# Patient Record
Sex: FEMALE | Race: BLACK OR AFRICAN AMERICAN | NOT HISPANIC OR LATINO | Employment: FULL TIME | URBAN - METROPOLITAN AREA
[De-identification: names, ages, dates, MRNs, and addresses within clinical notes are randomized per-mention and may not be internally consistent; named-entity substitution may affect disease eponyms.]

---

## 2023-06-13 LAB
CHLAMYDIA TRACH., AMPLIFIED: NEGATIVE
N. GONORRHEA, AMPLIFIED: NEGATIVE
TRICHOMONAS VAGINALIS: NEGATIVE

## 2023-06-14 LAB — URINE CULTURE: NORMAL

## 2023-06-21 LAB
ABO GROUP (TYPE) IN BLOOD: NORMAL
ANTIBODY SCREEN: NORMAL
GLUCOSE, 1 HR SCREEN, PREG: 139 MG/DL
RH FACTOR: NORMAL

## 2023-06-22 LAB
CALCIDIOL (25 OH VITAMIN D3) (NG/ML) IN SER/PLAS: 11 NG/ML
ERYTHROCYTE DISTRIBUTION WIDTH (RATIO) BY AUTOMATED COUNT: 13.1 % (ref 11.5–14.5)
ERYTHROCYTE MEAN CORPUSCULAR HEMOGLOBIN CONCENTRATION (G/DL) BY AUTOMATED: 33.1 G/DL (ref 32–36)
ERYTHROCYTE MEAN CORPUSCULAR VOLUME (FL) BY AUTOMATED COUNT: 94 FL (ref 80–100)
ERYTHROCYTES (10*6/UL) IN BLOOD BY AUTOMATED COUNT: 3.72 X10E12/L (ref 4–5.2)
HEMATOCRIT (%) IN BLOOD BY AUTOMATED COUNT: 35 % (ref 36–46)
HEMOGLOBIN (G/DL) IN BLOOD: 11.6 G/DL (ref 12–16)
HEPATITIS B VIRUS SURFACE AG PRESENCE IN SERUM: NONREACTIVE
HEPATITIS C VIRUS AB PRESENCE IN SERUM: NONREACTIVE
HIV 1/ 2 AG/AB SCREEN: NONREACTIVE
LEUKOCYTES (10*3/UL) IN BLOOD BY AUTOMATED COUNT: 4.5 X10E9/L (ref 4.4–11.3)
NRBC (PER 100 WBCS) BY AUTOMATED COUNT: 0 /100 WBC (ref 0–0)
PLATELETS (10*3/UL) IN BLOOD AUTOMATED COUNT: 318 X10E9/L (ref 150–450)
REFLEX ADDED, ANEMIA PANEL: ABNORMAL
RUBELLA VIRUS IGG AB: NEGATIVE
SYPHILIS TOTAL AB: NONREACTIVE

## 2023-07-22 LAB
GLUCOSE THREE HOUR: 110 MG/DL
GLUCOSE TWO HOUR: 108 MG/DL
GLUCOSE, FASTING: 93 MG/DL
GLUCOSE, ONE HOUR: 144 MG/DL
GTTCM: NORMAL

## 2023-08-30 PROBLEM — J06.9 UPPER RESPIRATORY INFECTION: Status: ACTIVE | Noted: 2023-08-30

## 2023-08-30 PROBLEM — O24.419 GESTATIONAL DIABETES (HHS-HCC): Status: ACTIVE | Noted: 2023-08-30

## 2023-08-30 PROBLEM — R45.4 IRRITABILITY AND ANGER: Status: ACTIVE | Noted: 2023-08-30

## 2023-08-30 PROBLEM — F51.5 NIGHTMARES: Status: ACTIVE | Noted: 2023-08-30

## 2023-08-30 PROBLEM — M54.30 SCIATIC PAIN: Status: ACTIVE | Noted: 2023-08-30

## 2023-08-30 PROBLEM — K52.9 CHRONIC DIARRHEA: Status: ACTIVE | Noted: 2023-08-30

## 2023-08-30 PROBLEM — F41.1 ANXIETY, GENERALIZED: Status: ACTIVE | Noted: 2023-08-30

## 2023-08-30 PROBLEM — O09.891 HISTORY OF PRETERM DELIVERY, CURRENTLY PREGNANT IN FIRST TRIMESTER (HHS-HCC): Status: ACTIVE | Noted: 2023-08-30

## 2023-08-30 PROBLEM — F42.8 OBSESSIVE THINKING: Status: ACTIVE | Noted: 2023-08-30

## 2023-08-30 PROBLEM — F41.9 ANXIETY IN PREGNANCY, ANTEPARTUM (HHS-HCC): Status: ACTIVE | Noted: 2023-08-30

## 2023-08-30 PROBLEM — O99.810 ABNORMAL GLUCOSE TOLERANCE IN PREGNANCY (HHS-HCC): Status: ACTIVE | Noted: 2023-08-30

## 2023-08-30 PROBLEM — K64.8 INTERNAL HEMORRHOIDS WITH COMPLICATION: Status: ACTIVE | Noted: 2023-08-30

## 2023-08-30 PROBLEM — S33.5XXA SPRAIN, LOW BACK: Status: ACTIVE | Noted: 2023-08-30

## 2023-08-30 PROBLEM — F43.10 PTSD (POST-TRAUMATIC STRESS DISORDER): Status: ACTIVE | Noted: 2023-08-30

## 2023-08-30 PROBLEM — M54.9 BACK PAIN: Status: ACTIVE | Noted: 2023-08-30

## 2023-08-30 PROBLEM — N63.0 BREAST LUMP OR MASS: Status: ACTIVE | Noted: 2023-08-30

## 2023-08-30 PROBLEM — O14.90 PRE-ECLAMPSIA (HHS-HCC): Status: ACTIVE | Noted: 2023-08-30

## 2023-08-30 PROBLEM — R10.2 PELVIC PAIN IN FEMALE: Status: ACTIVE | Noted: 2023-08-30

## 2023-08-30 PROBLEM — L60.3 NAIL BRITTLENESS: Status: ACTIVE | Noted: 2023-08-30

## 2023-08-30 PROBLEM — O99.340 ANXIETY IN PREGNANCY, ANTEPARTUM (HHS-HCC): Status: ACTIVE | Noted: 2023-08-30

## 2023-08-30 PROBLEM — N76.0 VAGINITIS: Status: ACTIVE | Noted: 2023-08-30

## 2023-08-30 PROBLEM — N88.2 CERVICAL STENOSIS (UTERINE CERVIX): Status: ACTIVE | Noted: 2023-08-30

## 2023-08-30 PROBLEM — F41.0 PANIC ATTACKS: Status: ACTIVE | Noted: 2023-08-30

## 2023-08-30 PROBLEM — O99.019 ANEMIA, ANTEPARTUM (HHS-HCC): Status: ACTIVE | Noted: 2023-08-30

## 2023-08-30 PROBLEM — K62.5 RECTAL BLEEDING: Status: ACTIVE | Noted: 2023-08-30

## 2023-08-30 PROBLEM — F32.A DEPRESSION: Status: ACTIVE | Noted: 2023-08-30

## 2023-08-30 PROBLEM — R19.7 FREQUENT LOOSE STOOLS: Status: ACTIVE | Noted: 2023-08-30

## 2023-08-30 PROBLEM — F32.5 MAJOR DEPRESSIVE DISORDER IN REMISSION (CMS-HCC): Status: ACTIVE | Noted: 2023-08-30

## 2023-08-30 PROBLEM — E55.9 VITAMIN D DEFICIENCY: Status: ACTIVE | Noted: 2023-08-30

## 2023-08-30 PROBLEM — R10.9 ABDOMINAL PAIN: Status: ACTIVE | Noted: 2023-08-30

## 2023-08-30 PROBLEM — T83.32XA IUD MIGRATION: Status: ACTIVE | Noted: 2023-08-30

## 2023-08-30 PROBLEM — G47.9 SLEEP DIFFICULTIES: Status: ACTIVE | Noted: 2023-08-30

## 2023-08-30 PROBLEM — O09.299 H/O GESTATIONAL DIABETES IN PRIOR PREGNANCY, CURRENTLY PREGNANT (HHS-HCC): Status: ACTIVE | Noted: 2023-08-30

## 2023-08-30 PROBLEM — I10 HYPERTENSION: Status: ACTIVE | Noted: 2023-08-30

## 2023-08-30 PROBLEM — K58.9 IRRITABLE BOWEL SYNDROME: Status: ACTIVE | Noted: 2023-08-30

## 2023-08-30 PROBLEM — Z86.32 H/O GESTATIONAL DIABETES IN PRIOR PREGNANCY, CURRENTLY PREGNANT (HHS-HCC): Status: ACTIVE | Noted: 2023-08-30

## 2023-08-30 PROBLEM — K58.2 MIXED IRRITABLE BOWEL SYNDROME: Status: ACTIVE | Noted: 2023-08-30

## 2023-08-30 RX ORDER — ASPIRIN 81 MG/1
81 TABLET ORAL DAILY
COMMUNITY
End: 2023-10-06

## 2023-08-30 RX ORDER — OXYCODONE AND ACETAMINOPHEN 5; 325 MG/1; MG/1
TABLET ORAL EVERY 6 HOURS PRN
COMMUNITY
Start: 2018-11-11 | End: 2023-10-06

## 2023-08-30 RX ORDER — PROPRANOLOL HYDROCHLORIDE 10 MG/1
TABLET ORAL
COMMUNITY
End: 2023-10-06

## 2023-08-30 RX ORDER — IBUPROFEN 600 MG/1
600 TABLET ORAL EVERY 6 HOURS PRN
COMMUNITY
Start: 2018-11-11 | End: 2023-10-06

## 2023-08-30 RX ORDER — DICYCLOMINE HYDROCHLORIDE 10 MG/1
10 CAPSULE ORAL EVERY 6 HOURS PRN
COMMUNITY
Start: 2019-08-19 | End: 2023-10-06

## 2023-08-30 RX ORDER — FAMOTIDINE 40 MG/1
40 TABLET, FILM COATED ORAL DAILY
COMMUNITY
Start: 2021-02-09 | End: 2023-10-06

## 2023-08-30 RX ORDER — FERROUS SULFATE 325(65) MG
65 TABLET ORAL DAILY
Status: ON HOLD | COMMUNITY
End: 2023-10-11 | Stop reason: ALTCHOICE

## 2023-08-30 RX ORDER — TERCONAZOLE 8 MG/G
1 CREAM VAGINAL NIGHTLY
COMMUNITY
Start: 2022-07-06 | End: 2023-10-06

## 2023-08-30 RX ORDER — ACETAMINOPHEN 325 MG/1
TABLET ORAL
COMMUNITY
Start: 2018-10-29 | End: 2023-12-29 | Stop reason: HOSPADM

## 2023-08-30 RX ORDER — AMITRIPTYLINE HYDROCHLORIDE 50 MG/1
1 TABLET, FILM COATED ORAL NIGHTLY
COMMUNITY
Start: 2020-01-20 | End: 2023-10-06

## 2023-08-30 RX ORDER — CYCLOBENZAPRINE HCL 10 MG
10 TABLET ORAL 3 TIMES DAILY PRN
COMMUNITY
End: 2023-10-06

## 2023-08-30 RX ORDER — NITROFURANTOIN 25; 75 MG/1; MG/1
100 CAPSULE ORAL EVERY 12 HOURS
COMMUNITY
Start: 2022-05-02 | End: 2023-10-06

## 2023-08-30 RX ORDER — ONDANSETRON 4 MG/1
TABLET, FILM COATED ORAL
COMMUNITY
Start: 2020-12-28 | End: 2023-10-06

## 2023-08-30 RX ORDER — BUSPIRONE HYDROCHLORIDE 7.5 MG/1
7.5 TABLET ORAL 2 TIMES DAILY
COMMUNITY
Start: 2022-03-16 | End: 2023-10-06

## 2023-08-30 RX ORDER — PROGESTERONE 200 MG/1
200 CAPSULE ORAL NIGHTLY
COMMUNITY
End: 2023-10-06

## 2023-08-30 RX ORDER — ONDANSETRON 4 MG/1
4 TABLET, ORALLY DISINTEGRATING ORAL EVERY 8 HOURS PRN
COMMUNITY
Start: 2022-11-11 | End: 2023-10-06

## 2023-08-30 RX ORDER — HYDROXYZINE HYDROCHLORIDE 25 MG/1
TABLET, FILM COATED ORAL
COMMUNITY
Start: 2020-03-02 | End: 2023-10-06

## 2023-08-30 RX ORDER — DICYCLOMINE HYDROCHLORIDE 10 MG/1
CAPSULE ORAL
COMMUNITY
Start: 2021-03-03 | End: 2023-10-06

## 2023-10-01 VITALS — BODY MASS INDEX: 24.37 KG/M2 | SYSTOLIC BLOOD PRESSURE: 118 MMHG | DIASTOLIC BLOOD PRESSURE: 71 MMHG | WEIGHT: 129 LBS

## 2023-10-06 ENCOUNTER — ROUTINE PRENATAL (OUTPATIENT)
Dept: OBSTETRICS AND GYNECOLOGY | Facility: CLINIC | Age: 33
End: 2023-10-06
Payer: COMMERCIAL

## 2023-10-06 VITALS — BODY MASS INDEX: 30.61 KG/M2 | SYSTOLIC BLOOD PRESSURE: 130 MMHG | DIASTOLIC BLOOD PRESSURE: 77 MMHG | WEIGHT: 162 LBS

## 2023-10-06 DIAGNOSIS — Z3A.26 26 WEEKS GESTATION OF PREGNANCY (HHS-HCC): Primary | ICD-10-CM

## 2023-10-06 PROCEDURE — 99213 OFFICE O/P EST LOW 20 MIN: CPT | Performed by: OBSTETRICS & GYNECOLOGY

## 2023-10-06 NOTE — PROGRESS NOTES
"Subjective   Patient ID [id number]   Alba Mendez is a 33 y.o.  at 26 wk 0d with a working estimated date of delivery of 2024, by Ultrasound who presents for a routine prenatal visit. She denies vaginal bleeding, leakage of fluid, decreased fetal movements, or contractions.    Her pregnancy is complicated by:  History of  delivery in .  History of 16-week .  History of primary low-transverse  section in 2018 for a girl weighing 7 pounds 10 ounces.  She desires TOLAC, and a tubal ligation.  Recent through Glucola was normal 93/144/108/110.  Vitamin D equals 11, on prescription vitamin D.  CBC 11.6, started oral iron    Objective   Physical Exam FH=27 cm. QKY=109  Weight: 73.5 kg (162 lb)  Expected Total Weight Gain: Could not be calculated   Pregravid BMI: Could not be calculated  BP: 130/77         Prenatal Labs  Urine dip:  Lab Results   Component Value Date    KETONESU 20 (1+) (A) 2023       Lab Results   Component Value Date    HGB 11.6 (L) 2023    HCT 35.0 (L) 2023    HEPBSAG NONREACTIVE 2023     No results found for: \"PAPPA\", \"AFP\", \"HCG\", \"ESTRIOL\", \"INHBA\"  No results found for: \"GLUF\", \"GLUT1\", \"ZLFGJBD3PU\", \"DBSQFLI3HG\"    Imaging  The most recent ultrasound was performed on   with a study GA of   and EFW of  .          Assessment/Plan       Continue prenatal vitamin.  Labs reviewed.  Rhogam n/a  GTT 93/144/108/110.  Take Vit D and iron.  Follow up in 4 weeks for a routine prenatal visit.  "

## 2023-10-11 ENCOUNTER — HOSPITAL ENCOUNTER (OUTPATIENT)
Facility: HOSPITAL | Age: 33
End: 2023-10-11
Attending: OBSTETRICS & GYNECOLOGY | Admitting: OBSTETRICS & GYNECOLOGY
Payer: COMMERCIAL

## 2023-10-11 ENCOUNTER — HOSPITAL ENCOUNTER (OUTPATIENT)
Facility: HOSPITAL | Age: 33
Discharge: HOME | End: 2023-10-11
Attending: OBSTETRICS & GYNECOLOGY | Admitting: OBSTETRICS & GYNECOLOGY
Payer: COMMERCIAL

## 2023-10-11 VITALS
RESPIRATION RATE: 18 BRPM | TEMPERATURE: 98.2 F | HEART RATE: 90 BPM | SYSTOLIC BLOOD PRESSURE: 116 MMHG | DIASTOLIC BLOOD PRESSURE: 74 MMHG | OXYGEN SATURATION: 98 %

## 2023-10-11 DIAGNOSIS — K21.9 GASTROESOPHAGEAL REFLUX DISEASE WITHOUT ESOPHAGITIS: Primary | ICD-10-CM

## 2023-10-11 LAB
ALBUMIN SERPL BCP-MCNC: 3.7 G/DL (ref 3.4–5)
ALP SERPL-CCNC: 56 U/L (ref 33–110)
ALT SERPL W P-5'-P-CCNC: 12 U/L (ref 7–45)
ANION GAP SERPL CALC-SCNC: 17 MMOL/L (ref 10–20)
AST SERPL W P-5'-P-CCNC: 14 U/L (ref 9–39)
BILIRUB SERPL-MCNC: 0.2 MG/DL (ref 0–1.2)
BNP SERPL-MCNC: 12 PG/ML (ref 0–99)
BUN SERPL-MCNC: 9 MG/DL (ref 6–23)
CALCIUM SERPL-MCNC: 9.2 MG/DL (ref 8.6–10.6)
CARDIAC TROPONIN I PNL SERPL HS: 7 NG/L (ref 0–34)
CHLORIDE SERPL-SCNC: 107 MMOL/L (ref 98–107)
CO2 SERPL-SCNC: 17 MMOL/L (ref 21–32)
CREAT SERPL-MCNC: 0.43 MG/DL (ref 0.5–1.05)
ERYTHROCYTE [DISTWIDTH] IN BLOOD BY AUTOMATED COUNT: 12.3 % (ref 11.5–14.5)
GFR SERPL CREATININE-BSD FRML MDRD: >90 ML/MIN/1.73M*2
GLUCOSE SERPL-MCNC: 129 MG/DL (ref 74–99)
HCT VFR BLD AUTO: 32.9 % (ref 36–46)
HGB BLD-MCNC: 10.8 G/DL (ref 12–16)
MCH RBC QN AUTO: 30.9 PG (ref 26–34)
MCHC RBC AUTO-ENTMCNC: 32.8 G/DL (ref 32–36)
MCV RBC AUTO: 94 FL (ref 80–100)
NRBC BLD-RTO: 0 /100 WBCS (ref 0–0)
PLATELET # BLD AUTO: 237 X10*3/UL (ref 150–450)
PMV BLD AUTO: 12 FL (ref 7.5–11.5)
POTASSIUM SERPL-SCNC: 4.6 MMOL/L (ref 3.5–5.3)
PROT SERPL-MCNC: 6.5 G/DL (ref 6.4–8.2)
RBC # BLD AUTO: 3.5 X10*6/UL (ref 4–5.2)
SODIUM SERPL-SCNC: 136 MMOL/L (ref 136–145)
WBC # BLD AUTO: 7.4 X10*3/UL (ref 4.4–11.3)

## 2023-10-11 PROCEDURE — 84484 ASSAY OF TROPONIN QUANT: CPT

## 2023-10-11 PROCEDURE — 2500000005 HC RX 250 GENERAL PHARMACY W/O HCPCS

## 2023-10-11 PROCEDURE — 83880 ASSAY OF NATRIURETIC PEPTIDE: CPT

## 2023-10-11 PROCEDURE — 99214 OFFICE O/P EST MOD 30 MIN: CPT | Performed by: STUDENT IN AN ORGANIZED HEALTH CARE EDUCATION/TRAINING PROGRAM

## 2023-10-11 PROCEDURE — 99214 OFFICE O/P EST MOD 30 MIN: CPT | Mod: 25

## 2023-10-11 PROCEDURE — 84075 ASSAY ALKALINE PHOSPHATASE: CPT

## 2023-10-11 PROCEDURE — 36415 COLL VENOUS BLD VENIPUNCTURE: CPT | Mod: 59

## 2023-10-11 PROCEDURE — 71045 X-RAY EXAM CHEST 1 VIEW: CPT | Mod: FOREIGN READ | Performed by: RADIOLOGY

## 2023-10-11 PROCEDURE — 36415 COLL VENOUS BLD VENIPUNCTURE: CPT

## 2023-10-11 PROCEDURE — 85027 COMPLETE CBC AUTOMATED: CPT

## 2023-10-11 RX ORDER — LABETALOL HYDROCHLORIDE 5 MG/ML
20 INJECTION, SOLUTION INTRAVENOUS ONCE AS NEEDED
Status: DISCONTINUED | OUTPATIENT
Start: 2023-10-11 | End: 2023-10-11 | Stop reason: HOSPADM

## 2023-10-11 RX ORDER — FAMOTIDINE 20 MG/1
40 TABLET, FILM COATED ORAL 2 TIMES DAILY PRN
Qty: 30 TABLET | Refills: 2 | Status: SHIPPED | OUTPATIENT
Start: 2023-10-11 | End: 2023-11-22 | Stop reason: WASHOUT

## 2023-10-11 RX ORDER — NIFEDIPINE 10 MG/1
10 CAPSULE ORAL ONCE AS NEEDED
Status: DISCONTINUED | OUTPATIENT
Start: 2023-10-11 | End: 2023-10-11 | Stop reason: HOSPADM

## 2023-10-11 RX ORDER — ONDANSETRON HYDROCHLORIDE 2 MG/ML
4 INJECTION, SOLUTION INTRAVENOUS EVERY 6 HOURS PRN
Status: DISCONTINUED | OUTPATIENT
Start: 2023-10-11 | End: 2023-10-11 | Stop reason: HOSPADM

## 2023-10-11 RX ORDER — ONDANSETRON 4 MG/1
4 TABLET, FILM COATED ORAL EVERY 6 HOURS PRN
Status: DISCONTINUED | OUTPATIENT
Start: 2023-10-11 | End: 2023-10-11 | Stop reason: HOSPADM

## 2023-10-11 RX ORDER — ERGOCALCIFEROL 1.25 MG/1
1.25 CAPSULE ORAL
COMMUNITY

## 2023-10-11 RX ORDER — HYDRALAZINE HYDROCHLORIDE 20 MG/ML
5 INJECTION INTRAMUSCULAR; INTRAVENOUS ONCE AS NEEDED
Status: DISCONTINUED | OUTPATIENT
Start: 2023-10-11 | End: 2023-10-11 | Stop reason: HOSPADM

## 2023-10-11 RX ORDER — LIDOCAINE HYDROCHLORIDE 10 MG/ML
0.5 INJECTION INFILTRATION; PERINEURAL ONCE AS NEEDED
Status: DISCONTINUED | OUTPATIENT
Start: 2023-10-11 | End: 2023-10-11 | Stop reason: HOSPADM

## 2023-10-11 RX ADMIN — DIPHENHYDRAMINE HYDROCHLORIDE AND LIDOCAINE HYDROCHLORIDE AND ALUMINUM HYDROXIDE AND MAGNESIUM HYDRO 10 ML: KIT at 04:32

## 2023-10-11 ASSESSMENT — PAIN SCALES - GENERAL: PAINLEVEL: 5 - MODERATE PAIN

## 2023-10-11 NOTE — H&P
Obstetrical Admission History and Physical - TRIAGE     Subjective    Ms. Mendez is a 34yo  at 25.3wga presenting to OB triage for chest pain and SOB. Patient states that she was laying in bed about 4h ago and began experiencing sudden-onset shortness of breath with palpitations. She attempted changing positions while laying down with no improvement. She states palpitations have since resolved but continues to endorse centrally-located chest discomfort and SOB. She denies GERD or unusually strenuous activity. She denies a cardiac hx. Does report a remote hx of asthma, has not needed albuterol for years now, and states sx do not feel asthma-related. She denies dizziness, HA, syncope, or blurry vision. She does not report obstetric complaints including VB, LOF, or Ctx. She reports good FM.     Pregnancy notables:   - hx of PTD @ 16wks  and 25wks in . Was previously on vaginal progesterone and receiving CL measurements this pregnancy until 22wks.   - hx of preE in prior pregnancy   - hx of pLTCS for failed vacuum and arrest of descent          Obstetrical History   OB History    Para Term  AB Living   4 2 1 1 1 2   SAB IAB Ectopic Multiple Live Births   1       2      # Outcome Date GA Lbr Danyel/2nd Weight Sex Delivery Anes PTL Lv   4 Current            3 Term 18 39w5d  3459 g F CS-Unspec EPI  SANDI      Complications: Failure to Progress in Second Stage, Failure to progress in labor   2 SAB  16w0d   F    FD   1   25w0d  567 g F Vag-Spont  Y SANDI      Obstetric Comments   Blood Pressure Kit Device - Pt to take Blood pressure at least once a day 2023   Unspecified Medication - blood pressire cuff 2023   Aspirin 81 MG Oral Tablet Chewable - CHEW 1 TABLET BY MOUTH EVERY DAY 2023   Ferrous Sulfate 325 (65 Fe) MG Oral Tablet - TAKE 1 TABLET BY MOUTH EVERY DAY AS DIRECTED 2023   Prenatal Vitamins 28-0.8 MG Oral Tablet - TAKE 1 TABLET BY MOUTH EVERY DAY  2023       Past Medical History  Past Medical History:   Diagnosis Date    Personal history of gestational diabetes     History of gestational diabetes mellitus (GDM)    Personal history of other complications of pregnancy, childbirth and the puerperium     History of pre-eclampsia    Personal history of other diseases of the respiratory system     History of asthma        Past Surgical History   Past Surgical History:   Procedure Laterality Date    OTHER SURGICAL HISTORY  2019    Breast surgery    OTHER SURGICAL HISTORY  2019     section    OTHER SURGICAL HISTORY  2019    Oral surgery       Social History  Social History     Tobacco Use    Smoking status: Never    Smokeless tobacco: Never   Substance Use Topics    Alcohol use: Never     Substance and Sexual Activity   Drug Use Never       Allergies  Bee pollen, Ragweed, and Tree and shrub pollen     Medications  Medications Prior to Admission   Medication Sig Dispense Refill Last Dose    acetaminophen (Tylenol) 325 mg tablet 3 tab(s) orally every 6 hours, As Needed headache   Past Month    ergocalciferol (Vitamin D-2) 1.25 MG (40876 UT) capsule Take 1 capsule (1,250 mcg) by mouth 1 (one) time per week.   Past Week    PNV no.95/ferrous fum/folic ac (PRENATAL ORAL) 1 tablet once daily.   10/10/2023       Objective    Last Vitals  Temp Pulse Resp BP MAP O2 Sat   36.8 °C (98.2 °F) 90 18 116/74   98 %     Physical Examination  Genitourinary:      Vulva normal.   Cardiovascular:      Rate and Rhythm: Normal rate.   Pulmonary:      Effort: Pulmonary effort is normal. CTAB A/P     Breath sounds: Normal breath sounds.   Abdominal:      Palpations: Abdomen is soft.      Comments: Gravid, non-tender to palpation  Neurological:      General: No focal deficit present.      Mental Status: She is alert.   Skin:     General: Skin is warm and dry.   Psychiatric:         Mood and Affect: Mood normal.         Behavior: Behavior normal.      NST  Baseline: 140  Variability: moderate  Accels: +  Decels: -    SVE: deferred    Lab Review  Lab Results   Component Value Date    WBC 7.4 10/11/2023    HGB 10.8 (L) 10/11/2023    HCT 32.9 (L) 10/11/2023     10/11/2023     Lab Results   Component Value Date    BNP 12 10/11/2023     Trop and EKG neg   Assessment and Plan    Chest Pain, SOB  - EKG- NSR, possible left atrial enlargement   - CBC, CMP, Troponin, CNP, CXR wnl   - BMX to r/o GERD     2. IUP at 25.3wga  - CEFM Cat I currently   - Denies VB, LOF, or Ctx. Good FM  - continue routine pnc     Jyoti Hughes MD

## 2023-10-20 ENCOUNTER — TELEPHONE (OUTPATIENT)
Dept: OBSTETRICS AND GYNECOLOGY | Facility: CLINIC | Age: 33
End: 2023-10-20
Payer: COMMERCIAL

## 2023-10-20 NOTE — TELEPHONE ENCOUNTER
Pt called and is currently having a lot of mucus discharge she is 28 weeks pregnant. The discharge is white, no cramping. She has been having the discharge since this morning. Please advise thank you

## 2023-10-20 NOTE — TELEPHONE ENCOUNTER
"I talked with the patient.  She has no bleeding, no pain.  No dysuria, no change in bowel habits.  There is no itchy discharge or odor.  Reassurance was given.  In third-trimester pregnancy could have a mucousy white \"leukorrhea of pregnancy\".  She will call me if her symptoms change.  "

## 2023-10-25 ENCOUNTER — TELEPHONE (OUTPATIENT)
Dept: OBSTETRICS AND GYNECOLOGY | Facility: CLINIC | Age: 33
End: 2023-10-25
Payer: COMMERCIAL

## 2023-10-25 DIAGNOSIS — O99.810 ABNORMAL GLUCOSE TOLERANCE IN PREGNANCY (HHS-HCC): Primary | ICD-10-CM

## 2023-10-25 NOTE — TELEPHONE ENCOUNTER
She had a normal 3-hour glucose test in July 2023.  I did place another order,  plan to have it repeated at about 30 weeks.

## 2023-10-25 NOTE — TELEPHONE ENCOUNTER
Pt needs another order for 3 hour glucose testing before week 30 has appt next week with you she is in week 29 now. Please advise thank you

## 2023-11-03 ENCOUNTER — ROUTINE PRENATAL (OUTPATIENT)
Dept: OBSTETRICS AND GYNECOLOGY | Facility: CLINIC | Age: 33
End: 2023-11-03
Payer: COMMERCIAL

## 2023-11-03 VITALS — DIASTOLIC BLOOD PRESSURE: 72 MMHG | SYSTOLIC BLOOD PRESSURE: 128 MMHG | BODY MASS INDEX: 31.74 KG/M2 | WEIGHT: 168 LBS

## 2023-11-03 DIAGNOSIS — Z3A.30 30 WEEKS GESTATION OF PREGNANCY (HHS-HCC): Primary | ICD-10-CM

## 2023-11-03 DIAGNOSIS — R73.09 GLUCOSE TOLERANCE TEST ABNORMAL: ICD-10-CM

## 2023-11-03 DIAGNOSIS — Z34.83 PRENATAL CARE, SUBSEQUENT PREGNANCY, THIRD TRIMESTER (HHS-HCC): ICD-10-CM

## 2023-11-03 PROCEDURE — 0501F PRENATAL FLOW SHEET: CPT | Performed by: OBSTETRICS & GYNECOLOGY

## 2023-11-03 RX ORDER — PNV NO.95/FERROUS FUM/FOLIC AC 28MG-0.8MG
TABLET ORAL
COMMUNITY
Start: 2023-10-15

## 2023-11-03 NOTE — PROGRESS NOTES
"Subjective   Patient ID [id number]   Alba Mendez is a 33 y.o.  at 30w0d with a working estimated date of delivery of 2024, by Ultrasound who presents for a routine prenatal visit. She denies vaginal bleeding, leakage of fluid, decreased fetal movements, or contractions.    Her pregnancy is complicated by:  Previous , wants JESSIE AC with BTL if possible.  Is considering repeat .  State form signed 2023.,  Rubella nonimmune will need to vaccinate postpartum    Objective   Physical Exam: FH=31, EEB=038. VE closed/posterior/-4  Weight: 76.2 kg (168 lb)  Expected Total Weight Gain: Could not be calculated   Pregravid BMI: Could not be calculated  BP: 128/72                  Prenatal Labs  Urine Dip:  Lab Results   Component Value Date    KETONESU 20 (1+) (A) 2023     Lab Results   Component Value Date    HGB 10.8 (L) 10/11/2023    HCT 32.9 (L) 10/11/2023    ABO A 2023    HEPBSAG NONREACTIVE 2023     No results found for: \"PAPPA\", \"AFP\", \"HCG\", \"ESTRIOL\", \"INHBA\"  No results found for: \"GLUF\", \"GLUT1\", \"NQSEJBJ9HZ\", \"XUNDCRW3OR\"    Imaging  The most recent ultrasound was performed on 23  with a study GA of 22.6  and EFW of 64% .          Assessment/Plan     Continue prenatal vitamin.  Labs reviewed.  Needs 3-hour Glucola which she has planned for tomorrow.  GBS later.  Expected mode of delivery TOLAC vs repeat c/s with BS.  Follow up in 2 week for a routine prenatal visit.  "

## 2023-11-04 ENCOUNTER — LAB (OUTPATIENT)
Dept: LAB | Facility: LAB | Age: 33
End: 2023-11-04
Payer: COMMERCIAL

## 2023-11-04 DIAGNOSIS — O99.810 ABNORMAL GLUCOSE TOLERANCE IN PREGNANCY (HHS-HCC): ICD-10-CM

## 2023-11-04 DIAGNOSIS — O24.419 GESTATIONAL DIABETES MELLITUS (GDM) IN THIRD TRIMESTER, GESTATIONAL DIABETES METHOD OF CONTROL UNSPECIFIED (HHS-HCC): Primary | ICD-10-CM

## 2023-11-04 LAB
GLUCOSE 1H P 100 G GLC PO SERPL-MCNC: 205 MG/DL
GLUCOSE 2H P 100 G GLC PO SERPL-MCNC: 191 MG/DL
GLUCOSE 3H P 100 G GLC PO SERPL-MCNC: 118 MG/DL
GLUCOSE P FAST SERPL-MCNC: 109 MG/DL

## 2023-11-04 PROCEDURE — 82952 GTT-ADDED SAMPLES: CPT

## 2023-11-04 PROCEDURE — 82950 GLUCOSE TEST: CPT

## 2023-11-04 PROCEDURE — 36415 COLL VENOUS BLD VENIPUNCTURE: CPT

## 2023-11-04 NOTE — RESULT ENCOUNTER NOTE
The 3-hour Glucola does confirm gestational diabetes.  You have 3 abnormal values out of 4.  I did leave a message at your pharmacy to fill the prescription for a glucometer with test strips and lancets.  The plan is that you would check your blood sugars fasting, i.e. first thing in the morning.  Then you will check at 1 hour after breakfast, lunch and dinner.  Keep a log of those 4 values a day, you will need to follow-up with the high risk OB team for gestational diabetes monitoring.  I did put a referral in for that as well.

## 2023-11-06 DIAGNOSIS — O24.419 GESTATIONAL DIABETES MELLITUS (GDM) IN THIRD TRIMESTER, GESTATIONAL DIABETES METHOD OF CONTROL UNSPECIFIED (HHS-HCC): Primary | ICD-10-CM

## 2023-11-09 ENCOUNTER — NUTRITION (OUTPATIENT)
Dept: OBSTETRICS AND GYNECOLOGY | Facility: CLINIC | Age: 33
End: 2023-11-09

## 2023-11-17 ENCOUNTER — ROUTINE PRENATAL (OUTPATIENT)
Dept: OBSTETRICS AND GYNECOLOGY | Facility: CLINIC | Age: 33
End: 2023-11-17
Payer: COMMERCIAL

## 2023-11-17 ENCOUNTER — APPOINTMENT (OUTPATIENT)
Dept: OBSTETRICS AND GYNECOLOGY | Facility: CLINIC | Age: 33
End: 2023-11-17
Payer: COMMERCIAL

## 2023-11-17 VITALS — DIASTOLIC BLOOD PRESSURE: 73 MMHG | BODY MASS INDEX: 32.12 KG/M2 | WEIGHT: 170 LBS | SYSTOLIC BLOOD PRESSURE: 117 MMHG

## 2023-11-17 DIAGNOSIS — O24.410 DIET CONTROLLED GESTATIONAL DIABETES MELLITUS (GDM) IN THIRD TRIMESTER (HHS-HCC): ICD-10-CM

## 2023-11-17 DIAGNOSIS — Z3A.32 32 WEEKS GESTATION OF PREGNANCY (HHS-HCC): Primary | ICD-10-CM

## 2023-11-17 DIAGNOSIS — Z34.93 THIRD TRIMESTER PREGNANCY (HHS-HCC): ICD-10-CM

## 2023-11-17 PROCEDURE — 0501F PRENATAL FLOW SHEET: CPT | Performed by: OBSTETRICS & GYNECOLOGY

## 2023-11-17 RX ORDER — AZITHROMYCIN 250 MG/1
250 TABLET, FILM COATED ORAL DAILY
COMMUNITY
Start: 2023-11-13 | End: 2023-11-22 | Stop reason: WASHOUT

## 2023-11-17 NOTE — PROGRESS NOTES
"Subjective   Patient ID [id number]   Alba Mendez is a 33 y.o.  at 32w0d with a working estimated date of delivery of 2024, by Ultrasound who presents for a routine prenatal visit. She denies vaginal bleeding, leakage of fluid, decreased fetal movements, or contractions.    Her pregnancy is complicated by:  New diagnosis of gestational diabetes, had abnormal 3-hour last week.  She is contacted by [facility] and has follow-up .  She has an ultrasound also scheduled.  She has been logging her blood sugars and all fastings are elevated 10 2-1 23    Objective   Physical Exam: FH=34cm, QHA=266  Weight: 77.1 kg (170 lb)  Expected Total Weight Gain: Could not be calculated   Pregravid BMI: Could not be calculated  BP: 117/73                  Prenatal Labs  Urine Dip:  Lab Results   Component Value Date    KETONESU 20 (1+) (A) 2023     Lab Results   Component Value Date    HGB 10.8 (L) 10/11/2023    HCT 32.9 (L) 10/11/2023    ABO A 2023    HEPBSAG NONREACTIVE 2023     No results found for: \"PAPPA\", \"AFP\", \"HCG\", \"ESTRIOL\", \"INHBA\"  No results found for: \"GLUF\", \"GLUT1\", \"CRCNZPT8IM\", \"YVYZLGI9PX\"    Imaging  The most recent ultrasound was performed on The most recent ultrasound study is not finalized with a study GA of The most recent ultrasound study is not finalized and EFW of The most recent ultrasound study is not finalized.  The most recent ultrasound study is not finalized  The most recent ultrasound study is not finalized    Assessment/Plan     Continue prenatal vitamin.  Labs reviewed. GDM.  Has follow-up visit with [facility] and ultrasound next week.  GBS later.  Expected mode of delivery TOLAC, wants BTL.  Follow up in 2 week for a routine prenatal visit.  "

## 2023-11-22 ENCOUNTER — PHARMACY VISIT (OUTPATIENT)
Dept: PHARMACY | Facility: CLINIC | Age: 33
End: 2023-11-22
Payer: COMMERCIAL

## 2023-11-22 ENCOUNTER — ANCILLARY PROCEDURE (OUTPATIENT)
Dept: RADIOLOGY | Facility: CLINIC | Age: 33
End: 2023-11-22
Payer: COMMERCIAL

## 2023-11-22 ENCOUNTER — INITIAL PRENATAL (OUTPATIENT)
Dept: MATERNAL FETAL MEDICINE | Facility: CLINIC | Age: 33
End: 2023-11-22
Payer: COMMERCIAL

## 2023-11-22 VITALS — WEIGHT: 167 LBS | DIASTOLIC BLOOD PRESSURE: 73 MMHG | BODY MASS INDEX: 31.55 KG/M2 | SYSTOLIC BLOOD PRESSURE: 116 MMHG

## 2023-11-22 DIAGNOSIS — O24.419 GESTATIONAL DIABETES MELLITUS (GDM) IN THIRD TRIMESTER, GESTATIONAL DIABETES METHOD OF CONTROL UNSPECIFIED (HHS-HCC): ICD-10-CM

## 2023-11-22 PROCEDURE — 99215 OFFICE O/P EST HI 40 MIN: CPT | Mod: 25 | Performed by: STUDENT IN AN ORGANIZED HEALTH CARE EDUCATION/TRAINING PROGRAM

## 2023-11-22 PROCEDURE — 76819 FETAL BIOPHYS PROFIL W/O NST: CPT

## 2023-11-22 PROCEDURE — 99215 OFFICE O/P EST HI 40 MIN: CPT | Performed by: STUDENT IN AN ORGANIZED HEALTH CARE EDUCATION/TRAINING PROGRAM

## 2023-11-22 PROCEDURE — RXMED WILLOW AMBULATORY MEDICATION CHARGE

## 2023-11-22 PROCEDURE — 76816 OB US FOLLOW-UP PER FETUS: CPT | Performed by: STUDENT IN AN ORGANIZED HEALTH CARE EDUCATION/TRAINING PROGRAM

## 2023-11-22 PROCEDURE — 76819 FETAL BIOPHYS PROFIL W/O NST: CPT | Performed by: STUDENT IN AN ORGANIZED HEALTH CARE EDUCATION/TRAINING PROGRAM

## 2023-11-22 PROCEDURE — 76816 OB US FOLLOW-UP PER FETUS: CPT

## 2023-11-22 RX ORDER — PEN NEEDLE, DIABETIC 30 GX3/16"
NEEDLE, DISPOSABLE MISCELLANEOUS
Qty: 100 EACH | Refills: 3 | Status: SHIPPED | OUTPATIENT
Start: 2023-11-22 | End: 2023-12-29 | Stop reason: HOSPADM

## 2023-11-22 RX ORDER — ISOPROPYL ALCOHOL 70 ML/100ML
SWAB TOPICAL
Qty: 100 EACH | Refills: 3 | Status: SHIPPED | OUTPATIENT
Start: 2023-11-22 | End: 2023-12-29 | Stop reason: HOSPADM

## 2023-11-22 RX ORDER — INSULIN HUMAN 100 [IU]/ML
INJECTION, SUSPENSION SUBCUTANEOUS
Qty: 5 EACH | Refills: 11 | Status: SHIPPED | OUTPATIENT
Start: 2023-11-22 | End: 2023-11-22 | Stop reason: SDUPTHER

## 2023-11-22 RX ORDER — PEN NEEDLE, DIABETIC 30 GX3/16"
NEEDLE, DISPOSABLE MISCELLANEOUS
Qty: 100 EACH | Refills: 3 | Status: SHIPPED | OUTPATIENT
Start: 2023-11-22 | End: 2023-11-22 | Stop reason: SDUPTHER

## 2023-11-22 RX ORDER — HUMAN INSULIN 100 [IU]/ML
10 INJECTION, SUSPENSION SUBCUTANEOUS 2 TIMES DAILY
Qty: 15 ML | Refills: 11 | Status: SHIPPED | OUTPATIENT
Start: 2023-11-22 | End: 2023-12-29 | Stop reason: HOSPADM

## 2023-11-22 RX ORDER — ISOPROPYL ALCOHOL 70 ML/100ML
SWAB TOPICAL
Qty: 100 EACH | Refills: 3 | Status: SHIPPED | OUTPATIENT
Start: 2023-11-22 | End: 2023-11-22 | Stop reason: SDUPTHER

## 2023-11-22 NOTE — PROGRESS NOTES
Alba Mendez is a 32 yo  @ 32w5d who presents for a MFM consultation for newly diagnosed gestational diabetes. Pregnancy complicated by a history of  delivery x2 (25 weeks and 16 weeks), anxiety, prior  delivery, history of preeclampsia and asthma. Alba was seen by MFM at 12 weeks gestation. Please see prior note for additional details.    Alba had an elevated 1 hour glucola of 139 followed by an abnormal 3 hour. She has been checking her BG values 4x daily and reports the following values:    Fastin-129 (4/5 elevated)  Breakfast: 123-181 (3/5 elevated)  Lunch: 113-155 (1/5 elevated)   Dinner: 127-202 (3/5 elevated)    She is otherwise doing ok and denies leakage of fluid or vaginal bleeding. She reports good fetal movement.    Prior to today's visit she had an ultrasound that showed an estimated fetal weight of 2168 g (59%) with a normal amniotic fluid index of 14.9 cm    PMHX: asthma, anxiety  PSHX: PLTCD, breast cyst removal, wisdom teeth  OBXH:    1st: PLTCD for failed vacuum   2nd:  @ 25 weeks   3rd:  @ 16 weeks    4th: current  GYN: denies STIs  MEDS: PNV, vitamin D  ALL: NKDA  SOCIAL: denies tobacco/alcohol/illicit drug use  FAMILY: mother - DMII, HTN    O: /73   Wt 75.8 kg (167 lb)   LMP 2023   BMI 31.55 kg/m²   Gen: NAD  Resp: nonlabored breathing  Cardiac: good peripheral perfusion  Abd: gravid  Psych: appropriate mood and affect  FHTs: +FCA on U/S    A/P: Alba Mendez is a 32 yo  @ 32w5d who presents for a MFM consultation for newly diagnosed gestational diabetes.     Gestational Diabetes:    We discussed the pregnancy implications of the diagnosis including increased risk of pre-eclampsia, LGA/macrosomia, shoulder dystocia, stillbirth as well as  hypoglycemia, hyperbilirubinemia and respiratory distress. We reviewed the importance of glycemic control and its impact on lowering these risks. We discussed management ranging  from dietary changes to pharmacotherapy and that insulin is considered first line therapy rather than oral agents if medication is ultimately needed. We discussed our recommendation for serial growth ultrasounds and starting  testing at 32 weeks if treatment is required. We also stressed the potential persistence of impaired glucose tolerance post pregnancy in up to 30% of patients and 50% risk of IGT/T2DM in the next 10 years with an overall lifetime risk of T2DM of 70%. We reviewed the recommendation for postpartum screening at 6 weeks post-partum (can be performed as soon as 2 days after delivery) and, if normal, routine screening every 1-3 years. We did discuss that a healthy diet and maintenance of a normal body weight may reduce those risks    In summary the following is recommended:    1. We will continue to co-manage diabetes via the Bloodsugar line with weekly assessment of glycemic control.  Current regimen is: NPH 10 units in the morning and 10 units at night  2. We will plan for a follow up MD visit at 36 weeks to assess overall control and provide delivery timing recommendations.  3. Recommend serial growth ultrasounds every 4 weeks  4. Recommend staring weekly  testing now and twice weekly testing at 36 weeks   5. Delivery is recommended at 39 weeks, though if glycemic control is suboptimal then delivery at 37-39 weeks may be considered.  6. If the EFW is >4500g at the time of delivery  should be considered.  7. A 2hr GTT is recommended 6 weeks postpartum (can be performed as soon as 2 days postpartum), if normal then screening for T2DM is recommended at least every 3 years.    Thank you for the consultation. Please reach out to me with any questions or concerns.    Bennett Addison MD  Maternal-Fetal Medicine

## 2023-11-22 NOTE — PROGRESS NOTES
Insulin Pen Education     Able to demonstrate/describe:                   Wash hands                    Check label - verify correct medication                 Gentle mix (NPH)                 Attach new needle each time                 Safety test  (prime pen, ensure needle working properly)                  Select correct dose                 Self injection                 Site selection & rotation                 Remove needle                 Insulin Storage                 Needle disposal    Review Hypoglycemia                  Causes                 Sign & symptoms                 Oral Treatment    Patient appears to have good understanding and is engaged in self-care.  Encouraged to call for questions or concerns    Given printed education materials  Plans f/u by phone  2-3 days and then weekly for BGM support.    Given log sheets and contact information.

## 2023-12-01 ENCOUNTER — ROUTINE PRENATAL (OUTPATIENT)
Dept: OBSTETRICS AND GYNECOLOGY | Facility: CLINIC | Age: 33
End: 2023-12-01
Payer: COMMERCIAL

## 2023-12-01 ENCOUNTER — APPOINTMENT (OUTPATIENT)
Dept: OBSTETRICS AND GYNECOLOGY | Facility: CLINIC | Age: 33
End: 2023-12-01
Payer: COMMERCIAL

## 2023-12-01 VITALS — BODY MASS INDEX: 32.12 KG/M2 | SYSTOLIC BLOOD PRESSURE: 130 MMHG | WEIGHT: 170 LBS | DIASTOLIC BLOOD PRESSURE: 75 MMHG

## 2023-12-01 DIAGNOSIS — Z3A.34 34 WEEKS GESTATION OF PREGNANCY (HHS-HCC): Primary | ICD-10-CM

## 2023-12-01 DIAGNOSIS — O99.019 ANEMIA, ANTEPARTUM (HHS-HCC): ICD-10-CM

## 2023-12-01 DIAGNOSIS — O24.414 INSULIN CONTROLLED GESTATIONAL DIABETES MELLITUS (GDM) IN THIRD TRIMESTER (HHS-HCC): ICD-10-CM

## 2023-12-01 DIAGNOSIS — Z34.93 THIRD TRIMESTER PREGNANCY (HHS-HCC): ICD-10-CM

## 2023-12-01 PROCEDURE — 0501F PRENATAL FLOW SHEET: CPT | Performed by: OBSTETRICS & GYNECOLOGY

## 2023-12-01 NOTE — PROGRESS NOTES
"Subjective   Patient ID [id number]   Alba Mendez is a 33 y.o.  at 34w0d with a working estimated date of delivery of 2024, by Ultrasound who presents for a routine prenatal visit. She denies vaginal bleeding, leakage of fluid, decreased fetal movements, or contractions.    Her pregnancy is complicated by:  Recent ultrasound on 2023 notes breech presentation.  Now on 10 units of insulin at p.m., and a.m. for gestational diabetes.  Considering repeat .  Signed BTL form but not sure about tubal ligation anymore.    Objective   Physical Exam: FH=34.5 cm, WHW=398  Weight: 77.1 kg (170 lb)  Expected Total Weight Gain: Could not be calculated   Pregravid BMI: Could not be calculated  BP: 130/75                  Prenatal Labs  Urine Dip:  Lab Results   Component Value Date    KETONESU 20 (1+) (A) 2023     Lab Results   Component Value Date    HGB 10.8 (L) 10/11/2023    HCT 32.9 (L) 10/11/2023    ABO A 2023    HEPBSAG NONREACTIVE 2023     No results found for: \"PAPPA\", \"AFP\", \"HCG\", \"ESTRIOL\", \"INHBA\"  No results found for: \"GLUF\", \"GLUT1\", \"ZFVXDAZ3ZX\", \"NDIVIZH8TA\"    Imaging  The most recent ultrasound was performed on The most recent ultrasound study is not finalized with a study GA of The most recent ultrasound study is not finalized and EFW of The most recent ultrasound study is not finalized.  The most recent ultrasound study is not finalized  The most recent ultrasound study is not finalized    Assessment/Plan     Continue prenatal vitamin.  Labs reviewed.  GBS later.  Expected mode of delivery , considering repeat  #2 due to breech presentation, A2 diabetes on insulin.  Follow up in 1 week for a routine prenatal visit and NST.  "

## 2023-12-04 ENCOUNTER — PREP FOR PROCEDURE (OUTPATIENT)
Dept: OBSTETRICS AND GYNECOLOGY | Facility: CLINIC | Age: 33
End: 2023-12-04
Payer: COMMERCIAL

## 2023-12-04 DIAGNOSIS — O34.219 PREVIOUS CESAREAN SECTION COMPLICATING PREGNANCY (HHS-HCC): ICD-10-CM

## 2023-12-04 DIAGNOSIS — O24.414 INSULIN CONTROLLED GESTATIONAL DIABETES MELLITUS (GDM) IN THIRD TRIMESTER (HHS-HCC): Primary | ICD-10-CM

## 2023-12-05 ENCOUNTER — HOSPITAL ENCOUNTER (OUTPATIENT)
Facility: HOSPITAL | Age: 33
Setting detail: SURGERY ADMIT
End: 2023-12-05
Attending: OBSTETRICS & GYNECOLOGY | Admitting: OBSTETRICS & GYNECOLOGY
Payer: COMMERCIAL

## 2023-12-05 PROBLEM — O34.219 PREVIOUS CESAREAN SECTION COMPLICATING PREGNANCY (HHS-HCC): Status: ACTIVE | Noted: 2023-12-04

## 2023-12-06 ENCOUNTER — PATIENT MESSAGE (OUTPATIENT)
Dept: MATERNAL FETAL MEDICINE | Facility: CLINIC | Age: 33
End: 2023-12-06
Payer: COMMERCIAL

## 2023-12-08 ENCOUNTER — TELEPHONE (OUTPATIENT)
Dept: MATERNAL FETAL MEDICINE | Facility: HOSPITAL | Age: 33
End: 2023-12-08

## 2023-12-08 ENCOUNTER — PROCEDURE VISIT (OUTPATIENT)
Dept: OBSTETRICS AND GYNECOLOGY | Facility: CLINIC | Age: 33
End: 2023-12-08
Payer: COMMERCIAL

## 2023-12-08 VITALS — WEIGHT: 174 LBS | DIASTOLIC BLOOD PRESSURE: 75 MMHG | SYSTOLIC BLOOD PRESSURE: 132 MMHG | BODY MASS INDEX: 32.88 KG/M2

## 2023-12-08 DIAGNOSIS — Z34.93 THIRD TRIMESTER PREGNANCY (HHS-HCC): ICD-10-CM

## 2023-12-08 DIAGNOSIS — O24.414 INSULIN CONTROLLED GESTATIONAL DIABETES MELLITUS (GDM) IN THIRD TRIMESTER (HHS-HCC): ICD-10-CM

## 2023-12-08 DIAGNOSIS — Z3A.35 35 WEEKS GESTATION OF PREGNANCY (HHS-HCC): Primary | ICD-10-CM

## 2023-12-08 PROCEDURE — 99214 OFFICE O/P EST MOD 30 MIN: CPT | Performed by: OBSTETRICS & GYNECOLOGY

## 2023-12-08 PROCEDURE — 87081 CULTURE SCREEN ONLY: CPT

## 2023-12-08 PROCEDURE — 59025 FETAL NON-STRESS TEST: CPT | Performed by: OBSTETRICS & GYNECOLOGY

## 2023-12-08 NOTE — PROGRESS NOTES
"Subjective   Patient ID [id number]   Alba Mendez is a 33 y.o.  at 35w0d with a working estimated date of delivery of 2024, by Ultrasound who presents for a routine prenatal visit. She denies vaginal bleeding, leakage of fluid, decreased fetal movements, or contractions.    Her pregnancy is complicated by:  Gestational diabetes, on insulin.  NST today was reactive.  The baseline is 135, with accelerations to 160s, 15 x 15 seconds.  No decelerations.  Category 1, reactive NST.    Objective   Physical Exam: VE /-3  Weight: 78.9 kg (174 lb)  Expected Total Weight Gain: Could not be calculated   Pregravid BMI: Could not be calculated  BP: 132/75                  Prenatal Labs  Urine Dip:  Lab Results   Component Value Date    KETONESU 20 (1+) (A) 2023     Lab Results   Component Value Date    HGB 10.8 (L) 10/11/2023    HCT 32.9 (L) 10/11/2023    ABO A 2023    HEPBSAG NONREACTIVE 2023     No results found for: \"PAPPA\", \"AFP\", \"HCG\", \"ESTRIOL\", \"INHBA\"  No results found for: \"GLUF\", \"GLUT1\", \"RFIKFHA0AF\", \"WHKKEAO1OL\"    Imaging  The most recent ultrasound was performed on The most recent ultrasound study is not finalized with a study GA of The most recent ultrasound study is not finalized and EFW of The most recent ultrasound study is not finalized.  The most recent ultrasound study is not finalized  The most recent ultrasound study is not finalized    Assessment/Plan     Continue prenatal vitamin.  Labs reviewed.  NST category 1 today.  GBS taken.  Expected mode of delivery plan repeat  #2 on 2024 at 10:30 AM.  Currently declines BTL  Follow up in 1 week for a routine prenatal visit.  "

## 2023-12-08 NOTE — TELEPHONE ENCOUNTER
Blood Sugar Support Line Communication   Communicated with the patient on 12/8/2023   She has Gestational Diabetes @ 35w0d     At the time of the call her diabetes was treated with:  NPH inulin before breakfast and before bed  10/10    The patient checks her sugars fasting and 1 hour after meals, and reports them as follows:  Fasting   102-111.  6/7   levels above goal   One low r/t delay eating    After breakfast  119-205..  1/4   levels above goal    After lunch  105-183.  2/5   levels above goal    After dinner  110-155.  2/4   levels above goal    Alba Mendez states hectic schedule and irregular food choice -> missed Fingerstick and elevated  BG.  Some days only FBS    The patient's regimen was changed to:   NPH inulin before breakfast and before bed  16*/16*  Reviewed Hypoglycemia signs, symptoms, common causes and treatment, Encouraged to avoid long periods without food.    Has MFM appt & U/S next week    Patient understands to submit sugar log for review weekly through the Blood Sugar Line @ [phone] or via email to [email or web address] to help optimize glucose control.    I spent approximately 4-6 minutes on the phone with the patient

## 2023-12-11 LAB — GP B STREP GENITAL QL CULT: NORMAL

## 2023-12-15 ENCOUNTER — ANCILLARY PROCEDURE (OUTPATIENT)
Dept: RADIOLOGY | Facility: CLINIC | Age: 33
End: 2023-12-15
Payer: COMMERCIAL

## 2023-12-15 ENCOUNTER — ROUTINE PRENATAL (OUTPATIENT)
Dept: OBSTETRICS AND GYNECOLOGY | Facility: CLINIC | Age: 33
End: 2023-12-15
Payer: COMMERCIAL

## 2023-12-15 ENCOUNTER — ROUTINE PRENATAL (OUTPATIENT)
Dept: MATERNAL FETAL MEDICINE | Facility: CLINIC | Age: 33
End: 2023-12-15
Payer: COMMERCIAL

## 2023-12-15 VITALS — DIASTOLIC BLOOD PRESSURE: 79 MMHG | BODY MASS INDEX: 33.25 KG/M2 | SYSTOLIC BLOOD PRESSURE: 136 MMHG | WEIGHT: 176 LBS

## 2023-12-15 VITALS — SYSTOLIC BLOOD PRESSURE: 125 MMHG | BODY MASS INDEX: 33.25 KG/M2 | WEIGHT: 176 LBS | DIASTOLIC BLOOD PRESSURE: 77 MMHG

## 2023-12-15 DIAGNOSIS — O36.63X0 EXCESSIVE FETAL GROWTH AFFECTING MANAGEMENT OF PREGNANCY IN THIRD TRIMESTER, SINGLE OR UNSPECIFIED FETUS (HHS-HCC): ICD-10-CM

## 2023-12-15 DIAGNOSIS — Z34.90 PREGNANT (HHS-HCC): ICD-10-CM

## 2023-12-15 DIAGNOSIS — Z34.93 THIRD TRIMESTER PREGNANCY (HHS-HCC): ICD-10-CM

## 2023-12-15 DIAGNOSIS — O24.419 GESTATIONAL DIABETES (HHS-HCC): ICD-10-CM

## 2023-12-15 DIAGNOSIS — O24.414 INSULIN CONTROLLED GESTATIONAL DIABETES MELLITUS (GDM) IN THIRD TRIMESTER (HHS-HCC): Primary | ICD-10-CM

## 2023-12-15 DIAGNOSIS — Z3A.36 36 WEEKS GESTATION OF PREGNANCY (HHS-HCC): Primary | ICD-10-CM

## 2023-12-15 DIAGNOSIS — Z3A.36 36 WEEKS GESTATION OF PREGNANCY (HHS-HCC): ICD-10-CM

## 2023-12-15 LAB
POC APPEARANCE, URINE: CLEAR
POC BILIRUBIN, URINE: NEGATIVE
POC BLOOD, URINE: ABNORMAL
POC COLOR, URINE: YELLOW
POC GLUCOSE, URINE: NEGATIVE MG/DL
POC KETONES, URINE: NEGATIVE MG/DL
POC LEUKOCYTES, URINE: NEGATIVE
POC NITRITE,URINE: NEGATIVE
POC PH, URINE: 5.5 PH
POC PROTEIN, URINE: NEGATIVE MG/DL
POC SPECIFIC GRAVITY, URINE: >=1.03
POC UROBILINOGEN, URINE: 0.2 EU/DL

## 2023-12-15 PROCEDURE — 76816 OB US FOLLOW-UP PER FETUS: CPT

## 2023-12-15 PROCEDURE — 99214 OFFICE O/P EST MOD 30 MIN: CPT | Performed by: NURSE PRACTITIONER

## 2023-12-15 PROCEDURE — 76819 FETAL BIOPHYS PROFIL W/O NST: CPT | Performed by: OBSTETRICS & GYNECOLOGY

## 2023-12-15 PROCEDURE — 81003 URINALYSIS AUTO W/O SCOPE: CPT | Performed by: NURSE PRACTITIONER

## 2023-12-15 PROCEDURE — 76816 OB US FOLLOW-UP PER FETUS: CPT | Performed by: OBSTETRICS & GYNECOLOGY

## 2023-12-15 PROCEDURE — 0501F PRENATAL FLOW SHEET: CPT | Performed by: OBSTETRICS & GYNECOLOGY

## 2023-12-15 PROCEDURE — 76819 FETAL BIOPHYS PROFIL W/O NST: CPT

## 2023-12-15 RX ORDER — LANCETS 33 GAUGE
1 EACH MISCELLANEOUS DAILY
COMMUNITY
Start: 2023-12-06 | End: 2023-12-29 | Stop reason: HOSPADM

## 2023-12-15 RX ORDER — BLOOD-GLUCOSE METER
1 EACH MISCELLANEOUS DAILY
COMMUNITY
Start: 2023-11-28 | End: 2023-12-29 | Stop reason: HOSPADM

## 2023-12-15 NOTE — PROGRESS NOTES
Alba Mendez is a 33 y.o. at 36w0d here for F/U delivery planning visitfor GDM, referred by Dr. Lara    Her pregnancy is complicated by:   GDM     Overall she reports  feeling well . Feeling +FM. Denies VB, LOF, or CTXs.     Concerns today: no new concerns    Visit Vitals  /77   Wt 79.8 kg (176 lb)   LMP 2023   BMI 33.25 kg/m²   OB Status Pregnant   Smoking Status Never   BSA 1.85 m²      Gen-NAD  Cardiac- good peripheral perfusion  Respiratory- non-labored breathing  Abdomen- soft, non-tender, gravid   Extremities- symmetrical   Pelvic- deferred      Sono- EFW 92%, AC 96%, BPP 8/8, normal ELINA    Problem List Items Addressed This Visit          Pregnancy    Excessive fetal growth affecting management of pregnancy in third trimester    Overview     -Already planning   -Increased interval growth on US today, prior AGA fetus  -Delivery planned via  38 wks             Other    Gestational diabetes - Primary    Overview     -Shared Care with Dr. Lara  - Attended [facility] [place]  - MFM Consult completed  -MFM 36 wk visit (12/15)  -Serial growth ultrasounds starting at 28 weeks    Last ultrasound: 12/15- EFW 92%, AC 96%, BPP 8/8, normal ELINA    Fetal surveillance:  -Weekly at 32 weeks  -Twice weekly at 36 weeks    Current Regimen: NPH 16/16--> NPH 16/20* (12/15)-- with review of BG log 3/7 fastings were elevated or borderline.     Delivery Plan: 38wks (She is already scheduled for 1/4- 38.6wga with Dr. Lara)  Intrapartum:  GDM protocol  Postpartum: No medication    Recommend PP 2hr gtt and q3yr F/U with PCP for A1C and TSH            Other Visit Diagnoses       36 weeks gestation of pregnancy        Relevant Orders    POC Urine Dip (Completed)               Continue primary OB care with Dr. Lara   Continue weekly communication with blood sugar line until delivery  Recommend delivery 38 wks         SAMANTHA Bravo-CNP

## 2023-12-15 NOTE — PROGRESS NOTES
"Subjective   Patient ID [id number]   Alba Mendez is a 33 y.o.  at 36w0d with a working estimated date of delivery of 2024, by Ultrasound who presents for a routine prenatal visit. She denies vaginal bleeding, leakage of fluid, decreased fetal movements, or contractions.    Her pregnancy is complicated by:  Gestational diabetes, now on insulin.  Using 20 units in the evening, 16 units in the morning.  BPP today was 8 out of 8.  Baby is in breech presentation.  LGA, estimated weight 92 percentile, AC 96 percentile.  Plan repeat   at 1030, declines BTL.    Objective   Physical Exam:   Weight: 79.8 kg (176 lb)  Expected Total Weight Gain: Could not be calculated   Pregravid BMI: Could not be calculated  BP: 136/79       No NST, just had BPP of 8 out of 8 and needs to leave to  daughter           Prenatal Labs  Urine Dip:  Lab Results   Component Value Date    KETONESU 20 (1+) (A) 2023     Lab Results   Component Value Date    HGB 10.8 (L) 10/11/2023    HCT 32.9 (L) 10/11/2023    ABO A 2023    HEPBSAG NONREACTIVE 2023     No results found for: \"PAPPA\", \"AFP\", \"HCG\", \"ESTRIOL\", \"INHBA\"  No results found for: \"GLUF\", \"GLUT1\", \"XBTHYNV4GS\", \"[id number]\"    Imaging  The most recent ultrasound was performed on  12/15/23 with a study GA of 36.0  and EFW of 92%tile (AC 96%tile) .          Assessment/Plan     Continue prenatal vitamin.  Labs reviewed.  GBS neg.  Expected mode of delivery Repeat c/s 24 at 38.6 weeks.  Follow up in 1 week for a routine prenatal visit and NST.  "

## 2023-12-22 ENCOUNTER — ROUTINE PRENATAL (OUTPATIENT)
Dept: OBSTETRICS AND GYNECOLOGY | Facility: CLINIC | Age: 33
End: 2023-12-22
Payer: COMMERCIAL

## 2023-12-22 VITALS — DIASTOLIC BLOOD PRESSURE: 89 MMHG | WEIGHT: 175 LBS | SYSTOLIC BLOOD PRESSURE: 141 MMHG | BODY MASS INDEX: 33.07 KG/M2

## 2023-12-22 DIAGNOSIS — O24.414 INSULIN CONTROLLED GESTATIONAL DIABETES MELLITUS (GDM) IN THIRD TRIMESTER (HHS-HCC): Primary | ICD-10-CM

## 2023-12-22 DIAGNOSIS — Z3A.37 37 WEEKS GESTATION OF PREGNANCY (HHS-HCC): ICD-10-CM

## 2023-12-22 DIAGNOSIS — Z34.93 THIRD TRIMESTER PREGNANCY (HHS-HCC): ICD-10-CM

## 2023-12-22 PROCEDURE — 59025 FETAL NON-STRESS TEST: CPT | Performed by: OBSTETRICS & GYNECOLOGY

## 2023-12-22 PROCEDURE — 0501F PRENATAL FLOW SHEET: CPT | Performed by: OBSTETRICS & GYNECOLOGY

## 2023-12-22 NOTE — PROGRESS NOTES
"Subjective   Patient ID [id number]   Alba Mendez is a 33 y.o.  at 37w0d with a working estimated date of delivery of 2024, by Ultrasound who presents for a routine prenatal visit. She denies vaginal bleeding, leakage of fluid, decreased fetal movements, or contractions.    Her pregnancy is complicated by:  Breech, LGA, previous c/s, A2 DM on 20 unit insulin q pm, 16 units in am.    Objective   Physical Exam:   Weight: 79.4 kg (175 lb)  Expected Total Weight Gain: Could not be calculated   Pregravid BMI: Could not be calculated  BP: 141/89       NST was performed today.  There is good long-term variability.  Baseline is 145, accelerations to 173, 15x15 sec, no decelerations.  Category 1, reactive NST.           Prenatal Labs  Urine Dip:  Lab Results   Component Value Date    KETONESU NEGATIVE 12/15/2023     Lab Results   Component Value Date    HGB 10.8 (L) 10/11/2023    HCT 32.9 (L) 10/11/2023    ABO A 2023    HEPBSAG NONREACTIVE 2023     No results found for: \"PAPPA\", \"AFP\", \"HCG\", \"ESTRIOL\", \"INHBA\"  No results found for: \"GLUF\", \"GLUT1\", \"SISALBH1LW\", \"NZQFBSQ1XV\"    Imaging  The most recent ultrasound was performed on 12/15/23  with a study GA of 36.0  and EFW of 92%, AC=96%, BREECH .          Assessment/Plan     Continue prenatal vitamin.  Labs reviewed.  GBS negative..  Expected mode of delivery Repeat c/s #2 on 24 at 10:30 am (declines BTL, but state form signed 23)  Follow up in 1 week for a routine prenatal visit.  "

## 2023-12-26 ENCOUNTER — ANESTHESIA (OUTPATIENT)
Dept: OBSTETRICS AND GYNECOLOGY | Facility: HOSPITAL | Age: 33
End: 2023-12-26
Payer: COMMERCIAL

## 2023-12-26 ENCOUNTER — ANESTHESIA EVENT (OUTPATIENT)
Dept: OBSTETRICS AND GYNECOLOGY | Facility: HOSPITAL | Age: 33
End: 2023-12-26
Payer: COMMERCIAL

## 2023-12-26 ENCOUNTER — HOSPITAL ENCOUNTER (INPATIENT)
Facility: HOSPITAL | Age: 33
LOS: 3 days | Discharge: HOME | End: 2023-12-29
Attending: OBSTETRICS & GYNECOLOGY | Admitting: OBSTETRICS & GYNECOLOGY
Payer: COMMERCIAL

## 2023-12-26 ENCOUNTER — HOSPITAL ENCOUNTER (INPATIENT)
Facility: HOSPITAL | Age: 33
LOS: 1 days | Discharge: STILL ADMITTED OR ADMINISTRATIVE | End: 2023-12-26
Attending: OBSTETRICS & GYNECOLOGY
Payer: COMMERCIAL

## 2023-12-26 VITALS
RESPIRATION RATE: 18 BRPM | OXYGEN SATURATION: 95 % | DIASTOLIC BLOOD PRESSURE: 75 MMHG | SYSTOLIC BLOOD PRESSURE: 120 MMHG | HEART RATE: 101 BPM | BODY MASS INDEX: 33.55 KG/M2 | HEIGHT: 61 IN | TEMPERATURE: 97.9 F | WEIGHT: 177.69 LBS

## 2023-12-26 PROBLEM — Z28.39 RUBELLA NON-IMMUNE STATUS, ANTEPARTUM (HHS-HCC): Status: ACTIVE | Noted: 2023-12-26

## 2023-12-26 PROBLEM — O09.899 RUBELLA NON-IMMUNE STATUS, ANTEPARTUM (HHS-HCC): Status: ACTIVE | Noted: 2023-12-26

## 2023-12-26 LAB
ABO GROUP (TYPE) IN BLOOD: NORMAL
ANTIBODY SCREEN: NORMAL
ERYTHROCYTE [DISTWIDTH] IN BLOOD BY AUTOMATED COUNT: 12.8 % (ref 11.5–14.5)
GLUCOSE BLD MANUAL STRIP-MCNC: 105 MG/DL (ref 74–99)
GLUCOSE BLD MANUAL STRIP-MCNC: 46 MG/DL (ref 74–99)
GLUCOSE BLD MANUAL STRIP-MCNC: 57 MG/DL (ref 74–99)
GLUCOSE BLD MANUAL STRIP-MCNC: 86 MG/DL (ref 74–99)
HCT VFR BLD AUTO: 33.4 % (ref 36–46)
HGB BLD-MCNC: 11 G/DL (ref 12–16)
MCH RBC QN AUTO: 29.7 PG (ref 26–34)
MCHC RBC AUTO-ENTMCNC: 32.9 G/DL (ref 32–36)
MCV RBC AUTO: 90 FL (ref 80–100)
NRBC BLD-RTO: 0 /100 WBCS (ref 0–0)
PLATELET # BLD AUTO: 179 X10*3/UL (ref 150–450)
POC APPEARANCE, URINE: CLEAR
POC BILIRUBIN, URINE: NEGATIVE
POC BLOOD, URINE: NEGATIVE
POC COLOR, URINE: YELLOW
POC GLUCOSE, URINE: NEGATIVE MG/DL
POC KETONES, URINE: NEGATIVE MG/DL
POC LEUKOCYTES, URINE: NEGATIVE
POC NITRITE,URINE: NEGATIVE
POC PH, URINE: 6 PH
POC PROTEIN, URINE: NEGATIVE MG/DL
POC SPECIFIC GRAVITY, URINE: 1.01
POC UROBILINOGEN, URINE: 0.2 EU/DL
RBC # BLD AUTO: 3.7 X10*6/UL (ref 4–5.2)
RH FACTOR (ANTIGEN D): NORMAL
T PALLIDUM AB SER QL: NONREACTIVE
WBC # BLD AUTO: 5.2 X10*3/UL (ref 4.4–11.3)

## 2023-12-26 PROCEDURE — 2500000004 HC RX 250 GENERAL PHARMACY W/ HCPCS (ALT 636 FOR OP/ED)

## 2023-12-26 PROCEDURE — 86901 BLOOD TYPING SEROLOGIC RH(D): CPT

## 2023-12-26 PROCEDURE — 88307 TISSUE EXAM BY PATHOLOGIST: CPT | Mod: TC,SUR

## 2023-12-26 PROCEDURE — 7100000016 HC LABOR RECOVERY PER HOUR

## 2023-12-26 PROCEDURE — 01961 ANES CESAREAN DELIVERY ONLY: CPT | Performed by: ANESTHESIOLOGIST ASSISTANT

## 2023-12-26 PROCEDURE — 86920 COMPATIBILITY TEST SPIN: CPT

## 2023-12-26 PROCEDURE — 3700000018 HC OB ANESTHESIA C-SECTION: Performed by: OBSTETRICS & GYNECOLOGY

## 2023-12-26 PROCEDURE — 01961 ANES CESAREAN DELIVERY ONLY: CPT | Performed by: STUDENT IN AN ORGANIZED HEALTH CARE EDUCATION/TRAINING PROGRAM

## 2023-12-26 PROCEDURE — 82947 ASSAY GLUCOSE BLOOD QUANT: CPT

## 2023-12-26 PROCEDURE — 59514 CESAREAN DELIVERY ONLY: CPT | Performed by: OBSTETRICS & GYNECOLOGY

## 2023-12-26 PROCEDURE — 3700000014 HC AN EPIDURAL BLOCK CHARGE: Performed by: OBSTETRICS & GYNECOLOGY

## 2023-12-26 PROCEDURE — 2500000005 HC RX 250 GENERAL PHARMACY W/O HCPCS: Performed by: ANESTHESIOLOGIST ASSISTANT

## 2023-12-26 PROCEDURE — 36415 COLL VENOUS BLD VENIPUNCTURE: CPT

## 2023-12-26 PROCEDURE — 81002 URINALYSIS NONAUTO W/O SCOPE: CPT

## 2023-12-26 PROCEDURE — 85027 COMPLETE CBC AUTOMATED: CPT

## 2023-12-26 PROCEDURE — 2500000005 HC RX 250 GENERAL PHARMACY W/O HCPCS

## 2023-12-26 PROCEDURE — 96372 THER/PROPH/DIAG INJ SC/IM: CPT

## 2023-12-26 PROCEDURE — 1100000001 HC PRIVATE ROOM DAILY

## 2023-12-26 PROCEDURE — 99285 EMERGENCY DEPT VISIT HI MDM: CPT

## 2023-12-26 PROCEDURE — 99215 OFFICE O/P EST HI 40 MIN: CPT | Mod: 25

## 2023-12-26 PROCEDURE — 1850000001 HC LEAVE OF ABSENCE - HOSPITAL SERVICES

## 2023-12-26 PROCEDURE — 2720000007 HC OR 272 NO HCPCS: Performed by: OBSTETRICS & GYNECOLOGY

## 2023-12-26 PROCEDURE — 59510 CESAREAN DELIVERY: CPT | Performed by: STUDENT IN AN ORGANIZED HEALTH CARE EDUCATION/TRAINING PROGRAM

## 2023-12-26 PROCEDURE — 86780 TREPONEMA PALLIDUM: CPT

## 2023-12-26 PROCEDURE — 2500000001 HC RX 250 WO HCPCS SELF ADMINISTERED DRUGS (ALT 637 FOR MEDICARE OP): Performed by: NURSE PRACTITIONER

## 2023-12-26 PROCEDURE — 1120000001 HC OB PRIVATE ROOM DAILY

## 2023-12-26 PROCEDURE — 2500000004 HC RX 250 GENERAL PHARMACY W/ HCPCS (ALT 636 FOR OP/ED): Performed by: ANESTHESIOLOGIST ASSISTANT

## 2023-12-26 PROCEDURE — 88307 TISSUE EXAM BY PATHOLOGIST: CPT | Performed by: PATHOLOGY

## 2023-12-26 PROCEDURE — 7100000016 HC LABOR RECOVERY PER HOUR: Performed by: OBSTETRICS & GYNECOLOGY

## 2023-12-26 PROCEDURE — 51702 INSERT TEMP BLADDER CATH: CPT

## 2023-12-26 RX ORDER — KETAMINE HYDROCHLORIDE 10 MG/ML
INJECTION, SOLUTION INTRAMUSCULAR; INTRAVENOUS AS NEEDED
Status: DISCONTINUED | OUTPATIENT
Start: 2023-12-26 | End: 2023-12-26

## 2023-12-26 RX ORDER — NIFEDIPINE 10 MG/1
10 CAPSULE ORAL ONCE AS NEEDED
Status: DISCONTINUED | OUTPATIENT
Start: 2023-12-26 | End: 2023-12-27 | Stop reason: HOSPADM

## 2023-12-26 RX ORDER — BISACODYL 10 MG/1
10 SUPPOSITORY RECTAL DAILY PRN
Status: DISCONTINUED | OUTPATIENT
Start: 2023-12-26 | End: 2023-12-29 | Stop reason: HOSPADM

## 2023-12-26 RX ORDER — LOPERAMIDE HYDROCHLORIDE 2 MG/1
4 CAPSULE ORAL EVERY 2 HOUR PRN
Status: DISCONTINUED | OUTPATIENT
Start: 2023-12-26 | End: 2023-12-26

## 2023-12-26 RX ORDER — LOPERAMIDE HYDROCHLORIDE 2 MG/1
4 CAPSULE ORAL EVERY 2 HOUR PRN
Status: DISCONTINUED | OUTPATIENT
Start: 2023-12-26 | End: 2023-12-27 | Stop reason: HOSPADM

## 2023-12-26 RX ORDER — METOCLOPRAMIDE 10 MG/1
10 TABLET ORAL ONCE
Status: COMPLETED | OUTPATIENT
Start: 2023-12-26 | End: 2023-12-26

## 2023-12-26 RX ORDER — OXYTOCIN/0.9 % SODIUM CHLORIDE 30/500 ML
60 PLASTIC BAG, INJECTION (ML) INTRAVENOUS ONCE AS NEEDED
Status: DISCONTINUED | OUTPATIENT
Start: 2023-12-26 | End: 2023-12-26

## 2023-12-26 RX ORDER — DIPHENHYDRAMINE HCL 25 MG
25 CAPSULE ORAL EVERY 4 HOURS PRN
Status: DISCONTINUED | OUTPATIENT
Start: 2023-12-26 | End: 2023-12-29 | Stop reason: HOSPADM

## 2023-12-26 RX ORDER — OXYCODONE HYDROCHLORIDE 5 MG/1
10 TABLET ORAL EVERY 4 HOURS PRN
Status: DISCONTINUED | OUTPATIENT
Start: 2023-12-27 | End: 2023-12-27 | Stop reason: HOSPADM

## 2023-12-26 RX ORDER — ADHESIVE BANDAGE
10 BANDAGE TOPICAL
Status: DISCONTINUED | OUTPATIENT
Start: 2023-12-26 | End: 2023-12-29 | Stop reason: HOSPADM

## 2023-12-26 RX ORDER — LABETALOL HYDROCHLORIDE 5 MG/ML
20 INJECTION, SOLUTION INTRAVENOUS ONCE AS NEEDED
Status: DISCONTINUED | OUTPATIENT
Start: 2023-12-26 | End: 2023-12-27 | Stop reason: HOSPADM

## 2023-12-26 RX ORDER — OXYTOCIN/0.9 % SODIUM CHLORIDE 30/500 ML
60 PLASTIC BAG, INJECTION (ML) INTRAVENOUS ONCE AS NEEDED
Status: DISCONTINUED | OUTPATIENT
Start: 2023-12-26 | End: 2023-12-27

## 2023-12-26 RX ORDER — NIFEDIPINE 10 MG/1
10 CAPSULE ORAL ONCE AS NEEDED
Status: DISCONTINUED | OUTPATIENT
Start: 2023-12-26 | End: 2023-12-26

## 2023-12-26 RX ORDER — OXYTOCIN 10 [USP'U]/ML
10 INJECTION, SOLUTION INTRAMUSCULAR; INTRAVENOUS ONCE AS NEEDED
Status: DISCONTINUED | OUTPATIENT
Start: 2023-12-26 | End: 2023-12-27 | Stop reason: HOSPADM

## 2023-12-26 RX ORDER — CARBOPROST TROMETHAMINE 250 UG/ML
250 INJECTION, SOLUTION INTRAMUSCULAR ONCE AS NEEDED
Status: DISCONTINUED | OUTPATIENT
Start: 2023-12-26 | End: 2023-12-29 | Stop reason: HOSPADM

## 2023-12-26 RX ORDER — NALBUPHINE HYDROCHLORIDE 10 MG/ML
5 INJECTION, SOLUTION INTRAMUSCULAR; INTRAVENOUS; SUBCUTANEOUS
Status: DISPENSED | OUTPATIENT
Start: 2023-12-26 | End: 2023-12-27

## 2023-12-26 RX ORDER — LIDOCAINE HCL/EPINEPHRINE/PF 2%-1:200K
VIAL (ML) INJECTION AS NEEDED
Status: DISCONTINUED | OUTPATIENT
Start: 2023-12-26 | End: 2023-12-26

## 2023-12-26 RX ORDER — MORPHINE SULFATE 0.5 MG/ML
INJECTION, SOLUTION EPIDURAL; INTRATHECAL; INTRAVENOUS AS NEEDED
Status: DISCONTINUED | OUTPATIENT
Start: 2023-12-26 | End: 2023-12-26

## 2023-12-26 RX ORDER — PROPOFOL 10 MG/ML
INJECTION, EMULSION INTRAVENOUS AS NEEDED
Status: DISCONTINUED | OUTPATIENT
Start: 2023-12-26 | End: 2023-12-26

## 2023-12-26 RX ORDER — MISOPROSTOL 200 UG/1
800 TABLET ORAL ONCE AS NEEDED
Status: DISCONTINUED | OUTPATIENT
Start: 2023-12-26 | End: 2023-12-26

## 2023-12-26 RX ORDER — OXYCODONE HYDROCHLORIDE 5 MG/1
10 TABLET ORAL EVERY 4 HOURS PRN
Status: DISCONTINUED | OUTPATIENT
Start: 2023-12-27 | End: 2023-12-29 | Stop reason: HOSPADM

## 2023-12-26 RX ORDER — METHYLERGONOVINE MALEATE 0.2 MG/ML
0.2 INJECTION INTRAVENOUS ONCE AS NEEDED
Status: COMPLETED | OUTPATIENT
Start: 2023-12-26 | End: 2023-12-26

## 2023-12-26 RX ORDER — OXYCODONE HYDROCHLORIDE 5 MG/1
5 TABLET ORAL EVERY 4 HOURS PRN
Status: DISCONTINUED | OUTPATIENT
Start: 2023-12-27 | End: 2023-12-27 | Stop reason: HOSPADM

## 2023-12-26 RX ORDER — OXYTOCIN 10 [USP'U]/ML
10 INJECTION, SOLUTION INTRAMUSCULAR; INTRAVENOUS ONCE AS NEEDED
Status: DISCONTINUED | OUTPATIENT
Start: 2023-12-26 | End: 2023-12-26

## 2023-12-26 RX ORDER — ONDANSETRON HYDROCHLORIDE 2 MG/ML
4 INJECTION, SOLUTION INTRAVENOUS EVERY 6 HOURS PRN
Status: DISCONTINUED | OUTPATIENT
Start: 2023-12-26 | End: 2023-12-26

## 2023-12-26 RX ORDER — CARBOPROST TROMETHAMINE 250 UG/ML
250 INJECTION, SOLUTION INTRAMUSCULAR ONCE AS NEEDED
Status: DISCONTINUED | OUTPATIENT
Start: 2023-12-26 | End: 2023-12-26

## 2023-12-26 RX ORDER — INSULIN LISPRO 100 [IU]/ML
0-10 INJECTION, SOLUTION INTRAVENOUS; SUBCUTANEOUS
Status: DISCONTINUED | OUTPATIENT
Start: 2023-12-26 | End: 2023-12-26

## 2023-12-26 RX ORDER — NIFEDIPINE 10 MG/1
10 CAPSULE ORAL ONCE AS NEEDED
Status: DISCONTINUED | OUTPATIENT
Start: 2023-12-26 | End: 2023-12-29 | Stop reason: HOSPADM

## 2023-12-26 RX ORDER — HYDRALAZINE HYDROCHLORIDE 20 MG/ML
5 INJECTION INTRAMUSCULAR; INTRAVENOUS ONCE AS NEEDED
Status: DISCONTINUED | OUTPATIENT
Start: 2023-12-26 | End: 2023-12-29 | Stop reason: HOSPADM

## 2023-12-26 RX ORDER — FAMOTIDINE 10 MG/ML
INJECTION INTRAVENOUS AS NEEDED
Status: DISCONTINUED | OUTPATIENT
Start: 2023-12-26 | End: 2023-12-26

## 2023-12-26 RX ORDER — KETOROLAC TROMETHAMINE 30 MG/ML
30 INJECTION, SOLUTION INTRAMUSCULAR; INTRAVENOUS EVERY 6 HOURS
Status: COMPLETED | OUTPATIENT
Start: 2023-12-26 | End: 2023-12-27

## 2023-12-26 RX ORDER — CYCLOBENZAPRINE HCL 10 MG
10 TABLET ORAL ONCE
Status: DISCONTINUED | OUTPATIENT
Start: 2023-12-26 | End: 2023-12-26

## 2023-12-26 RX ORDER — SIMETHICONE 80 MG
80 TABLET,CHEWABLE ORAL 4 TIMES DAILY PRN
Status: DISCONTINUED | OUTPATIENT
Start: 2023-12-26 | End: 2023-12-29 | Stop reason: HOSPADM

## 2023-12-26 RX ORDER — HYDRALAZINE HYDROCHLORIDE 20 MG/ML
5 INJECTION INTRAMUSCULAR; INTRAVENOUS ONCE AS NEEDED
Status: DISCONTINUED | OUTPATIENT
Start: 2023-12-26 | End: 2023-12-27 | Stop reason: HOSPADM

## 2023-12-26 RX ORDER — NALBUPHINE HYDROCHLORIDE 10 MG/ML
5 INJECTION, SOLUTION INTRAMUSCULAR; INTRAVENOUS; SUBCUTANEOUS
Status: DISCONTINUED | OUTPATIENT
Start: 2023-12-26 | End: 2023-12-27 | Stop reason: HOSPADM

## 2023-12-26 RX ORDER — HYDROMORPHONE HYDROCHLORIDE 1 MG/ML
0.2 INJECTION, SOLUTION INTRAMUSCULAR; INTRAVENOUS; SUBCUTANEOUS EVERY 5 MIN PRN
Status: DISCONTINUED | OUTPATIENT
Start: 2023-12-26 | End: 2023-12-29 | Stop reason: HOSPADM

## 2023-12-26 RX ORDER — ACETAMINOPHEN 325 MG/1
975 TABLET ORAL ONCE
Status: DISCONTINUED | OUTPATIENT
Start: 2023-12-26 | End: 2023-12-26

## 2023-12-26 RX ORDER — TRANEXAMIC ACID 100 MG/ML
1000 INJECTION, SOLUTION INTRAVENOUS ONCE AS NEEDED
Status: DISCONTINUED | OUTPATIENT
Start: 2023-12-26 | End: 2023-12-27 | Stop reason: HOSPADM

## 2023-12-26 RX ORDER — NALOXONE HYDROCHLORIDE 0.4 MG/ML
0.1 INJECTION, SOLUTION INTRAMUSCULAR; INTRAVENOUS; SUBCUTANEOUS EVERY 5 MIN PRN
Status: DISCONTINUED | OUTPATIENT
Start: 2023-12-26 | End: 2023-12-29 | Stop reason: HOSPADM

## 2023-12-26 RX ORDER — ONDANSETRON 4 MG/1
4 TABLET, FILM COATED ORAL EVERY 6 HOURS PRN
Status: DISCONTINUED | OUTPATIENT
Start: 2023-12-26 | End: 2023-12-29 | Stop reason: HOSPADM

## 2023-12-26 RX ORDER — SIMETHICONE 80 MG
80 TABLET,CHEWABLE ORAL 4 TIMES DAILY PRN
Status: DISCONTINUED | OUTPATIENT
Start: 2023-12-26 | End: 2023-12-27 | Stop reason: HOSPADM

## 2023-12-26 RX ORDER — ADHESIVE BANDAGE
10 BANDAGE TOPICAL
Status: DISCONTINUED | OUTPATIENT
Start: 2023-12-26 | End: 2023-12-27 | Stop reason: HOSPADM

## 2023-12-26 RX ORDER — CEFAZOLIN 1 G/1
INJECTION, POWDER, FOR SOLUTION INTRAVENOUS AS NEEDED
Status: DISCONTINUED | OUTPATIENT
Start: 2023-12-26 | End: 2023-12-26

## 2023-12-26 RX ORDER — OXYTOCIN/0.9 % SODIUM CHLORIDE 30/500 ML
60 PLASTIC BAG, INJECTION (ML) INTRAVENOUS ONCE AS NEEDED
Status: DISCONTINUED | OUTPATIENT
Start: 2023-12-26 | End: 2023-12-27 | Stop reason: HOSPADM

## 2023-12-26 RX ORDER — METHYLERGONOVINE MALEATE 0.2 MG/ML
0.2 INJECTION INTRAVENOUS ONCE AS NEEDED
Status: DISCONTINUED | OUTPATIENT
Start: 2023-12-26 | End: 2023-12-27 | Stop reason: HOSPADM

## 2023-12-26 RX ORDER — FENTANYL CITRATE 50 UG/ML
INJECTION, SOLUTION INTRAMUSCULAR; INTRAVENOUS AS NEEDED
Status: DISCONTINUED | OUTPATIENT
Start: 2023-12-26 | End: 2023-12-26

## 2023-12-26 RX ORDER — POLYETHYLENE GLYCOL 3350 17 G/17G
17 POWDER, FOR SOLUTION ORAL 2 TIMES DAILY PRN
Status: DISCONTINUED | OUTPATIENT
Start: 2023-12-26 | End: 2023-12-27 | Stop reason: HOSPADM

## 2023-12-26 RX ORDER — ENOXAPARIN SODIUM 100 MG/ML
40 INJECTION SUBCUTANEOUS EVERY 24 HOURS
Status: DISCONTINUED | OUTPATIENT
Start: 2023-12-27 | End: 2023-12-29 | Stop reason: HOSPADM

## 2023-12-26 RX ORDER — DIPHENHYDRAMINE HCL 25 MG
25 CAPSULE ORAL EVERY 4 HOURS PRN
Status: DISCONTINUED | OUTPATIENT
Start: 2023-12-26 | End: 2023-12-27 | Stop reason: HOSPADM

## 2023-12-26 RX ORDER — BISACODYL 10 MG/1
10 SUPPOSITORY RECTAL DAILY PRN
Status: DISCONTINUED | OUTPATIENT
Start: 2023-12-26 | End: 2023-12-27 | Stop reason: HOSPADM

## 2023-12-26 RX ORDER — DIPHENHYDRAMINE HYDROCHLORIDE 50 MG/ML
25 INJECTION INTRAMUSCULAR; INTRAVENOUS EVERY 4 HOURS PRN
Status: DISCONTINUED | OUTPATIENT
Start: 2023-12-26 | End: 2023-12-27 | Stop reason: HOSPADM

## 2023-12-26 RX ORDER — ONDANSETRON HYDROCHLORIDE 2 MG/ML
4 INJECTION, SOLUTION INTRAVENOUS EVERY 6 HOURS PRN
Status: DISCONTINUED | OUTPATIENT
Start: 2023-12-26 | End: 2023-12-29 | Stop reason: HOSPADM

## 2023-12-26 RX ORDER — METHYLERGONOVINE MALEATE 0.2 MG/ML
0.2 INJECTION INTRAVENOUS ONCE AS NEEDED
Status: DISCONTINUED | OUTPATIENT
Start: 2023-12-26 | End: 2023-12-29 | Stop reason: HOSPADM

## 2023-12-26 RX ORDER — TERBUTALINE SULFATE 1 MG/ML
0.25 INJECTION SUBCUTANEOUS ONCE AS NEEDED
Status: DISCONTINUED | OUTPATIENT
Start: 2023-12-26 | End: 2023-12-26

## 2023-12-26 RX ORDER — ACETAMINOPHEN 325 MG/1
975 TABLET ORAL EVERY 6 HOURS
Status: DISCONTINUED | OUTPATIENT
Start: 2023-12-26 | End: 2023-12-27 | Stop reason: HOSPADM

## 2023-12-26 RX ORDER — ONDANSETRON 4 MG/1
4 TABLET, FILM COATED ORAL EVERY 6 HOURS PRN
Status: DISCONTINUED | OUTPATIENT
Start: 2023-12-26 | End: 2023-12-27 | Stop reason: HOSPADM

## 2023-12-26 RX ORDER — LIDOCAINE HYDROCHLORIDE 10 MG/ML
30 INJECTION INFILTRATION; PERINEURAL ONCE AS NEEDED
Status: DISCONTINUED | OUTPATIENT
Start: 2023-12-26 | End: 2023-12-26

## 2023-12-26 RX ORDER — DIPHENHYDRAMINE HYDROCHLORIDE 50 MG/ML
25 INJECTION INTRAMUSCULAR; INTRAVENOUS EVERY 4 HOURS PRN
Status: DISCONTINUED | OUTPATIENT
Start: 2023-12-26 | End: 2023-12-29 | Stop reason: HOSPADM

## 2023-12-26 RX ORDER — PHENYLEPHRINE 10 MG/250 ML(40 MCG/ML)IN 0.9 % SOD.CHLORIDE INTRAVENOUS
CONTINUOUS PRN
Status: DISCONTINUED | OUTPATIENT
Start: 2023-12-26 | End: 2023-12-26

## 2023-12-26 RX ORDER — METOCLOPRAMIDE HYDROCHLORIDE 5 MG/ML
10 INJECTION INTRAMUSCULAR; INTRAVENOUS EVERY 6 HOURS PRN
Status: DISCONTINUED | OUTPATIENT
Start: 2023-12-26 | End: 2023-12-26

## 2023-12-26 RX ORDER — ONDANSETRON 4 MG/1
4 TABLET, FILM COATED ORAL EVERY 6 HOURS PRN
Status: DISCONTINUED | OUTPATIENT
Start: 2023-12-26 | End: 2023-12-26

## 2023-12-26 RX ORDER — SODIUM CHLORIDE, SODIUM LACTATE, POTASSIUM CHLORIDE, CALCIUM CHLORIDE 600; 310; 30; 20 MG/100ML; MG/100ML; MG/100ML; MG/100ML
125 INJECTION, SOLUTION INTRAVENOUS CONTINUOUS
Status: DISCONTINUED | OUTPATIENT
Start: 2023-12-26 | End: 2023-12-26

## 2023-12-26 RX ORDER — METOCLOPRAMIDE 10 MG/1
10 TABLET ORAL ONCE
Status: DISCONTINUED | OUTPATIENT
Start: 2023-12-26 | End: 2023-12-26

## 2023-12-26 RX ORDER — IBUPROFEN 600 MG/1
600 TABLET ORAL EVERY 6 HOURS
Status: DISCONTINUED | OUTPATIENT
Start: 2023-12-27 | End: 2023-12-27 | Stop reason: HOSPADM

## 2023-12-26 RX ORDER — ENOXAPARIN SODIUM 100 MG/ML
40 INJECTION SUBCUTANEOUS EVERY 24 HOURS
Status: DISCONTINUED | OUTPATIENT
Start: 2023-12-27 | End: 2023-12-27 | Stop reason: HOSPADM

## 2023-12-26 RX ORDER — POLYETHYLENE GLYCOL 3350 17 G/17G
17 POWDER, FOR SOLUTION ORAL 2 TIMES DAILY PRN
Status: DISCONTINUED | OUTPATIENT
Start: 2023-12-26 | End: 2023-12-29 | Stop reason: HOSPADM

## 2023-12-26 RX ORDER — DEXTROSE 40 %
15 GEL (GRAM) ORAL
Status: DISCONTINUED | OUTPATIENT
Start: 2023-12-26 | End: 2023-12-26

## 2023-12-26 RX ORDER — TRANEXAMIC ACID 100 MG/ML
1000 INJECTION, SOLUTION INTRAVENOUS ONCE AS NEEDED
Status: COMPLETED | OUTPATIENT
Start: 2023-12-26 | End: 2023-12-26

## 2023-12-26 RX ORDER — LOPERAMIDE HYDROCHLORIDE 2 MG/1
4 CAPSULE ORAL EVERY 2 HOUR PRN
Status: DISCONTINUED | OUTPATIENT
Start: 2023-12-26 | End: 2023-12-29 | Stop reason: HOSPADM

## 2023-12-26 RX ORDER — CARBOPROST TROMETHAMINE 250 UG/ML
250 INJECTION, SOLUTION INTRAMUSCULAR ONCE AS NEEDED
Status: DISCONTINUED | OUTPATIENT
Start: 2023-12-26 | End: 2023-12-27 | Stop reason: HOSPADM

## 2023-12-26 RX ORDER — NALOXONE HYDROCHLORIDE 0.4 MG/ML
0.1 INJECTION, SOLUTION INTRAMUSCULAR; INTRAVENOUS; SUBCUTANEOUS EVERY 5 MIN PRN
Status: DISCONTINUED | OUTPATIENT
Start: 2023-12-26 | End: 2023-12-27 | Stop reason: HOSPADM

## 2023-12-26 RX ORDER — DEXTROSE 50 % IN WATER (D50W) INTRAVENOUS SYRINGE
25
Status: DISCONTINUED | OUTPATIENT
Start: 2023-12-26 | End: 2023-12-27 | Stop reason: HOSPADM

## 2023-12-26 RX ORDER — MISOPROSTOL 200 UG/1
800 TABLET ORAL ONCE AS NEEDED
Status: DISCONTINUED | OUTPATIENT
Start: 2023-12-26 | End: 2023-12-29 | Stop reason: HOSPADM

## 2023-12-26 RX ORDER — OXYTOCIN 10 [USP'U]/ML
10 INJECTION, SOLUTION INTRAMUSCULAR; INTRAVENOUS ONCE AS NEEDED
Status: DISCONTINUED | OUTPATIENT
Start: 2023-12-26 | End: 2023-12-29 | Stop reason: HOSPADM

## 2023-12-26 RX ORDER — BUPIVACAINE HYDROCHLORIDE 7.5 MG/ML
INJECTION, SOLUTION INTRASPINAL AS NEEDED
Status: DISCONTINUED | OUTPATIENT
Start: 2023-12-26 | End: 2023-12-26

## 2023-12-26 RX ORDER — KETOROLAC TROMETHAMINE 30 MG/ML
INJECTION, SOLUTION INTRAMUSCULAR; INTRAVENOUS AS NEEDED
Status: DISCONTINUED | OUTPATIENT
Start: 2023-12-26 | End: 2023-12-26

## 2023-12-26 RX ORDER — OXYTOCIN 10 [USP'U]/ML
10 INJECTION, SOLUTION INTRAMUSCULAR; INTRAVENOUS ONCE AS NEEDED
Status: DISCONTINUED | OUTPATIENT
Start: 2023-12-26 | End: 2023-12-27

## 2023-12-26 RX ORDER — IBUPROFEN 600 MG/1
600 TABLET ORAL EVERY 6 HOURS
Status: DISCONTINUED | OUTPATIENT
Start: 2023-12-27 | End: 2023-12-29 | Stop reason: HOSPADM

## 2023-12-26 RX ORDER — OXYCODONE HYDROCHLORIDE 5 MG/1
5 TABLET ORAL EVERY 4 HOURS PRN
Status: DISCONTINUED | OUTPATIENT
Start: 2023-12-27 | End: 2023-12-29 | Stop reason: HOSPADM

## 2023-12-26 RX ORDER — METOCLOPRAMIDE 10 MG/1
10 TABLET ORAL EVERY 6 HOURS PRN
Status: DISCONTINUED | OUTPATIENT
Start: 2023-12-26 | End: 2023-12-26

## 2023-12-26 RX ORDER — MISOPROSTOL 200 UG/1
800 TABLET ORAL ONCE AS NEEDED
Status: DISCONTINUED | OUTPATIENT
Start: 2023-12-26 | End: 2023-12-27 | Stop reason: HOSPADM

## 2023-12-26 RX ORDER — HYDRALAZINE HYDROCHLORIDE 20 MG/ML
5 INJECTION INTRAMUSCULAR; INTRAVENOUS ONCE AS NEEDED
Status: DISCONTINUED | OUTPATIENT
Start: 2023-12-26 | End: 2023-12-26

## 2023-12-26 RX ORDER — LIDOCAINE 560 MG/1
1 PATCH PERCUTANEOUS; TOPICAL; TRANSDERMAL
Status: DISCONTINUED | OUTPATIENT
Start: 2023-12-26 | End: 2023-12-29 | Stop reason: HOSPADM

## 2023-12-26 RX ORDER — LABETALOL HYDROCHLORIDE 5 MG/ML
20 INJECTION, SOLUTION INTRAVENOUS ONCE AS NEEDED
Status: DISCONTINUED | OUTPATIENT
Start: 2023-12-26 | End: 2023-12-26

## 2023-12-26 RX ORDER — TRANEXAMIC ACID 100 MG/ML
1000 INJECTION, SOLUTION INTRAVENOUS ONCE AS NEEDED
Status: DISCONTINUED | OUTPATIENT
Start: 2023-12-26 | End: 2023-12-29 | Stop reason: HOSPADM

## 2023-12-26 RX ORDER — DEXTROSE MONOHYDRATE 100 MG/ML
0.3 INJECTION, SOLUTION INTRAVENOUS ONCE AS NEEDED
Status: DISCONTINUED | OUTPATIENT
Start: 2023-12-26 | End: 2023-12-27 | Stop reason: HOSPADM

## 2023-12-26 RX ORDER — KETOROLAC TROMETHAMINE 30 MG/ML
30 INJECTION, SOLUTION INTRAMUSCULAR; INTRAVENOUS EVERY 6 HOURS
Status: DISCONTINUED | OUTPATIENT
Start: 2023-12-26 | End: 2023-12-27 | Stop reason: HOSPADM

## 2023-12-26 RX ORDER — ONDANSETRON HYDROCHLORIDE 2 MG/ML
4 INJECTION, SOLUTION INTRAVENOUS EVERY 6 HOURS PRN
Status: DISCONTINUED | OUTPATIENT
Start: 2023-12-26 | End: 2023-12-27 | Stop reason: HOSPADM

## 2023-12-26 RX ORDER — LIDOCAINE 560 MG/1
1 PATCH PERCUTANEOUS; TOPICAL; TRANSDERMAL
Status: DISCONTINUED | OUTPATIENT
Start: 2023-12-26 | End: 2023-12-27 | Stop reason: HOSPADM

## 2023-12-26 RX ORDER — CYCLOBENZAPRINE HCL 10 MG
10 TABLET ORAL ONCE
Status: COMPLETED | OUTPATIENT
Start: 2023-12-26 | End: 2023-12-26

## 2023-12-26 RX ORDER — ACETAMINOPHEN 325 MG/1
975 TABLET ORAL EVERY 6 HOURS
Status: DISCONTINUED | OUTPATIENT
Start: 2023-12-26 | End: 2023-12-29 | Stop reason: HOSPADM

## 2023-12-26 RX ORDER — DEXTROSE 50 % IN WATER (D50W) INTRAVENOUS SYRINGE
25
Status: DISCONTINUED | OUTPATIENT
Start: 2023-12-26 | End: 2023-12-26

## 2023-12-26 RX ORDER — LABETALOL HYDROCHLORIDE 5 MG/ML
20 INJECTION, SOLUTION INTRAVENOUS ONCE AS NEEDED
Status: DISCONTINUED | OUTPATIENT
Start: 2023-12-26 | End: 2023-12-29 | Stop reason: HOSPADM

## 2023-12-26 RX ORDER — HYDROMORPHONE HYDROCHLORIDE 1 MG/ML
0.2 INJECTION, SOLUTION INTRAMUSCULAR; INTRAVENOUS; SUBCUTANEOUS EVERY 5 MIN PRN
Status: DISCONTINUED | OUTPATIENT
Start: 2023-12-26 | End: 2023-12-27 | Stop reason: HOSPADM

## 2023-12-26 RX ADMIN — KETOROLAC TROMETHAMINE 30 MG: 30 INJECTION, SOLUTION INTRAMUSCULAR; INTRAVENOUS at 20:57

## 2023-12-26 RX ADMIN — KETAMINE HYDROCHLORIDE 20 MG: 10 INJECTION, SOLUTION INTRAMUSCULAR; INTRAVENOUS at 14:13

## 2023-12-26 RX ADMIN — PROPOFOL 30 MG: 10 INJECTION, EMULSION INTRAVENOUS at 13:30

## 2023-12-26 RX ADMIN — LIDOCAINE 1 PATCH: 4 PATCH TOPICAL at 17:40

## 2023-12-26 RX ADMIN — CEFAZOLIN 2 G: 330 INJECTION, POWDER, FOR SOLUTION INTRAMUSCULAR; INTRAVENOUS at 13:13

## 2023-12-26 RX ADMIN — TRANEXAMIC ACID 810 MG: 100 INJECTION INTRAVENOUS at 14:07

## 2023-12-26 RX ADMIN — DEXMEDETOMIDINE HYDROCHLORIDE 4 MCG: 100 INJECTION, SOLUTION INTRAVENOUS at 13:43

## 2023-12-26 RX ADMIN — KETAMINE HYDROCHLORIDE 30 MG: 10 INJECTION, SOLUTION INTRAMUSCULAR; INTRAVENOUS at 13:43

## 2023-12-26 RX ADMIN — LIDOCAINE HYDROCHLORIDE,EPINEPHRINE BITARTRATE 2 ML: 20; .005 INJECTION, SOLUTION EPIDURAL; INFILTRATION; INTRACAUDAL; PERINEURAL at 13:30

## 2023-12-26 RX ADMIN — SODIUM CHLORIDE 18 MILLI-UNITS/MIN: 9 INJECTION, SOLUTION INTRAVENOUS at 13:31

## 2023-12-26 RX ADMIN — Medication 0.62 MCG/KG/MIN: at 12:48

## 2023-12-26 RX ADMIN — SODIUM CHLORIDE, SODIUM LACTATE, POTASSIUM CHLORIDE, AND CALCIUM CHLORIDE: 600; 310; 30; 20 INJECTION, SOLUTION INTRAVENOUS at 12:46

## 2023-12-26 RX ADMIN — SODIUM CHLORIDE, SODIUM LACTATE, POTASSIUM CHLORIDE, AND CALCIUM CHLORIDE: 600; 310; 30; 20 INJECTION, SOLUTION INTRAVENOUS at 13:41

## 2023-12-26 RX ADMIN — LIDOCAINE HYDROCHLORIDE,EPINEPHRINE BITARTRATE 3 ML: 20; .005 INJECTION, SOLUTION EPIDURAL; INFILTRATION; INTRACAUDAL; PERINEURAL at 13:10

## 2023-12-26 RX ADMIN — HYDROMORPHONE HYDROCHLORIDE 0.2 MG: 1 INJECTION, SOLUTION INTRAMUSCULAR; INTRAVENOUS; SUBCUTANEOUS at 17:12

## 2023-12-26 RX ADMIN — ACETAMINOPHEN 975 MG: 325 TABLET ORAL at 20:57

## 2023-12-26 RX ADMIN — LIDOCAINE HYDROCHLORIDE,EPINEPHRINE BITARTRATE 2 ML: 20; .005 INJECTION, SOLUTION EPIDURAL; INFILTRATION; INTRACAUDAL; PERINEURAL at 13:25

## 2023-12-26 RX ADMIN — AZITHROMYCIN 500 MG: 500 INJECTION, POWDER, LYOPHILIZED, FOR SOLUTION INTRAVENOUS at 13:06

## 2023-12-26 RX ADMIN — MORPHINE SULFATE 3 MG: 0.5 INJECTION EPIDURAL; INTRATHECAL; INTRAVENOUS at 14:26

## 2023-12-26 RX ADMIN — DEXMEDETOMIDINE HYDROCHLORIDE 4 MCG: 100 INJECTION, SOLUTION INTRAVENOUS at 13:41

## 2023-12-26 RX ADMIN — FENTANYL CITRATE 100 MCG: 50 INJECTION, SOLUTION INTRAMUSCULAR; INTRAVENOUS at 13:10

## 2023-12-26 RX ADMIN — METHYLERGONOVINE MALEATE 0.2 MG: 0.2 INJECTION, SOLUTION INTRAMUSCULAR; INTRAVENOUS at 13:39

## 2023-12-26 RX ADMIN — DEXMEDETOMIDINE HYDROCHLORIDE 8 MCG: 100 INJECTION, SOLUTION INTRAVENOUS at 14:14

## 2023-12-26 RX ADMIN — BUPIVACAINE HYDROCHLORIDE IN DEXTROSE 1.2 ML: 7.5 INJECTION, SOLUTION SUBARACHNOID at 12:48

## 2023-12-26 RX ADMIN — ONDANSETRON 4 MG: 2 INJECTION, SOLUTION INTRAMUSCULAR; INTRAVENOUS at 12:30

## 2023-12-26 RX ADMIN — PROPOFOL 20 MG: 10 INJECTION, EMULSION INTRAVENOUS at 13:38

## 2023-12-26 RX ADMIN — METOCLOPRAMIDE 10 MG: 10 TABLET ORAL at 11:12

## 2023-12-26 RX ADMIN — ACETAMINOPHEN 975 MG: 325 TABLET ORAL at 11:09

## 2023-12-26 RX ADMIN — KETOROLAC TROMETHAMINE 30 MG: 30 INJECTION, SOLUTION INTRAMUSCULAR at 14:28

## 2023-12-26 RX ADMIN — DEXMEDETOMIDINE HYDROCHLORIDE 16 MCG: 100 INJECTION, SOLUTION INTRAVENOUS at 13:28

## 2023-12-26 RX ADMIN — FAMOTIDINE 20 MG: 10 INJECTION, SOLUTION INTRAVENOUS at 12:30

## 2023-12-26 RX ADMIN — DEXMEDETOMIDINE HYDROCHLORIDE 4 MCG: 100 INJECTION, SOLUTION INTRAVENOUS at 13:44

## 2023-12-26 RX ADMIN — CYCLOBENZAPRINE 10 MG: 10 TABLET, FILM COATED ORAL at 11:12

## 2023-12-26 SDOH — SOCIAL STABILITY: SOCIAL INSECURITY: HAVE YOU HAD THOUGHTS OF HARMING ANYONE ELSE?: NO

## 2023-12-26 SDOH — SOCIAL STABILITY: SOCIAL INSECURITY: ABUSE SCREEN: ADULT

## 2023-12-26 SDOH — HEALTH STABILITY: MENTAL HEALTH: SUICIDAL BEHAVIOR (LIFETIME): NO

## 2023-12-26 SDOH — HEALTH STABILITY: MENTAL HEALTH: WISH TO BE DEAD (PAST 1 MONTH): NO

## 2023-12-26 SDOH — HEALTH STABILITY: MENTAL HEALTH: HAVE YOU USED ANY SUBSTANCES (CANABIS, COCAINE, HEROIN, HALLUCINOGENS, INHALANTS, ETC.) IN THE PAST 12 MONTHS?: NO

## 2023-12-26 SDOH — HEALTH STABILITY: MENTAL HEALTH: CURRENT SMOKER: 0

## 2023-12-26 SDOH — SOCIAL STABILITY: SOCIAL INSECURITY: HAS ANYONE EVER THREATENED TO HURT YOUR FAMILY OR YOUR PETS?: NO

## 2023-12-26 SDOH — SOCIAL STABILITY: SOCIAL INSECURITY: ARE THERE ANY APPARENT SIGNS OF INJURIES/BEHAVIORS THAT COULD BE RELATED TO ABUSE/NEGLECT?: NO

## 2023-12-26 SDOH — SOCIAL STABILITY: SOCIAL INSECURITY: VERBAL ABUSE: DENIES

## 2023-12-26 SDOH — HEALTH STABILITY: MENTAL HEALTH: HAVE YOU USED ANY PRESCRIPTION DRUGS OTHER THAN PRESCRIBED IN THE PAST 12 MONTHS?: NO

## 2023-12-26 SDOH — SOCIAL STABILITY: SOCIAL INSECURITY: ARE YOU OR HAVE YOU BEEN THREATENED OR ABUSED PHYSICALLY, EMOTIONALLY, OR SEXUALLY BY ANYONE?: NO

## 2023-12-26 SDOH — SOCIAL STABILITY: SOCIAL INSECURITY: DOES ANYONE TRY TO KEEP YOU FROM HAVING/CONTACTING OTHER FRIENDS OR DOING THINGS OUTSIDE YOUR HOME?: NO

## 2023-12-26 SDOH — HEALTH STABILITY: MENTAL HEALTH: WERE YOU ABLE TO COMPLETE ALL THE BEHAVIORAL HEALTH SCREENINGS?: YES

## 2023-12-26 SDOH — SOCIAL STABILITY: SOCIAL INSECURITY: PHYSICAL ABUSE: DENIES

## 2023-12-26 SDOH — HEALTH STABILITY: MENTAL HEALTH: NON-SPECIFIC ACTIVE SUICIDAL THOUGHTS (PAST 1 MONTH): NO

## 2023-12-26 SDOH — SOCIAL STABILITY: SOCIAL INSECURITY: DO YOU FEEL ANYONE HAS EXPLOITED OR TAKEN ADVANTAGE OF YOU FINANCIALLY OR OF YOUR PERSONAL PROPERTY?: NO

## 2023-12-26 SDOH — ECONOMIC STABILITY: HOUSING INSECURITY: DO YOU FEEL UNSAFE GOING BACK TO THE PLACE WHERE YOU ARE LIVING?: NO

## 2023-12-26 ASSESSMENT — LIFESTYLE VARIABLES
AUDIT-C TOTAL SCORE: 0
AUDIT-C TOTAL SCORE: 0
SKIP TO QUESTIONS 9-10: 1
HOW OFTEN DO YOU HAVE A DRINK CONTAINING ALCOHOL: NEVER
HOW MANY STANDARD DRINKS CONTAINING ALCOHOL DO YOU HAVE ON A TYPICAL DAY: PATIENT DOES NOT DRINK
HOW OFTEN DO YOU HAVE 6 OR MORE DRINKS ON ONE OCCASION: NEVER

## 2023-12-26 ASSESSMENT — COLUMBIA-SUICIDE SEVERITY RATING SCALE - C-SSRS
1. IN THE PAST MONTH, HAVE YOU WISHED YOU WERE DEAD OR WISHED YOU COULD GO TO SLEEP AND NOT WAKE UP?: NO
6. HAVE YOU EVER DONE ANYTHING, STARTED TO DO ANYTHING, OR PREPARED TO DO ANYTHING TO END YOUR LIFE?: NO
2. HAVE YOU ACTUALLY HAD ANY THOUGHTS OF KILLING YOURSELF?: NO

## 2023-12-26 ASSESSMENT — PATIENT HEALTH QUESTIONNAIRE - PHQ9
2. FEELING DOWN, DEPRESSED OR HOPELESS: NOT AT ALL
1. LITTLE INTEREST OR PLEASURE IN DOING THINGS: NOT AT ALL
SUM OF ALL RESPONSES TO PHQ9 QUESTIONS 1 & 2: 0

## 2023-12-26 ASSESSMENT — PAIN SCALES - GENERAL
PAINLEVEL_OUTOF10: 5 - MODERATE PAIN
PAINLEVEL_OUTOF10: 7

## 2023-12-26 NOTE — ANESTHESIA PREPROCEDURE EVALUATION
Patient: Alba Mendez    Evaluation Method: In-person visit    Procedure Information    Date: 23  Procedure: Procedure Not Yet Scheduled         Relevant Problems   Cardiovascular   (+) Pre-eclampsia      Endocrine   (+) Gestational diabetes      Neuro/Psych   (+) Anxiety, generalized   (+) Depression   (+) PTSD (post-traumatic stress disorder)   (+) Postpartum depression      Hematology   (+) Anemia, antepartum       Social History     Tobacco Use   Smoking Status Never   Smokeless Tobacco Never      Social History     Substance and Sexual Activity   Alcohol Use Never      Social History     Substance and Sexual Activity   Drug Use Never       NPO Detail:  NPO/Void Status  Date of Last Liquid: 23 (2am)  Date of Last Solid: 23 (2am)         OB/Gyn Evaluation    Present Pregnancy    Patient is pregnant now.  (+) , previous  section, gestational diabetes   Obstetric History    (+)  section, hypertensive disorder of pregnancy - preeclampsia              Physical Exam    Airway  Mallampati: III  TM distance: >3 FB  Neck ROM: full     Cardiovascular   Rhythm: regular  Rate: normal     Dental - normal exam     Pulmonary   Breath sounds clear to auscultation     Abdominal   Abdomen: soft             Anesthesia Plan    ASA 2     CSE     The patient is not a current smoker.    Anesthetic plan and risks discussed with patient.  Use of blood products discussed with patient who.    Plan discussed with attending.

## 2023-12-26 NOTE — ED TRIAGE NOTES
Pt presnets  to ED for contractions. Pt is 37 wks pregnant with a due date of Jan 6th. Pt has 2 children at home with this being her 4th pregnancy. PT denies any other symptoms besides a headache. Pt is Aox3

## 2023-12-26 NOTE — DISCHARGE INSTRUCTIONS

## 2023-12-26 NOTE — H&P
Obstetrical Admission History and Physical     Alba Mendez is a 33 y.o. . 37w4d seen in TRIAGE.     Chief Complaint: Contractions    Assessment/Plan      Contractions, rCS  - Cervix: /-3   - TOCO: painful contractions q6-7 minutes, palpate moderate   - Tachycardic to low 100s, with contractions   - CBC, T&S, Syphilis screen ordered   - 1u RBC prepared   - S.010   - Flexeril dose given   - Repeat C/S  - Admitted, consented per Dr. Boston   - Patient counseled on risks of  section including, bleeding, infection, injury to bowel, bladder, pelvic vasculature, pelvic nerves, fallopian tubes, and ovaries.   - Establish IV access and epidural per Anesthesia   - T&C x1 unit, CBC, RPR on admission  - Plan for routine post operative care after procedure    H/o PEC in previous pregnancy   - Headache, resolved with tylenol/reglan   - 1 mild range BP    - Normotensive in triage  - No other PEC sx     GDMA2  - POCT glucose: 105   - Last ate at 0200   - NPH 16/20u, did not take morning dose   - 12/15 EFW: 92%, AC 96%    IUP at 37w4d  - PN labs reviewed WNL  - Rubella Non-immune   - NST reactive  - Good fetal movement  - GBS negative   - Scanned breech per SHAN Santana CNP     Maternal Well-being  - VSS  - All questions and concerns addressed    Dispo:  - Admission for rCS  - NPO since 0200   - Report to Dr. Madison for continuation of care    Discussed with Dr. Boston, tracing reviewed.   Tamara Beasley PA-C      Active Problems:  There are no active Hospital Problems.      Pregnancy Problems (from 23 to present)       Problem Noted Resolved    Rubella non-immune status, antepartum 2023 by Tamara Beasley PA-C No    Priority:  Medium      Excessive fetal growth affecting management of pregnancy in third trimester 12/15/2023 by TOR Bravo No    Priority:  Medium      Overview Signed 12/15/2023  5:10 PM by TOR Bravo     -Already planning   -Increased  interval growth on US today, prior AGA fetus  -Delivery planned via  38 wks          Anxiety in pregnancy, antepartum 2023 by Rosie Parekh No    Priority:  Medium      Gestational diabetes 2023 by Rosie Parekh No    Priority:  Medium      Overview Addendum 12/15/2023  5:09 PM by Melisa French APRN-CNP     -Shared Care with Dr. Lara  - Attended [place]  - MF Consult completed  -MFM 36 wk visit (12/15)  -Serial growth ultrasounds starting at 28 weeks    Last ultrasound: 12/15- EFW 92%, AC 96%, BPP 8/8, normal ELINA    Fetal surveillance:  -Weekly at 32 weeks  -Twice weekly at 36 weeks    Current Regimen: NPH 16/--> NPH 16/20* (12/15)-- with review of BG log 3/7 fastings were elevated or borderline.     Delivery Plan: 38wks (She is already scheduled for - 38.6wga with Dr. Lara)  Intrapartum:  GDM protocol  Postpartum: No medication    Recommend PP 2hr gtt and q3yr F/U with PCP for A1C and TSH           History of  delivery, currently pregnant in first trimester 2023 by Rosie Parekh No    Priority:  Medium      PTSD (post-traumatic stress disorder) 2023 by Rosie Parekh No    Priority:  Medium      Panic attacks 2023 by Rosie Parekh No    Priority:  Medium      Postpartum depression 2023 by Rosie Parekh No    Priority:  Medium      Anemia, antepartum 2023 by Rosie Parekh No    Priority:  Medium      H/O gestational diabetes in prior pregnancy, currently pregnant 2023 by Rosie Parekh No    Priority:  Medium      Hypertension 2023 by Rosie Parekh No    Priority:  Medium      Major depressive disorder in remission (CMS/HCC) 2023 by Rosie Parekh No    Priority:  Medium      Previous  section complicating pregnancy 2023 by Candi Lara MD No             Subjective   Alba presents with q7-8min min contractions that began overnight. She states contractions became more intense and painful into this morning  prompting her to come in, with pain radiating to her lower back. She denies LOF, bleeding, and has good fetal movement. She notes an episode of increased mucus discharge when she arrived in triage. Patient reports h/o PEC in previous pregnancy and 1 elevated BP on  but denies hx of HDP. She also endorses a HA but denies changes in vision, RUQ pain, chest pain, SOB. She states she had to take BP medication post-partum following previous delivery but did not take anything during pregnancy and was not diagnosed with cHTN. She has not taken any medication today including her morning NPH dose. She last ate  a small slice of cake at 0200. She states her BG has been 90s fasting and 140s after eating the past week.        Obstetrical History   OB History    Para Term  AB Living   4 2 1 1 1 2   SAB IAB Ectopic Multiple Live Births   1       2      # Outcome Date GA Lbr Danyel/2nd Weight Sex Delivery Anes PTL Lv   4 Current            3 Term 18 39w5d  3459 g F CS-Unspec EPI  SANDI      Complications: Failure to Progress in Second Stage, Failure to progress in labor   2 SAB  16w0d   F    FD   1   25w0d  567 g F Vag-Spont  Y SANDI      Obstetric Comments   Blood Pressure Kit Device - Pt to take Blood pressure at least once a day 2023   Unspecified Medication - blood pressire cuff 2023   Aspirin 81 MG Oral Tablet Chewable - CHEW 1 TABLET BY MOUTH EVERY DAY 2023   Ferrous Sulfate 325 (65 Fe) MG Oral Tablet - TAKE 1 TABLET BY MOUTH EVERY DAY AS DIRECTED 2023   Prenatal Vitamins 28-0.8 MG Oral Tablet - TAKE 1 TABLET BY MOUTH EVERY DAY 2023       Past Medical History  Past Medical History:   Diagnosis Date    Personal history of gestational diabetes     History of gestational diabetes mellitus (GDM)    Personal history of other complications of pregnancy, childbirth and the puerperium     History of pre-eclampsia    Personal history of other diseases of the respiratory  system     History of asthma        Past Surgical History   Past Surgical History:   Procedure Laterality Date    OTHER SURGICAL HISTORY  2019    Breast surgery    OTHER SURGICAL HISTORY  2019     section    OTHER SURGICAL HISTORY  2019    Oral surgery       Social History  Social History     Tobacco Use    Smoking status: Never    Smokeless tobacco: Never   Substance Use Topics    Alcohol use: Never     Substance and Sexual Activity   Drug Use Never       Allergies  Bee pollen, Ragweed, and Tree and shrub pollen     Medications  No medications prior to admission.       Objective    Last Vitals  Temp Pulse Resp BP MAP O2 Sat   36.6 °C (97.9 °F) 103 18 117/75   98 %     Physical Examination  GENERAL: Examination reveals a well developed, well nourished, gravid female in no acute distress. She is alert and cooperative. Appears uncomfortable with contractions.   ABDOMEN: soft, gravid, nontender, nondistended, no abnormal masses, no epigastric pain; contractions moderate to palpate  HEART: No murmurs, rubs or gallops noted; Normal S1 and S2, tachycardic   LUNGS: Breathing comfortably on RA; LCTAB  FHR is 145 , with moderate variability, Accelerations, no decelerations and a cat I  tracing.    Coram reading:  q6-7 minutes   CERVIX: 1  cm dilated, 70  % effaced, -3 station  NEUROLOGICAL: alert, oriented, normal speech, no focal findings or movement disorder noted      Lab Review  Lab Results   Component Value Date    WBC 5.2 2023    HGB 11.0 (L) 2023    HCT 33.4 (L) 2023     2023

## 2023-12-26 NOTE — ANESTHESIA PROCEDURE NOTES
CSE Block    Patient location during procedure: OR  Start time: 12/26/2023 12:35 PM  End time: 12/26/2023 12:51 PM  Reason for block: primary anesthetic}  Staffing  Performed: resident   Authorized by: JAMIE Quach    Performed by: JAMIE Quach    Preanesthetic Checklist  Completed: patient identified, IV checked, risks and benefits discussed, surgical consent, monitors and equipment checked, pre-op evaluation, timeout performed and sterile techniques followed  Block Timeout  RN/Licensed healthcare professional reads aloud to the Anesthesia provider and entire team: Patient identity, procedure with side and site, patient position, and as applicable the availability of implants/special equipment/special requirements.  Patient on coagulant treatment: no  Timeout performed at: 12/26/2023 12:41 PM    CSE Block  Patient position: sitting  Prep: ChloraPrep  Sterility prep: cap, gloves, hand, mask and drape  Sedation level: no sedation  Patient monitoring: blood pressure, continuous pulse oximetry and heart rate  Approach: midline  Local numbing: lidocaine 1% to skin and subcutaneous tissues  Vertebral space: lumbar  L3-4  Guidance: landmark technique    Epidural Needle  EDUARDO technique: saline  Needle type: Tuohy   Needle gauge: 17 G  Needle length: 8.9 cm  Needle insertion depth: 5.5 cm  Spinal Needle  Needle type: pencil-point   Needle gauge: 25 G  Needle length: 12.7 cm  Free flow CSF: yes  Epidural Catheter  Catheter type: multi-orifice  Catheter size: 19 G  Catheter at skin depth: 10.5 cm  Catheter securement method: liquid medical adhesive and clear occlusive dressing              Assessment  Procedure assessment: patient tolerated procedure well with no immediate complications  Additional Notes  CSE by PRAFUL Hussein

## 2023-12-26 NOTE — ANESTHESIA POSTPROCEDURE EVALUATION
Patient: Alba Mendez    Procedure Summary       Date: 12/26/23 Room / Location:     Anesthesia Start: 1232 Anesthesia Stop: 1450    Procedure: Procedure Not Yet Scheduled Diagnosis:     Scheduled Providers:  Responsible Provider: Jason Modi DO    Anesthesia Type: CSE ASA Status: 2            Anesthesia Type: CSE    Vitals Value Taken Time   /73 12/26/23 1448   Temp 36 12/26/23 1450   Pulse 85 12/26/23 1448   Resp 16 12/26/23 1450   SpO2 96 % 12/26/23 1448       Anesthesia Post Evaluation    Patient location during evaluation: bedside  Patient participation: complete - patient participated  Level of consciousness: awake  Pain management: adequate  Airway patency: patent  Cardiovascular status: acceptable  Respiratory status: acceptable  Hydration status: acceptable  Postoperative Nausea and Vomiting: none        No notable events documented.

## 2023-12-26 NOTE — L&D DELIVERY NOTE
OB Delivery Note  2023  Alba Mendez  33 y.o.   , Low Transverse      Dictation: Pre op diagnosis: 32yo  at 37.4wga presenting for contractions, found to be in labor with fetal breech malpresentation     Post op diagnosis: Same    Findings: Moderate adhesive disease between subcutaneous tissue and fascia. Moderate adhesive disease between fascia and rectus muscles. Minimal adhesive disease between peritoneum, muscles and fascia. Normal appearing gravid uterus, fallopian tubes, and ovaries. Amniotic fluid clear, male infant in breech malpresentation.     Procedure: Patient was taken to the OR where combined spinal epidural anesthesia was administered.  She was then placed in the dorsal supine position with a left lateral tilt. A barber catheter was placed, SCDs were applied, and a vaginal prep was performed. A pre-procedure time out was performed.  The patient was given prophylactic dose of IV antibiotics. She was then prepped and draped in the usual sterile fashion. A Pfannenstiel skin incision was made through the skin with the scalpel at the site of the previous incision and then carried through the subcutaneous fat to the underlying fascial layer with sharp dissection and electrosurgery. The fascia was incised on either side of the midline with the scalpel, and fascia was then dissected off the rectus muscle bilaterally using electrosurgery with clear visualization inferiorly using Michelle clamps. The superior aspect of the incision was grasped, tented up with Kocher clamps and the rectus muscle was dissected off bluntly. The muscles were divided in the midline, the peritoneum was identified and then entered bluntly, and incision extended superiorly and inferiorly with good visualization of bladder below. Bladder blade was inserted.     A low transverse uterine incision was made with the scalpel, the uterine cavity was entered, and the hysterotomy was extended bilaterally with  cephalocaudal stretching. The infant was delivered atraumatically using standard breech maneuvers, the cord was clamped and cut, and infant was handed off to the awaiting nursing staff. A cord segment and blood sample were collected. The placenta was then expressed. Uterine atony was noted, for which methergine, TXA and buccal cytotec were administered. The uterus was exteriorized and cleared of all clot and debris. The uterine incision was repaired using a running stitch of 0-Vicryl. Small bleeders were electrocauterized with the Bovie. Figures of eight were placed along the hysterotomy and Karina was applied. Hemostasis was noted.     The uterus was then placed back inside the pelvis, the gutters were cleared of all clots and debris. The hysterotomy was again evaluated and found to be hemostatic, and the underside of the fascia and bladder and the rectus muscles were also found to be hemostatic. The fascia was closed using a running stitch of 0-Vicryl. The subcutaneous layer was irrigated, small bleeding vessels were cauterized, and the subcutaneous layer was reapproximated using a running stitch of 2-0 Monocryl. The skin was closed with 4-0 Monocryl. All counts were correct, the patient tolerated the procedure well. Dr. Lara was present for all key portions of the procedure. The patient and infant were taken back to the recovery room in stable condition.     Gestational Age: 37w4d  /Para:   Quantitative Blood Loss: Admission to Discharge: 327 mL (2023  9:08 AM - 2023  4:04 PM)    Vargas Mendez [[id number]]      Labor Events    Sac identifier: Sac 1  Rupture date/time: 2023  Rupture type: Artificial  Fluid color: Clear  Fluid odor: None  Labor type:  Without Labor  Labor allowed to proceed with plans for an attempted vaginal birth?: No  Complications: None       Placenta    Placenta delivery date/time: 2023  Placenta removal: Manual  removal  Placenta appearance: Intact  Placenta disposition: pathology       Cord    Vessels: 3 vessels  Complications: None  Delayed cord clamping?: Yes  Cord clamped date/time: 2023 1331  Cord blood disposition: Lab  Gases sent?: No  Stem cell collection (by provider): No       Lacerations    Episiotomy: None  Perineal laceration: None  Other lacerations?: No  Repair suture: None       Anesthesia    Method: Spinal, Epidural       Operative Delivery    Forceps attempted?: No  Vacuum extractor attempted?: No       Shoulder Dystocia    Shoulder dystocia present?: No        Delivery    Time head delivered: 2023 13:31:00  Birth date/time: 2023 13:31:00  Delivery type: , Low Transverse   categorization: repeat   priority: routine  Indications for : Breech  Incision type: low transverse  Complications: None       Resuscitation    Method: Suctioning, Tactile stimulation       Apgars    Living status: Living  Apgar Component Scores:  1 min.:  5 min.:  10 min.:  15 min.:  20 min.:    Skin color:  0  1       Heart rate:  2  2       Reflex irritability:  2  2       Muscle tone:  2  2       Respiratory effort:  2  2       Total:  8  9       Apgars assigned by: WALLACE ROSARIO RN       Delivery Providers    Delivering clinician:    Provider Role     Delivery Nurse     Nursery Nurse     Resident                 Chata Granados MD

## 2023-12-27 PROBLEM — O36.63X0 EXCESSIVE FETAL GROWTH AFFECTING MANAGEMENT OF PREGNANCY IN THIRD TRIMESTER (HHS-HCC): Status: RESOLVED | Noted: 2023-12-15 | Resolved: 2023-12-27

## 2023-12-27 PROBLEM — O99.019 ANEMIA, ANTEPARTUM (HHS-HCC): Status: RESOLVED | Noted: 2023-08-30 | Resolved: 2023-12-27

## 2023-12-27 PROBLEM — R45.4 IRRITABILITY AND ANGER: Status: RESOLVED | Noted: 2023-08-30 | Resolved: 2023-12-27

## 2023-12-27 PROBLEM — N88.2 CERVICAL STENOSIS (UTERINE CERVIX): Status: RESOLVED | Noted: 2023-08-30 | Resolved: 2023-12-27

## 2023-12-27 PROBLEM — N63.0 BREAST LUMP OR MASS: Status: RESOLVED | Noted: 2023-08-30 | Resolved: 2023-12-27

## 2023-12-27 PROBLEM — F41.0 PANIC ATTACKS: Status: RESOLVED | Noted: 2023-08-30 | Resolved: 2023-12-27

## 2023-12-27 PROBLEM — O99.810 ABNORMAL GLUCOSE TOLERANCE IN PREGNANCY (HHS-HCC): Status: RESOLVED | Noted: 2023-08-30 | Resolved: 2023-12-27

## 2023-12-27 PROBLEM — T83.32XA IUD MIGRATION: Status: RESOLVED | Noted: 2023-08-30 | Resolved: 2023-12-27

## 2023-12-27 PROBLEM — M54.9 BACK PAIN: Status: RESOLVED | Noted: 2023-08-30 | Resolved: 2023-12-27

## 2023-12-27 PROBLEM — O09.891 HISTORY OF PRETERM DELIVERY, CURRENTLY PREGNANT IN FIRST TRIMESTER (HHS-HCC): Status: RESOLVED | Noted: 2023-08-30 | Resolved: 2023-12-27

## 2023-12-27 PROBLEM — F51.5 NIGHTMARES: Status: RESOLVED | Noted: 2023-08-30 | Resolved: 2023-12-27

## 2023-12-27 PROBLEM — G47.9 SLEEP DIFFICULTIES: Status: RESOLVED | Noted: 2023-08-30 | Resolved: 2023-12-27

## 2023-12-27 PROBLEM — O24.419 GESTATIONAL DIABETES (HHS-HCC): Status: RESOLVED | Noted: 2023-08-30 | Resolved: 2023-12-27

## 2023-12-27 PROBLEM — L60.3 NAIL BRITTLENESS: Status: RESOLVED | Noted: 2023-08-30 | Resolved: 2023-12-27

## 2023-12-27 PROBLEM — N76.0 VAGINITIS: Status: RESOLVED | Noted: 2023-08-30 | Resolved: 2023-12-27

## 2023-12-27 PROBLEM — M54.30 SCIATIC PAIN: Status: RESOLVED | Noted: 2023-08-30 | Resolved: 2023-12-27

## 2023-12-27 PROBLEM — K64.8 INTERNAL HEMORRHOIDS WITH COMPLICATION: Status: RESOLVED | Noted: 2023-08-30 | Resolved: 2023-12-27

## 2023-12-27 PROBLEM — R10.2 PELVIC PAIN IN FEMALE: Status: RESOLVED | Noted: 2023-08-30 | Resolved: 2023-12-27

## 2023-12-27 PROBLEM — O14.90 PRE-ECLAMPSIA (HHS-HCC): Status: RESOLVED | Noted: 2023-08-30 | Resolved: 2023-12-27

## 2023-12-27 PROBLEM — E55.9 VITAMIN D DEFICIENCY: Status: RESOLVED | Noted: 2023-08-30 | Resolved: 2023-12-27

## 2023-12-27 PROBLEM — S33.5XXA SPRAIN, LOW BACK: Status: RESOLVED | Noted: 2023-08-30 | Resolved: 2023-12-27

## 2023-12-27 PROBLEM — O34.219 PREVIOUS CESAREAN SECTION COMPLICATING PREGNANCY (HHS-HCC): Status: RESOLVED | Noted: 2023-12-04 | Resolved: 2023-12-27

## 2023-12-27 PROBLEM — R19.7 FREQUENT LOOSE STOOLS: Status: RESOLVED | Noted: 2023-08-30 | Resolved: 2023-12-27

## 2023-12-27 PROBLEM — O09.299 H/O GESTATIONAL DIABETES IN PRIOR PREGNANCY, CURRENTLY PREGNANT (HHS-HCC): Status: RESOLVED | Noted: 2023-08-30 | Resolved: 2023-12-27

## 2023-12-27 PROBLEM — Z86.32 H/O GESTATIONAL DIABETES IN PRIOR PREGNANCY, CURRENTLY PREGNANT (HHS-HCC): Status: RESOLVED | Noted: 2023-08-30 | Resolved: 2023-12-27

## 2023-12-27 PROBLEM — K62.5 RECTAL BLEEDING: Status: RESOLVED | Noted: 2023-08-30 | Resolved: 2023-12-27

## 2023-12-27 PROBLEM — K52.9 CHRONIC DIARRHEA: Status: RESOLVED | Noted: 2023-08-30 | Resolved: 2023-12-27

## 2023-12-27 PROBLEM — J06.9 UPPER RESPIRATORY INFECTION: Status: RESOLVED | Noted: 2023-08-30 | Resolved: 2023-12-27

## 2023-12-27 PROBLEM — K58.2 MIXED IRRITABLE BOWEL SYNDROME: Status: RESOLVED | Noted: 2023-08-30 | Resolved: 2023-12-27

## 2023-12-27 LAB
ERYTHROCYTE [DISTWIDTH] IN BLOOD BY AUTOMATED COUNT: 12.7 % (ref 11.5–14.5)
HCT VFR BLD AUTO: 26.1 % (ref 36–46)
HGB BLD-MCNC: 8.8 G/DL (ref 12–16)
MCH RBC QN AUTO: 30 PG (ref 26–34)
MCHC RBC AUTO-ENTMCNC: 33.7 G/DL (ref 32–36)
MCV RBC AUTO: 89 FL (ref 80–100)
NRBC BLD-RTO: 0 /100 WBCS (ref 0–0)
PLATELET # BLD AUTO: 159 X10*3/UL (ref 150–450)
RBC # BLD AUTO: 2.93 X10*6/UL (ref 4–5.2)
WBC # BLD AUTO: 8.8 X10*3/UL (ref 4.4–11.3)

## 2023-12-27 PROCEDURE — 2500000004 HC RX 250 GENERAL PHARMACY W/ HCPCS (ALT 636 FOR OP/ED)

## 2023-12-27 PROCEDURE — 96372 THER/PROPH/DIAG INJ SC/IM: CPT

## 2023-12-27 PROCEDURE — 2500000001 HC RX 250 WO HCPCS SELF ADMINISTERED DRUGS (ALT 637 FOR MEDICARE OP)

## 2023-12-27 PROCEDURE — 85027 COMPLETE CBC AUTOMATED: CPT

## 2023-12-27 PROCEDURE — 36415 COLL VENOUS BLD VENIPUNCTURE: CPT

## 2023-12-27 PROCEDURE — 1100000001 HC PRIVATE ROOM DAILY

## 2023-12-27 RX ADMIN — OXYCODONE HYDROCHLORIDE 10 MG: 5 TABLET ORAL at 19:54

## 2023-12-27 RX ADMIN — KETOROLAC TROMETHAMINE 30 MG: 30 INJECTION, SOLUTION INTRAMUSCULAR; INTRAVENOUS at 08:25

## 2023-12-27 RX ADMIN — IBUPROFEN 600 MG: 600 TABLET, FILM COATED ORAL at 15:09

## 2023-12-27 RX ADMIN — IBUPROFEN 600 MG: 600 TABLET, FILM COATED ORAL at 21:04

## 2023-12-27 RX ADMIN — ENOXAPARIN SODIUM 40 MG: 100 INJECTION SUBCUTANEOUS at 08:25

## 2023-12-27 RX ADMIN — ACETAMINOPHEN 975 MG: 325 TABLET ORAL at 08:25

## 2023-12-27 RX ADMIN — ACETAMINOPHEN 975 MG: 325 TABLET ORAL at 21:04

## 2023-12-27 RX ADMIN — ACETAMINOPHEN 975 MG: 325 TABLET ORAL at 15:09

## 2023-12-27 RX ADMIN — HYDROMORPHONE HYDROCHLORIDE 0.2 MG: 1 INJECTION, SOLUTION INTRAMUSCULAR; INTRAVENOUS; SUBCUTANEOUS at 12:49

## 2023-12-27 RX ADMIN — OXYCODONE HYDROCHLORIDE 10 MG: 5 TABLET ORAL at 23:59

## 2023-12-27 RX ADMIN — ACETAMINOPHEN 975 MG: 325 TABLET ORAL at 02:58

## 2023-12-27 RX ADMIN — KETOROLAC TROMETHAMINE 30 MG: 30 INJECTION, SOLUTION INTRAMUSCULAR; INTRAVENOUS at 02:58

## 2023-12-27 ASSESSMENT — PAIN SCALES - GENERAL
PAINLEVEL_OUTOF10: 5 - MODERATE PAIN
PAINLEVEL_OUTOF10: 9
PAINLEVEL_OUTOF10: 6
PAINLEVEL_OUTOF10: 6
PAINLEVEL_OUTOF10: 7
PAINLEVEL_OUTOF10: 9
PAINLEVEL_OUTOF10: 4

## 2023-12-27 ASSESSMENT — PAIN DESCRIPTION - ORIENTATION: ORIENTATION: LOWER

## 2023-12-27 ASSESSMENT — PAIN - FUNCTIONAL ASSESSMENT
PAIN_FUNCTIONAL_ASSESSMENT: 0-10

## 2023-12-27 ASSESSMENT — PAIN DESCRIPTION - DESCRIPTORS
DESCRIPTORS: DISCOMFORT
DESCRIPTORS: DISCOMFORT;SORE

## 2023-12-27 ASSESSMENT — PAIN DESCRIPTION - LOCATION
LOCATION: BACK
LOCATION: ABDOMEN

## 2023-12-27 NOTE — CARE PLAN
Problem: Fall/Injury  Goal: Not fall by end of shift  12/27/2023 1202 by Lina Rowe RN  Outcome: Progressing  12/27/2023 1115 by Lina Rowe RN  Outcome: Progressing  Goal: Be free from injury by end of the shift  12/27/2023 1202 by Lina Rowe RN  Outcome: Progressing  12/27/2023 1115 by Lina Rowe RN  Outcome: Progressing  Goal: Verbalize understanding of personal risk factors for fall in the hospital  12/27/2023 1202 by Lina Rowe RN  Outcome: Progressing  12/27/2023 1115 by Lina Rowe RN  Outcome: Progressing  Goal: Verbalize understanding of risk factor reduction measures to prevent injury from fall in the home  12/27/2023 1202 by Lina Rowe RN  Outcome: Progressing  12/27/2023 1115 by Lina Rowe RN  Outcome: Progressing  Goal: Use assistive devices by end of the shift  12/27/2023 1202 by Lina Rowe RN  Outcome: Progressing  12/27/2023 1115 by Lina Rowe RN  Outcome: Progressing  Goal: Pace activities to prevent fatigue by end of the shift  12/27/2023 1202 by Lina Rowe RN  Outcome: Progressing  12/27/2023 1115 by Lina Rowe RN  Outcome: Progressing

## 2023-12-27 NOTE — CARE PLAN
Problem: Fall/Injury  Goal: Not fall by end of shift  12/27/2023 1729 by Lina Rowe RN  Outcome: Progressing  12/27/2023 1202 by Lina Rowe RN  Outcome: Progressing  12/27/2023 1115 by Lina Rowe RN  Outcome: Progressing  Goal: Be free from injury by end of the shift  12/27/2023 1729 by Lina Rowe RN  Outcome: Progressing  12/27/2023 1202 by Lina Rowe RN  Outcome: Progressing  12/27/2023 1115 by Lina Rowe RN  Outcome: Progressing  Goal: Verbalize understanding of personal risk factors for fall in the hospital  12/27/2023 1729 by Lina Rowe RN  Outcome: Progressing  12/27/2023 1202 by Lina Rowe RN  Outcome: Progressing  12/27/2023 1115 by Lina Rowe RN  Outcome: Progressing  Goal: Verbalize understanding of risk factor reduction measures to prevent injury from fall in the home  12/27/2023 1729 by Lina Rowe RN  Outcome: Progressing  12/27/2023 1202 by Lina Rowe RN  Outcome: Progressing  12/27/2023 1115 by Lina Rowe RN  Outcome: Progressing  Goal: Use assistive devices by end of the shift  12/27/2023 1729 by Lina Rowe RN  Outcome: Progressing  12/27/2023 1202 by Lina Rowe RN  Outcome: Progressing  12/27/2023 1115 by Lina Rowe RN  Outcome: Progressing  Goal: Pace activities to prevent fatigue by end of the shift  12/27/2023 1729 by Lina Rowe RN  Outcome: Progressing  12/27/2023 1202 by Lina Rowe RN  Outcome: Progressing  12/27/2023 1115 by Lina Rowe RN  Outcome: Progressing

## 2023-12-27 NOTE — SIGNIFICANT EVENT
Patient meets criteria for home monitoring of blood pressure post discharge.  Met with patient to assess for availability of home BP monitor.  Patient does not have access to BP monitor at home.  Pt agreed to order home BP monitor from [id number] outdoors. Large BP monitor delivered to room.  Patient educated on how to use BP monitor, recording BP's on home monitoring log and s/sx to report to her provider.  Pt verbalized understanding the above information.

## 2023-12-28 ENCOUNTER — APPOINTMENT (OUTPATIENT)
Dept: OBSTETRICS AND GYNECOLOGY | Facility: CLINIC | Age: 33
End: 2023-12-28
Payer: COMMERCIAL

## 2023-12-28 PROCEDURE — 96372 THER/PROPH/DIAG INJ SC/IM: CPT

## 2023-12-28 PROCEDURE — 2500000001 HC RX 250 WO HCPCS SELF ADMINISTERED DRUGS (ALT 637 FOR MEDICARE OP)

## 2023-12-28 PROCEDURE — 2500000004 HC RX 250 GENERAL PHARMACY W/ HCPCS (ALT 636 FOR OP/ED)

## 2023-12-28 PROCEDURE — 1100000001 HC PRIVATE ROOM DAILY

## 2023-12-28 RX ADMIN — OXYCODONE HYDROCHLORIDE 10 MG: 5 TABLET ORAL at 05:17

## 2023-12-28 RX ADMIN — ACETAMINOPHEN 975 MG: 325 TABLET ORAL at 22:03

## 2023-12-28 RX ADMIN — OXYCODONE HYDROCHLORIDE 10 MG: 5 TABLET ORAL at 11:34

## 2023-12-28 RX ADMIN — ENOXAPARIN SODIUM 40 MG: 100 INJECTION SUBCUTANEOUS at 08:56

## 2023-12-28 RX ADMIN — ACETAMINOPHEN 975 MG: 325 TABLET ORAL at 08:54

## 2023-12-28 RX ADMIN — POLYETHYLENE GLYCOL 3350 17 G: 17 POWDER, FOR SOLUTION ORAL at 22:26

## 2023-12-28 RX ADMIN — IBUPROFEN 600 MG: 600 TABLET, FILM COATED ORAL at 03:04

## 2023-12-28 RX ADMIN — IBUPROFEN 600 MG: 600 TABLET, FILM COATED ORAL at 22:03

## 2023-12-28 RX ADMIN — ACETAMINOPHEN 975 MG: 325 TABLET ORAL at 03:04

## 2023-12-28 RX ADMIN — ACETAMINOPHEN 975 MG: 325 TABLET ORAL at 15:59

## 2023-12-28 RX ADMIN — IBUPROFEN 600 MG: 600 TABLET, FILM COATED ORAL at 15:59

## 2023-12-28 RX ADMIN — OXYCODONE HYDROCHLORIDE 10 MG: 5 TABLET ORAL at 17:23

## 2023-12-28 RX ADMIN — IBUPROFEN 600 MG: 600 TABLET, FILM COATED ORAL at 08:54

## 2023-12-28 ASSESSMENT — PAIN SCALES - GENERAL
PAINLEVEL_OUTOF10: 9
PAINLEVEL_OUTOF10: 4
PAINLEVEL_OUTOF10: 7
PAINLEVEL_OUTOF10: 9
PAINLEVEL_OUTOF10: 4
PAINLEVEL_OUTOF10: 4
PAINLEVEL_OUTOF10: 9

## 2023-12-28 ASSESSMENT — COGNITIVE AND FUNCTIONAL STATUS - GENERAL
DAILY ACTIVITIY SCORE: 23
MOBILITY SCORE: 24
DRESSING REGULAR LOWER BODY CLOTHING: A LITTLE

## 2023-12-28 ASSESSMENT — PAIN - FUNCTIONAL ASSESSMENT
PAIN_FUNCTIONAL_ASSESSMENT: 0-10
PAIN_FUNCTIONAL_ASSESSMENT: CPOT (CRITICAL CARE PAIN OBSERVATION TOOL)

## 2023-12-28 ASSESSMENT — PAIN DESCRIPTION - DESCRIPTORS
DESCRIPTORS: ACHING;SHARP
DESCRIPTORS: ACHING
DESCRIPTORS: ACHING;SHARP
DESCRIPTORS: ACHING;SHARP

## 2023-12-28 ASSESSMENT — PAIN DESCRIPTION - LOCATION
LOCATION: ABDOMEN

## 2023-12-28 NOTE — PROGRESS NOTES
Postpartum Progress Note    Assessment/Plan   Alba Mendez is a 33 y.o., , who delivered at 37w4d gestation and is now postpartum day 1.    S/p repeat  on 2023 for breech, in labor, A2 diabetes at 37.3 weeks.  Uncomplicated delivery of a boy weighing 7 pounds 10 ounces Apgars 8, 9.  Pain adequately controlled.  patient voiced no concerns.  Ambulating well.  Tolerating diet.  Meeting milestones.  Continue routine postop care.  Principal Problem:    Labor and delivery indication for care or intervention    Pregnancy Problems (from 23 to present)       Problem Noted Resolved    Rubella non-immune status, antepartum 2023 by Tamara Beasley PA-C No    Priority:  Medium      Excessive fetal growth affecting management of pregnancy in third trimester 12/15/2023 by SAMANTHA Bravo-CNP No    Priority:  Medium      Overview Signed 12/15/2023  5:10 PM by SAMANTHA Bravo-CNP     -Already planning   -Increased interval growth on US today, prior AGA fetus  -Delivery planned via  38 wks          Previous  section complicating pregnancy 2023 by Candi Lara MD No    Priority:  Medium      Anxiety in pregnancy, antepartum 2023 by Rosie Parekh No    Priority:  Medium      Gestational diabetes 2023 by Rosie Parekh No    Priority:  Medium      Overview Addendum 12/15/2023  5:09 PM by SAMANTHA Bravo-CNP     -Shared Care with Dr. Lara  - Attended [facility]  - [facility] Consult completed  -MFM 36 wk visit (12/15)  -Serial growth ultrasounds starting at 28 weeks    Last ultrasound: 12/15- EFW 92%, AC 96%, BPP 8/8, normal ELINA    Fetal surveillance:  -Weekly at 32 weeks  -Twice weekly at 36 weeks    Current Regimen: NPH 16/16--> NPH 16/20* (12/15)-- with review of BG log 3/7 fastings were elevated or borderline.     Delivery Plan: 38wks (She is already scheduled for - 38.6wga with Dr. Lara)  Intrapartum: [facility] GDM  protocol  Postpartum: No medication    Recommend PP 2hr gtt and q3yr F/U with PCP for A1C and TSH           History of  delivery, currently pregnant in first trimester 2023 by Rosie Parekh No    Priority:  Medium      PTSD (post-traumatic stress disorder) 2023 by Rosie Parekh No    Priority:  Medium      Panic attacks 2023 by Rosie Parekh No    Priority:  Medium      Postpartum depression 2023 by Rosie Parekh No    Priority:  Medium      Anemia, antepartum 2023 by Rosie Parekh No    Priority:  Medium      H/O gestational diabetes in prior pregnancy, currently pregnant 2023 by Rosie Parekh No    Priority:  Medium      Hypertension 2023 by Rosie Parekh No    Priority:  Medium      Major depressive disorder in remission (CMS/HCC) 2023 by Rosie Parekh No    Priority:  Medium            Hospital course: gestational diabetes       Subjective   Her pain is well controlled with current medications  She is passing flatus  She is ambulating well  She is tolerating a Adult diet Regular  She reports no breast or nursing problems  She denies emotional concerns today   Her plan for contraception is none     Minimal lochia.  No dysuria.  No nausea/ vomiting.  No headache, no vision changes, no right upper quadrant pain.  No edema.    Objective   Allergies:   Bee pollen, Ragweed, and Tree and shrub pollen         Last Vitals:  Temp Pulse Resp BP MAP Pulse Ox   36.6 °C (97.9 °F) 100 16 136/78   97 %     Vitals Min/Max Last 24 Hours:  Temp  Min: 36.1 °C (97 °F)  Max: 36.6 °C (97.9 °F)  Pulse  Min: 85  Max: 103  Resp  Min: 16  Max: 17  BP  Min: 110/70  Max: 136/78    Intake/Output:     Intake/Output Summary (Last 24 hours) at 2023  Last data filed at 2023 1230  Gross per 24 hour   Intake --   Output 1400 ml   Net -1400 ml       Physical Exam:  General: Examination reveals a well developed, well nourished, female, in no acute distress. She is alert and  "cooperative.  Constitutional: Alert and in no acute distress. Well developed, well nourished.   Head and Face: Head and face: Normal.    Eyes: Normal external exam - nonicteric sclera  Neck: No neck asymmetry.  Pulmonary: No respiratory distress.   Abdomen: Soft nontender; fundus firm at 2cm below umbilicus.  Incision is clean, dry, intact.  Musculoskeletal: No joint swelling seen, normal movements of all extremities.   Neurologic: Non-focal. Grossly intact.   Psychiatric: Alert and oriented x 3. Affect normal to patient baseline. Mood: Appropriate.  Lab Data:  Results for orders placed or performed during the hospital encounter of 12/26/23 (from the past 24 hour(s))   CBC   Result Value Ref Range    WBC 8.8 4.4 - 11.3 x10*3/uL    nRBC 0.0 0.0 - 0.0 /100 WBCs    RBC 2.93 (L) 4.00 - 5.20 x10*6/uL    Hemoglobin 8.8 (L) 12.0 - 16.0 g/dL    Hematocrit 26.1 (L) 36.0 - 46.0 %    MCV 89 80 - 100 fL    MCH 30.0 26.0 - 34.0 pg    MCHC 33.7 32.0 - 36.0 g/dL    RDW 12.7 11.5 - 14.5 %    Platelets 159 150 - 450 x10*3/uL      No results found for: \"ABO\", \"LABRH\", \"ABSCRN\"  "

## 2023-12-28 NOTE — PROGRESS NOTES
Social Work Progress Note     Patient Name: Alba Mendez    Name: Milagro   [place] : 23    Assessment: SW met with Ms. Mendez at bedside to introduce self, and SW role.  Ms. Mendez was friendly, and easy to engage in conversation.  She reports having the supplies needed to care for  including car seat, and safe sleep location.  Parent verbalized understanding of the ABC's of safe sleep.  She has two other children ages twelve and five.  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    Mother of baby endorses history of Depression, anxiety, and PTSD.  She received counseling services in the past, and prescribed Buspar.  Her last counseling session was in 2023 per chart review.  She's aware of signs/symptoms.  She plans to resume counseling services if/when needed.  Stable mood currently.  She declined PPD resources offered this visit.  No unmet needs identified this visit.    SW will continue to follow, and assist as needed    Plan: Ms. Mendez MR#[id number], and  boy MR#[id number] are clear from SW perspective      Signature: Annita BARRY BRIAN

## 2023-12-28 NOTE — CARE PLAN
The patient's goals for the shift include      The clinical goals for the shift include Maintain lochia as moderate or less and increase activity    Pt is a 32 yo  s/p c/s. VSS and assessments WNL. Pt w/ improving pain control throughout shift with both non-pharmacologic and pharmacologic methods. Pt using multiple doses of oxycodone throughout shift to maintain good pain ccontrol. Mother and infant boding well. Spouse or family at bedside throughout shift supportive of mom and baby. Mother and baby bonding well.

## 2023-12-29 ENCOUNTER — PHARMACY VISIT (OUTPATIENT)
Dept: PHARMACY | Facility: CLINIC | Age: 33
End: 2023-12-29
Payer: MEDICAID

## 2023-12-29 VITALS
OXYGEN SATURATION: 94 % | DIASTOLIC BLOOD PRESSURE: 76 MMHG | HEART RATE: 88 BPM | WEIGHT: 176 LBS | HEIGHT: 61 IN | BODY MASS INDEX: 33.23 KG/M2 | SYSTOLIC BLOOD PRESSURE: 116 MMHG | RESPIRATION RATE: 16 BRPM | TEMPERATURE: 97.9 F

## 2023-12-29 PROBLEM — O99.340 ANXIETY IN PREGNANCY, ANTEPARTUM (HHS-HCC): Status: RESOLVED | Noted: 2023-08-30 | Resolved: 2023-12-29

## 2023-12-29 PROBLEM — F41.9 ANXIETY IN PREGNANCY, ANTEPARTUM (HHS-HCC): Status: RESOLVED | Noted: 2023-08-30 | Resolved: 2023-12-29

## 2023-12-29 LAB
BLOOD EXPIRATION DATE: NORMAL
DISPENSE STATUS: NORMAL
PRODUCT BLOOD TYPE: 6200
PRODUCT CODE: NORMAL
UNIT ABO: NORMAL
UNIT NUMBER: NORMAL
UNIT RH: NORMAL
UNIT VOLUME: 350
XM INTEP: NORMAL

## 2023-12-29 PROCEDURE — 2500000001 HC RX 250 WO HCPCS SELF ADMINISTERED DRUGS (ALT 637 FOR MEDICARE OP)

## 2023-12-29 PROCEDURE — RXMED WILLOW AMBULATORY MEDICATION CHARGE

## 2023-12-29 PROCEDURE — 2500000004 HC RX 250 GENERAL PHARMACY W/ HCPCS (ALT 636 FOR OP/ED)

## 2023-12-29 PROCEDURE — 96372 THER/PROPH/DIAG INJ SC/IM: CPT

## 2023-12-29 RX ORDER — IBUPROFEN 600 MG/1
600 TABLET ORAL EVERY 6 HOURS
Qty: 60 TABLET | Refills: 2 | Status: SHIPPED | OUTPATIENT
Start: 2023-12-29

## 2023-12-29 RX ORDER — ACETAMINOPHEN 325 MG/1
975 TABLET ORAL EVERY 6 HOURS
Qty: 60 TABLET | Refills: 2 | Status: SHIPPED | OUTPATIENT
Start: 2023-12-29

## 2023-12-29 RX ORDER — POLYETHYLENE GLYCOL 3350 17 G/17G
17 POWDER, FOR SOLUTION ORAL 2 TIMES DAILY PRN
Qty: 10 PACKET | Refills: 1 | Status: SHIPPED | OUTPATIENT
Start: 2023-12-29

## 2023-12-29 RX ORDER — OXYCODONE HYDROCHLORIDE 5 MG/1
5 TABLET ORAL EVERY 4 HOURS PRN
Qty: 15 TABLET | Refills: 0 | Status: SHIPPED | OUTPATIENT
Start: 2023-12-29

## 2023-12-29 RX ADMIN — IBUPROFEN 600 MG: 600 TABLET, FILM COATED ORAL at 03:55

## 2023-12-29 RX ADMIN — ENOXAPARIN SODIUM 40 MG: 100 INJECTION SUBCUTANEOUS at 10:00

## 2023-12-29 RX ADMIN — OXYCODONE HYDROCHLORIDE 10 MG: 5 TABLET ORAL at 01:59

## 2023-12-29 RX ADMIN — ACETAMINOPHEN 975 MG: 325 TABLET ORAL at 09:59

## 2023-12-29 RX ADMIN — IBUPROFEN 600 MG: 600 TABLET, FILM COATED ORAL at 09:59

## 2023-12-29 RX ADMIN — ACETAMINOPHEN 975 MG: 325 TABLET ORAL at 03:55

## 2023-12-29 RX ADMIN — OXYCODONE HYDROCHLORIDE 10 MG: 5 TABLET ORAL at 12:59

## 2023-12-29 ASSESSMENT — PAIN - FUNCTIONAL ASSESSMENT: PAIN_FUNCTIONAL_ASSESSMENT: 0-10

## 2023-12-29 ASSESSMENT — PAIN SCALES - GENERAL
PAINLEVEL_OUTOF10: 6
PAINLEVEL_OUTOF10: 9

## 2023-12-29 ASSESSMENT — PAIN DESCRIPTION - DESCRIPTORS: DESCRIPTORS: DISCOMFORT

## 2023-12-29 ASSESSMENT — PAIN DESCRIPTION - LOCATION: LOCATION: ABDOMEN

## 2023-12-29 NOTE — PROGRESS NOTES
Postpartum Progress Note    Assessment/Plan   Alba Mendez is a 33 y.o., , who delivered at 37w4d gestation and is now postpartum day 3.    This note is a late entry.  I saw Ms. Mendez earlier on 23.  She had a repeat  for breech, in labor at 37.4 weeks for a boy (Milagro) weighing 7 pounds 10 ounces.  Apgars 8 and 9.  No complications.  Her antepartum course was complicated by A2 diabetes.  She has been feeling well.  Passing flatus, no dysuria.  Minimal lochia.  Meeting milestones.  Appropriate incisional pain managed with Tylenol, Motrin and oxycodone.  Plan home on postpartum day #3, 2023.    Active Problems:  There are no active Hospital Problems.    Pregnancy Problems (from 23 to present)       Problem Noted Resolved    Rubella non-immune status, antepartum 2023 by Tamara Beasley PA-C No    Priority:  Medium      Anxiety in pregnancy, antepartum 2023 by Rosie Parekh No    Priority:  Medium      PTSD (post-traumatic stress disorder) 2023 by Rosie Parekh No    Priority:  Medium      Hypertension 2023 by Rosie Parekh No    Priority:  Medium      Major depressive disorder in remission (CMS/HCC) 2023 by Rosie Parekh No    Priority:  Medium      Excessive fetal growth affecting management of pregnancy in third trimester 12/15/2023 by Melisa French APRN-CNP 2023 by Candi Lara MD    Priority:  Medium      Overview Signed 12/15/2023  5:10 PM by Melisa French APRN-CNP     -Already planning   -Increased interval growth on US today, prior AGA fetus  -Delivery planned via  38 wks          Previous  section complicating pregnancy 2023 by Candi Lara MD 2023 by Candi Lara MD    Priority:  Medium      Gestational diabetes 2023 by Rosie Parekh 2023 by Candi Lara MD    Priority:  Medium      Overview Addendum 12/15/2023  5:09 PM by Melisa French, APRN-CNP      -Shared Care with Dr. Lara  - Attended [facility]  - MFM Consult completed  -MFM 36 wk visit (12/15)  -Serial growth ultrasounds starting at 28 weeks    Last ultrasound: 12/15- EFW 92%, AC 96%, BPP 88, normal ELINA    Fetal surveillance:  -Weekly at 32 weeks  -Twice weekly at 36 weeks    Current Regimen: NPH 16/16--> NPH 16/20* (12/15)-- with review of BG log 3/ fastings were elevated or borderline.     Delivery Plan: 38wks (She is already scheduled for - 38.6wga with Dr. Lara)  Intrapartum:  GDM protocol  Postpartum: No medication    Recommend PP 2hr gtt and q3yr F/U with PCP for A1C and TSH           History of  delivery, currently pregnant in first trimester 2023 by Rosie Parekh 2023 by Candi Lara MD    Priority:  Medium      Panic attacks 2023 by Rosie Parekh 2023 by Candi Lara MD    Priority:  Medium      Postpartum depression 2023 by Rosie Parekh 2023 by Candi Lara MD    Priority:  Medium      Anemia, antepartum 2023 by Rosie Parekh 2023 by Candi Lara MD    Priority:  Medium      H/O gestational diabetes in prior pregnancy, currently pregnant 2023 by Rosie Parekh 2023 by Candi Lara MD    Priority:  Medium            Hospital course: no complications       Subjective   Her pain is well controlled with current medications  She is passing flatus  She is ambulating well  She is tolerating a Adult diet Regular  She reports no breast or nursing problems  She denies emotional concerns today   Her plan for contraception is none     Minimal lochia, no dysuria.  Tolerating p.o. well.  Passing flatus.  Ambulating well.    Objective   Allergies:   Bee pollen, Ragweed, and Tree and shrub pollen         Last Vitals:  Temp Pulse Resp BP MAP Pulse Ox   36.5 °C (97.7 °F) 99 16 119/74   95 %     Vitals Min/Max Last 24 Hours:  Temp  Min: 36.1 °C (97 °F)  Max: 36.5 °C (97.7 °F)  Pulse  Min: 94   Max: 99  Resp  Min: 16  Max: 16  BP  Min: 113/70  Max: 134/78    Intake/Output:     Intake/Output Summary (Last 24 hours) at 12/29/2023 0012  Last data filed at 12/28/2023 2200  Gross per 24 hour   Intake 250 ml   Output --   Net 250 ml       Physical Exam:  General: Examination reveals a well developed, well nourished, female, in no acute distress. She is alert and cooperative.  Constitutional: Alert and in no acute distress. Well developed, well nourished.   Head and Face: Head and face: Normal.    Eyes: Normal external exam - nonicteric sclera, extraocular movements intact (EOMI) and no ptosis.    Pulmonary: No respiratory distress.    Abdomen: Soft nontender; Incision C/D/I  Musculoskeletal: No joint swelling seen, normal movements of all extremities.   Skin: Normal skin color and pigmentation, normal skin turgor, and no rash.   Neurologic: Non-focal. Grossly intact.   Psychiatric: Alert and oriented x 3. Affect normal to patient baseline. Mood: Appropriate.  Lab Data:  Lab Results   Component Value Date    WBC 8.8 12/27/2023    HGB 8.8 (L) 12/27/2023    HCT 26.1 (L) 12/27/2023     12/27/2023

## 2023-12-29 NOTE — CARE PLAN
Problem: Fall/Injury  Goal: Not fall by end of shift  Outcome: Met  Goal: Be free from injury by end of the shift  Outcome: Met  Goal: Pace activities to prevent fatigue by end of the shift  Outcome: Met

## 2023-12-29 NOTE — CARE PLAN
The patient's goals for the shift include      The clinical goals for the shift include maintain normal BP      Problem: Fall/Injury  Goal: Verbalize understanding of personal risk factors for fall in the hospital  Outcome: Adequate for Discharge  Goal: Verbalize understanding of risk factor reduction measures to prevent injury from fall in the home  Outcome: Adequate for Discharge  Goal: Use assistive devices by end of the shift  Outcome: Adequate for Discharge

## 2023-12-29 NOTE — DISCHARGE SUMMARY
"Discharge Summary    Admission Date: 2023  Discharge Date: 23    Discharge Diagnosis  Labor and delivery indication for care or intervention    Hospital Course  Delivery Date: 2023  1:31 PM   Delivery type: , Low Transverse    GA at delivery: 37w4d  Outcome: Living   Anesthesia during delivery: Spinal;Epidural   Intrapartum complications: None   Feeding method: Breastfeeding Status: Yes     Procedures:  Repeat low-transverse  section performed on 2023 for breech, labor.  A boy delivered weighing 7 pounds 10 ounces.  Apgars 8 and 9.  No complications.  Contraception at discharge: none  N/a    Pertinent Physical Exam At Time of Discharge  GENERAL: Examination reveals a well developed, well nourished, gravid female in no acute distress. She is alert and cooperative.  General: Examination reveals a well developed, well nourished, female, in no acute distress. She is alert and cooperative.  Incision is healing well, clean, dry, intact.  Discharge Meds     Your medication list        CONTINUE taking these medications        Instructions Last Dose Given Next Dose Due   BD Cuca 2nd Gen Pen Needle 32 gauge x /\" needle  Generic drug: pen needle, diabetic      Use 1 needle with each injection.       Easy Touch Alcohol Prep Pads pads, medicated  Generic drug: alcohol swabs      Use 1 swab as directed up to 5 times a day.       OneTouch Delica Plus Lancet 33 gauge misc  Generic drug: lancets                  ASK your doctor about these medications        Instructions Last Dose Given Next Dose Due   acetaminophen 325 mg tablet  Commonly known as: Tylenol           ergocalciferol 1.25 MG (78197 UT) capsule  Commonly known as: Vitamin D-2           NovoLIN N FlexPen 100 unit/mL (3 mL) injection  Generic drug: insulin NPH (Isophane)      Inject 10 Units under the skin 2 times a day (before breakfast and before bedtime).       OneTouch Verio test strips strip  Generic drug: blood " sugar diagnostic           Prenatal 27 mg iron-800 mcg folic acid tablet  Generic drug: prenatal vitamin (iron-folic)           PRENATAL ORAL                     Complications Requiring Follow-Up  none    Test Results Pending At Discharge  Pending Labs       No current pending labs.            Outpatient Follow-Up  No future appointments.    I spent 40 minutes in the professional and overall care of this patient.      Candi Lara MD

## 2023-12-30 NOTE — SIGNIFICANT EVENT
Follow-up Phone Call Note:   Interview:  Care Type: Women's Health   Phone Number Call  [phone]   Call Outcome: Connected with patient   Patient Reports Feeling (symptoms) Are: better   Which Meds Were New Meds:  Yes   Which Meds to Continue:  Yes   Which Meds to Stop: no medications were discontinued   Who participated in medication reconciliation with the hospital staff?: you   In your professional opinion do you think there was a medication discrepancy or potential for medication discrepancy in this situation?: no   Medication Issues: no medication issues   Discharge Instructions Clear:   Yes   Patient Has a Primary Care Provider:     Yes   Post-hospitalization Follow-up Occurred According To Schedule:    No   Reason:  appointment not scheduled, encouraged patient to call for an appointment    Delivered Baby(ies): Yes   Chest Pain: no   Shortness of breath or difficulty breathing: no   Seizures:  no   Any thoughts of hurting yourself or your baby:  no   Bleeding that is soaking through one pad/hour or blood clots the size of an egg or bigger:  no   Incision that is not healing: no   Red or swollen leg that is painful or warm to the touch:  No   Temperature of 100.4F or higher:  No   Headache that does not get better, even after taking medicine, or a bad headache with vision changes:  No   Where or in what is your baby sleeping?: rebecca   ABC's of sleep covered:  Yes   How Are You Feeding Your Baby(ies):bottle   Baby Getting Anything Other Than Breastmilk Or Formula For Food:  No   Patient Has Primary Care Provider For Baby(ies):  Yes   Baby Has Been Seen By a Health Care Provider Since Discharge:  No   Which Health Care Provider Saw the Baby: physician office/clinic visit Appt 1/2/24   Mom's Discharge Date: 12/29/23   Date the Baby Was Seen By a Health Care Provider After Discharge: Appt. Scheduled 1/2/24   Patient Has Plan Specific Name And Number Of Who To Call For Concerns:  Yes   Stroke Patient:   No  Comments:    Date/Time Of Call: 12/30/23 at 1447   Call Back Done By: care coordinator   Callback Complete:  Yes

## 2024-01-02 ENCOUNTER — HOSPITAL ENCOUNTER (INPATIENT)
Facility: HOSPITAL | Age: 34
LOS: 3 days | Discharge: HOME | DRG: 776 | End: 2024-01-05
Attending: OBSTETRICS & GYNECOLOGY | Admitting: STUDENT IN AN ORGANIZED HEALTH CARE EDUCATION/TRAINING PROGRAM
Payer: COMMERCIAL

## 2024-01-02 PROBLEM — O34.219 PREVIOUS CESAREAN DELIVERY, DELIVERED (HHS-HCC): Status: ACTIVE | Noted: 2024-01-02

## 2024-01-02 PROBLEM — R51.9 POSTPARTUM HEADACHE (HHS-HCC): Status: ACTIVE | Noted: 2024-01-02

## 2024-01-02 PROBLEM — O13.9 GESTATIONAL HYPERTENSION, UNSPECIFIED TRIMESTER (HHS-HCC): Status: ACTIVE | Noted: 2024-01-02

## 2024-01-02 PROBLEM — I10 HYPERTENSION: Status: RESOLVED | Noted: 2023-08-30 | Resolved: 2024-01-02

## 2024-01-02 PROBLEM — Z87.59 HISTORY OF PRE-ECLAMPSIA: Status: ACTIVE | Noted: 2024-01-02

## 2024-01-02 LAB
ALBUMIN SERPL BCP-MCNC: 4 G/DL (ref 3.4–5)
ALP SERPL-CCNC: 83 U/L (ref 33–110)
ALT SERPL W P-5'-P-CCNC: 17 U/L (ref 7–45)
ANION GAP SERPL CALC-SCNC: 14 MMOL/L (ref 10–20)
AST SERPL W P-5'-P-CCNC: 15 U/L (ref 9–39)
BILIRUB SERPL-MCNC: 0.3 MG/DL (ref 0–1.2)
BUN SERPL-MCNC: 20 MG/DL (ref 6–23)
CALCIUM SERPL-MCNC: 9.2 MG/DL (ref 8.6–10.6)
CHLORIDE SERPL-SCNC: 106 MMOL/L (ref 98–107)
CO2 SERPL-SCNC: 23 MMOL/L (ref 21–32)
CREAT SERPL-MCNC: 0.66 MG/DL (ref 0.5–1.05)
ERYTHROCYTE [DISTWIDTH] IN BLOOD BY AUTOMATED COUNT: 12.6 % (ref 11.5–14.5)
GFR SERPL CREATININE-BSD FRML MDRD: >90 ML/MIN/1.73M*2
GLUCOSE SERPL-MCNC: 110 MG/DL (ref 74–99)
HCT VFR BLD AUTO: 30.8 % (ref 36–46)
HGB BLD-MCNC: 9.8 G/DL (ref 12–16)
LABORATORY COMMENT REPORT: NORMAL
LDH SERPL L TO P-CCNC: 179 U/L (ref 84–246)
MCH RBC QN AUTO: 28.9 PG (ref 26–34)
MCHC RBC AUTO-ENTMCNC: 31.8 G/DL (ref 32–36)
MCV RBC AUTO: 91 FL (ref 80–100)
NRBC BLD-RTO: 0 /100 WBCS (ref 0–0)
PATH REPORT.FINAL DX SPEC: NORMAL
PATH REPORT.GROSS SPEC: NORMAL
PATH REPORT.RELEVANT HX SPEC: NORMAL
PATH REPORT.TOTAL CANCER: NORMAL
PLATELET # BLD AUTO: 381 X10*3/UL (ref 150–450)
POC APPEARANCE, URINE: CLEAR
POC BILIRUBIN, URINE: NEGATIVE
POC BLOOD, URINE: ABNORMAL
POC COLOR, URINE: YELLOW
POC GLUCOSE, URINE: NEGATIVE MG/DL
POC KETONES, URINE: NEGATIVE MG/DL
POC LEUKOCYTES, URINE: NEGATIVE
POC NITRITE,URINE: NEGATIVE
POC PH, URINE: 5.5 PH
POC PROTEIN, URINE: NEGATIVE MG/DL
POC SPECIFIC GRAVITY, URINE: >=1.03
POC UROBILINOGEN, URINE: 0.2 EU/DL
POTASSIUM SERPL-SCNC: 4.3 MMOL/L (ref 3.5–5.3)
PROT SERPL-MCNC: 6.4 G/DL (ref 6.4–8.2)
RBC # BLD AUTO: 3.39 X10*6/UL (ref 4–5.2)
SODIUM SERPL-SCNC: 139 MMOL/L (ref 136–145)
URATE SERPL-MCNC: 5.4 MG/DL (ref 2.3–6.7)
WBC # BLD AUTO: 4.8 X10*3/UL (ref 4.4–11.3)

## 2024-01-02 PROCEDURE — 86901 BLOOD TYPING SEROLOGIC RH(D): CPT

## 2024-01-02 PROCEDURE — 85027 COMPLETE CBC AUTOMATED: CPT

## 2024-01-02 PROCEDURE — 2500000001 HC RX 250 WO HCPCS SELF ADMINISTERED DRUGS (ALT 637 FOR MEDICARE OP)

## 2024-01-02 PROCEDURE — 84550 ASSAY OF BLOOD/URIC ACID: CPT

## 2024-01-02 PROCEDURE — 36415 COLL VENOUS BLD VENIPUNCTURE: CPT

## 2024-01-02 PROCEDURE — 99222 1ST HOSP IP/OBS MODERATE 55: CPT

## 2024-01-02 PROCEDURE — 1210000001 HC SEMI-PRIVATE ROOM DAILY

## 2024-01-02 PROCEDURE — 83615 LACTATE (LD) (LDH) ENZYME: CPT

## 2024-01-02 PROCEDURE — 84075 ASSAY ALKALINE PHOSPHATASE: CPT

## 2024-01-02 PROCEDURE — 99213 OFFICE O/P EST LOW 20 MIN: CPT | Mod: 25

## 2024-01-02 RX ORDER — BISACODYL 10 MG/1
10 SUPPOSITORY RECTAL DAILY PRN
Status: DISCONTINUED | OUTPATIENT
Start: 2024-01-02 | End: 2024-01-05 | Stop reason: HOSPADM

## 2024-01-02 RX ORDER — METOCLOPRAMIDE 10 MG/1
10 TABLET ORAL EVERY 6 HOURS PRN
Status: DISCONTINUED | OUTPATIENT
Start: 2024-01-02 | End: 2024-01-05 | Stop reason: HOSPADM

## 2024-01-02 RX ORDER — MISOPROSTOL 200 UG/1
800 TABLET ORAL ONCE AS NEEDED
Status: DISCONTINUED | OUTPATIENT
Start: 2024-01-02 | End: 2024-01-05 | Stop reason: HOSPADM

## 2024-01-02 RX ORDER — ONDANSETRON 4 MG/1
4 TABLET, FILM COATED ORAL EVERY 6 HOURS PRN
Status: DISCONTINUED | OUTPATIENT
Start: 2024-01-02 | End: 2024-01-05 | Stop reason: HOSPADM

## 2024-01-02 RX ORDER — SIMETHICONE 80 MG
80 TABLET,CHEWABLE ORAL 4 TIMES DAILY PRN
Status: DISCONTINUED | OUTPATIENT
Start: 2024-01-02 | End: 2024-01-05 | Stop reason: HOSPADM

## 2024-01-02 RX ORDER — ADHESIVE BANDAGE
10 BANDAGE TOPICAL
Status: DISCONTINUED | OUTPATIENT
Start: 2024-01-02 | End: 2024-01-05 | Stop reason: HOSPADM

## 2024-01-02 RX ORDER — LIDOCAINE HYDROCHLORIDE 10 MG/ML
0.5 INJECTION INFILTRATION; PERINEURAL ONCE AS NEEDED
Status: DISCONTINUED | OUTPATIENT
Start: 2024-01-02 | End: 2024-01-05 | Stop reason: HOSPADM

## 2024-01-02 RX ORDER — TRANEXAMIC ACID 100 MG/ML
1000 INJECTION, SOLUTION INTRAVENOUS ONCE AS NEEDED
Status: DISCONTINUED | OUTPATIENT
Start: 2024-01-02 | End: 2024-01-05 | Stop reason: HOSPADM

## 2024-01-02 RX ORDER — NIFEDIPINE 10 MG/1
10 CAPSULE ORAL ONCE AS NEEDED
Status: DISCONTINUED | OUTPATIENT
Start: 2024-01-02 | End: 2024-01-05 | Stop reason: HOSPADM

## 2024-01-02 RX ORDER — IBUPROFEN 600 MG/1
600 TABLET ORAL EVERY 6 HOURS SCHEDULED
Status: DISCONTINUED | OUTPATIENT
Start: 2024-01-02 | End: 2024-01-05 | Stop reason: HOSPADM

## 2024-01-02 RX ORDER — METOCLOPRAMIDE 10 MG/1
10 TABLET ORAL ONCE
Status: COMPLETED | OUTPATIENT
Start: 2024-01-02 | End: 2024-01-02

## 2024-01-02 RX ORDER — DIPHENHYDRAMINE HCL 25 MG
25 CAPSULE ORAL ONCE
Status: COMPLETED | OUTPATIENT
Start: 2024-01-02 | End: 2024-01-02

## 2024-01-02 RX ORDER — LABETALOL HYDROCHLORIDE 5 MG/ML
20 INJECTION, SOLUTION INTRAVENOUS ONCE AS NEEDED
Status: DISCONTINUED | OUTPATIENT
Start: 2024-01-02 | End: 2024-01-05 | Stop reason: HOSPADM

## 2024-01-02 RX ORDER — LABETALOL 100 MG/1
200 TABLET, FILM COATED ORAL 2 TIMES DAILY
Status: DISCONTINUED | OUTPATIENT
Start: 2024-01-02 | End: 2024-01-03

## 2024-01-02 RX ORDER — OXYTOCIN 10 [USP'U]/ML
10 INJECTION, SOLUTION INTRAMUSCULAR; INTRAVENOUS ONCE AS NEEDED
Status: DISCONTINUED | OUTPATIENT
Start: 2024-01-02 | End: 2024-01-05 | Stop reason: HOSPADM

## 2024-01-02 RX ORDER — CARBOPROST TROMETHAMINE 250 UG/ML
250 INJECTION, SOLUTION INTRAMUSCULAR ONCE AS NEEDED
Status: DISCONTINUED | OUTPATIENT
Start: 2024-01-02 | End: 2024-01-05 | Stop reason: HOSPADM

## 2024-01-02 RX ORDER — METHYLERGONOVINE MALEATE 0.2 MG/ML
0.2 INJECTION INTRAVENOUS ONCE AS NEEDED
Status: DISCONTINUED | OUTPATIENT
Start: 2024-01-02 | End: 2024-01-05 | Stop reason: HOSPADM

## 2024-01-02 RX ORDER — METOCLOPRAMIDE HYDROCHLORIDE 5 MG/ML
10 INJECTION INTRAMUSCULAR; INTRAVENOUS ONCE
Status: COMPLETED | OUTPATIENT
Start: 2024-01-02 | End: 2024-01-02

## 2024-01-02 RX ORDER — ACETAMINOPHEN 325 MG/1
975 TABLET ORAL ONCE
Status: COMPLETED | OUTPATIENT
Start: 2024-01-02 | End: 2024-01-02

## 2024-01-02 RX ORDER — POLYETHYLENE GLYCOL 3350 17 G/17G
17 POWDER, FOR SOLUTION ORAL 2 TIMES DAILY PRN
Status: DISCONTINUED | OUTPATIENT
Start: 2024-01-02 | End: 2024-01-05 | Stop reason: HOSPADM

## 2024-01-02 RX ORDER — ONDANSETRON HYDROCHLORIDE 2 MG/ML
4 INJECTION, SOLUTION INTRAVENOUS EVERY 6 HOURS PRN
Status: DISCONTINUED | OUTPATIENT
Start: 2024-01-02 | End: 2024-01-05 | Stop reason: HOSPADM

## 2024-01-02 RX ORDER — DIPHENHYDRAMINE HYDROCHLORIDE 50 MG/ML
25 INJECTION INTRAMUSCULAR; INTRAVENOUS ONCE
Status: COMPLETED | OUTPATIENT
Start: 2024-01-02 | End: 2024-01-02

## 2024-01-02 RX ORDER — HYDRALAZINE HYDROCHLORIDE 20 MG/ML
5 INJECTION INTRAMUSCULAR; INTRAVENOUS ONCE AS NEEDED
Status: COMPLETED | OUTPATIENT
Start: 2024-01-02 | End: 2024-01-03

## 2024-01-02 RX ORDER — ACETAMINOPHEN 325 MG/1
975 TABLET ORAL EVERY 6 HOURS SCHEDULED
Status: DISCONTINUED | OUTPATIENT
Start: 2024-01-03 | End: 2024-01-05 | Stop reason: HOSPADM

## 2024-01-02 RX ORDER — LOPERAMIDE HYDROCHLORIDE 2 MG/1
4 CAPSULE ORAL EVERY 2 HOUR PRN
Status: DISCONTINUED | OUTPATIENT
Start: 2024-01-02 | End: 2024-01-05 | Stop reason: HOSPADM

## 2024-01-02 RX ORDER — METOCLOPRAMIDE HYDROCHLORIDE 5 MG/ML
10 INJECTION INTRAMUSCULAR; INTRAVENOUS EVERY 6 HOURS PRN
Status: DISCONTINUED | OUTPATIENT
Start: 2024-01-02 | End: 2024-01-05 | Stop reason: HOSPADM

## 2024-01-02 RX ADMIN — DIPHENHYDRAMINE HYDROCHLORIDE 25 MG: 25 CAPSULE ORAL at 20:02

## 2024-01-02 RX ADMIN — METOCLOPRAMIDE 10 MG: 10 TABLET ORAL at 20:02

## 2024-01-02 RX ADMIN — LABETALOL HYDROCHLORIDE 200 MG: 100 TABLET, FILM COATED ORAL at 20:40

## 2024-01-02 RX ADMIN — ACETAMINOPHEN 975 MG: 325 TABLET ORAL at 20:02

## 2024-01-02 RX ADMIN — IBUPROFEN 600 MG: 600 TABLET, FILM COATED ORAL at 22:14

## 2024-01-02 SDOH — SOCIAL STABILITY: SOCIAL INSECURITY: DO YOU FEEL ANYONE HAS EXPLOITED OR TAKEN ADVANTAGE OF YOU FINANCIALLY OR OF YOUR PERSONAL PROPERTY?: NO

## 2024-01-02 SDOH — HEALTH STABILITY: MENTAL HEALTH: WERE YOU ABLE TO COMPLETE ALL THE BEHAVIORAL HEALTH SCREENINGS?: YES

## 2024-01-02 SDOH — HEALTH STABILITY: MENTAL HEALTH: HAVE YOU USED ANY SUBSTANCES (CANABIS, COCAINE, HEROIN, HALLUCINOGENS, INHALANTS, ETC.) IN THE PAST 12 MONTHS?: NO

## 2024-01-02 SDOH — ECONOMIC STABILITY: HOUSING INSECURITY: DO YOU FEEL UNSAFE GOING BACK TO THE PLACE WHERE YOU ARE LIVING?: NO

## 2024-01-02 SDOH — SOCIAL STABILITY: SOCIAL INSECURITY: HAVE YOU HAD THOUGHTS OF HARMING ANYONE ELSE?: NO

## 2024-01-02 SDOH — HEALTH STABILITY: MENTAL HEALTH: HAVE YOU USED ANY PRESCRIPTION DRUGS OTHER THAN PRESCRIBED IN THE PAST 12 MONTHS?: NO

## 2024-01-02 SDOH — SOCIAL STABILITY: SOCIAL INSECURITY: ABUSE SCREEN: ADULT

## 2024-01-02 SDOH — SOCIAL STABILITY: SOCIAL INSECURITY: ARE THERE ANY APPARENT SIGNS OF INJURIES/BEHAVIORS THAT COULD BE RELATED TO ABUSE/NEGLECT?: NO

## 2024-01-02 SDOH — SOCIAL STABILITY: SOCIAL INSECURITY: PHYSICAL ABUSE: DENIES

## 2024-01-02 SDOH — SOCIAL STABILITY: SOCIAL INSECURITY: VERBAL ABUSE: DENIES

## 2024-01-02 SDOH — SOCIAL STABILITY: SOCIAL INSECURITY: HAS ANYONE EVER THREATENED TO HURT YOUR FAMILY OR YOUR PETS?: NO

## 2024-01-02 SDOH — SOCIAL STABILITY: SOCIAL INSECURITY: ARE YOU OR HAVE YOU BEEN THREATENED OR ABUSED PHYSICALLY, EMOTIONALLY, OR SEXUALLY BY ANYONE?: NO

## 2024-01-02 SDOH — SOCIAL STABILITY: SOCIAL INSECURITY: DOES ANYONE TRY TO KEEP YOU FROM HAVING/CONTACTING OTHER FRIENDS OR DOING THINGS OUTSIDE YOUR HOME?: NO

## 2024-01-02 ASSESSMENT — PAIN SCALES - GENERAL
PAINLEVEL_OUTOF10: 6
PAINLEVEL: 6
PAINLEVEL_OUTOF10: 2
PAINLEVEL_OUTOF10: 6
PAINLEVEL_OUTOF10: 6

## 2024-01-02 ASSESSMENT — LIFESTYLE VARIABLES
EVER HAD A DRINK FIRST THING IN THE MORNING TO STEADY YOUR NERVES TO GET RID OF A HANGOVER: NO
HAVE PEOPLE ANNOYED YOU BY CRITICIZING YOUR DRINKING: NO
HOW OFTEN DO YOU HAVE 6 OR MORE DRINKS ON ONE OCCASION: NEVER
HOW MANY STANDARD DRINKS CONTAINING ALCOHOL DO YOU HAVE ON A TYPICAL DAY: PATIENT DOES NOT DRINK
REASON UNABLE TO ASSESS: NO
SKIP TO QUESTIONS 9-10: 1
EVER FELT BAD OR GUILTY ABOUT YOUR DRINKING: NO
AUDIT-C TOTAL SCORE: 0
HOW OFTEN DO YOU HAVE A DRINK CONTAINING ALCOHOL: NEVER
AUDIT-C TOTAL SCORE: 0
HAVE YOU EVER FELT YOU SHOULD CUT DOWN ON YOUR DRINKING: NO

## 2024-01-02 ASSESSMENT — PAIN - FUNCTIONAL ASSESSMENT
PAIN_FUNCTIONAL_ASSESSMENT: 0-10

## 2024-01-02 ASSESSMENT — PAIN DESCRIPTION - LOCATION
LOCATION: HEAD
LOCATION: INCISION
LOCATION: HEAD

## 2024-01-02 ASSESSMENT — PAIN DESCRIPTION - DESCRIPTORS
DESCRIPTORS: HEADACHE
DESCRIPTORS: ACHING

## 2024-01-02 ASSESSMENT — COLUMBIA-SUICIDE SEVERITY RATING SCALE - C-SSRS
1. IN THE PAST MONTH, HAVE YOU WISHED YOU WERE DEAD OR WISHED YOU COULD GO TO SLEEP AND NOT WAKE UP?: NO
2. HAVE YOU ACTUALLY HAD ANY THOUGHTS OF KILLING YOURSELF?: NO
6. HAVE YOU EVER DONE ANYTHING, STARTED TO DO ANYTHING, OR PREPARED TO DO ANYTHING TO END YOUR LIFE?: NO

## 2024-01-02 ASSESSMENT — PAIN DESCRIPTION - PAIN TYPE: TYPE: ACUTE PAIN

## 2024-01-02 NOTE — ED TRIAGE NOTES
Patient arrived to ED reporting a headache and hypertension  1 week post op, she had pre eclampsia during her first pregnancy at home her BP was 150/100

## 2024-01-03 LAB
ABO GROUP (TYPE) IN BLOOD: NORMAL
ANTIBODY SCREEN: NORMAL
RH FACTOR (ANTIGEN D): NORMAL

## 2024-01-03 PROCEDURE — 2500000001 HC RX 250 WO HCPCS SELF ADMINISTERED DRUGS (ALT 637 FOR MEDICARE OP)

## 2024-01-03 PROCEDURE — 2500000001 HC RX 250 WO HCPCS SELF ADMINISTERED DRUGS (ALT 637 FOR MEDICARE OP): Performed by: STUDENT IN AN ORGANIZED HEALTH CARE EDUCATION/TRAINING PROGRAM

## 2024-01-03 PROCEDURE — 1210000001 HC SEMI-PRIVATE ROOM DAILY

## 2024-01-03 PROCEDURE — 2500000004 HC RX 250 GENERAL PHARMACY W/ HCPCS (ALT 636 FOR OP/ED)

## 2024-01-03 PROCEDURE — 2500000004 HC RX 250 GENERAL PHARMACY W/ HCPCS (ALT 636 FOR OP/ED): Performed by: OBSTETRICS & GYNECOLOGY

## 2024-01-03 RX ORDER — DIPHENHYDRAMINE HCL 25 MG
25 CAPSULE ORAL ONCE
Status: COMPLETED | OUTPATIENT
Start: 2024-01-03 | End: 2024-01-03

## 2024-01-03 RX ORDER — LABETALOL 100 MG/1
200 TABLET, FILM COATED ORAL 2 TIMES DAILY
Qty: 120 TABLET | Refills: 1 | Status: CANCELLED | OUTPATIENT
Start: 2024-01-03 | End: 2024-03-03

## 2024-01-03 RX ORDER — HYDRALAZINE HYDROCHLORIDE 20 MG/ML
10 INJECTION INTRAMUSCULAR; INTRAVENOUS ONCE
Status: COMPLETED | OUTPATIENT
Start: 2024-01-03 | End: 2024-01-03

## 2024-01-03 RX ORDER — LABETALOL 100 MG/1
400 TABLET, FILM COATED ORAL 2 TIMES DAILY
Status: DISCONTINUED | OUTPATIENT
Start: 2024-01-03 | End: 2024-01-04

## 2024-01-03 RX ORDER — METOCLOPRAMIDE 10 MG/1
10 TABLET ORAL ONCE
Status: DISCONTINUED | OUTPATIENT
Start: 2024-01-03 | End: 2024-01-05 | Stop reason: HOSPADM

## 2024-01-03 RX ADMIN — IBUPROFEN 600 MG: 600 TABLET, FILM COATED ORAL at 22:54

## 2024-01-03 RX ADMIN — ACETAMINOPHEN 975 MG: 325 TABLET ORAL at 17:14

## 2024-01-03 RX ADMIN — METOCLOPRAMIDE 10 MG: 10 TABLET ORAL at 14:48

## 2024-01-03 RX ADMIN — LABETALOL HYDROCHLORIDE 200 MG: 100 TABLET, FILM COATED ORAL at 08:23

## 2024-01-03 RX ADMIN — DIPHENHYDRAMINE HYDROCHLORIDE 25 MG: 25 CAPSULE ORAL at 14:48

## 2024-01-03 RX ADMIN — ACETAMINOPHEN 975 MG: 325 TABLET ORAL at 09:50

## 2024-01-03 RX ADMIN — IBUPROFEN 600 MG: 600 TABLET, FILM COATED ORAL at 09:50

## 2024-01-03 RX ADMIN — IBUPROFEN 600 MG: 600 TABLET, FILM COATED ORAL at 03:45

## 2024-01-03 RX ADMIN — ACETAMINOPHEN 975 MG: 325 TABLET ORAL at 03:45

## 2024-01-03 RX ADMIN — IBUPROFEN 600 MG: 600 TABLET, FILM COATED ORAL at 17:15

## 2024-01-03 RX ADMIN — HYDRALAZINE HYDROCHLORIDE 10 MG: 20 INJECTION INTRAMUSCULAR; INTRAVENOUS at 09:03

## 2024-01-03 RX ADMIN — LABETALOL HYDROCHLORIDE 400 MG: 100 TABLET, FILM COATED ORAL at 21:21

## 2024-01-03 RX ADMIN — ACETAMINOPHEN 975 MG: 325 TABLET ORAL at 22:54

## 2024-01-03 RX ADMIN — HYDRALAZINE HYDROCHLORIDE 5 MG: 20 INJECTION INTRAMUSCULAR; INTRAVENOUS at 08:33

## 2024-01-03 ASSESSMENT — PAIN SCALES - GENERAL
PAINLEVEL_OUTOF10: 6
PAINLEVEL_OUTOF10: 4
PAINLEVEL_OUTOF10: 0 - NO PAIN
PAINLEVEL_OUTOF10: 0 - NO PAIN
PAINLEVEL_OUTOF10: 3
PAINLEVEL_OUTOF10: 4

## 2024-01-03 ASSESSMENT — PAIN DESCRIPTION - DESCRIPTORS
DESCRIPTORS: HEADACHE
DESCRIPTORS: HEADACHE

## 2024-01-03 NOTE — SIGNIFICANT EVENT
Pt transferred to L&D for severe range BP, now s/p IV hydral 5/10 (1/3 @0900). Labetalol uptitrated to labetalol 400 mg BID for the PM dose. Vitals reviewed, now with 4 hour mild range to normotensive BP since last severe range. Stable for transfer to postpartum for further management.     Melissa Lemus MD

## 2024-01-03 NOTE — PROGRESS NOTES
Clinical event note    Severe range BP x2    S- Pt denies HA's, RUQ pain or visual changes    O-   Vitals:    01/03/24 0822   BP: (!) 160/82   Pulse:    Resp:    Temp:    SpO2:      A/p- SPEC, IV hydral 5mg x1. Labetalol 200 mg po now, transfer to L&D

## 2024-01-03 NOTE — SIGNIFICANT EVENT
Headache resolved. BPs mild-range. Ok for transfer to [facility] for continued observation overnight.    Victor Hugo Sosa MD  Labor and Delivery

## 2024-01-03 NOTE — HOSPITAL COURSE
Pt was readmitted on POD#7 in the s/o gHTN with high mild range BP. HA on arrival, resolved. HELLP labs negative. She was started on labetalol that was uptitrated to 800 mg BID and monitored with adequate BP control. She was discharged home with BP cuff and plan for 3-5 day BP check outpatient.

## 2024-01-03 NOTE — CARE PLAN
The patient's goals for the shift include pain control with PO medications.     The clinical goals for the shift include stable vital signs and assessments.    Problem: Hypertensive Disorder of Pregnancy (HDP)  Goal: Minimal s/sx of HDP and BP<160/110  Outcome: Progressing  Goal: Adequate urine output (0.5 ml/kg/hr)  Outcome: Progressing     Problem: Safety - Adult  Goal: Free from fall injury  Outcome: Progressing

## 2024-01-03 NOTE — PROGRESS NOTES
Postpartum Progress Note    Assessment/Plan   Alba Mendez is a 33 y.o.  POD#8 s/p RCS on   who delivered at 37w4d presenting for elevated blood pressures and headache concerning for pre-eclampsia with severe features.    gHTN vs sPEC  - BPs mild-range in triage, not requiring IR treatment  - Overnight, Bps normal-mild range  - Continue labetalol 200 mg BID (Started 1/2)  - Asymptomatic currently. Headache resolved with Tylenol/Reglan/Benadryl.    Dispo: continue BP obs    Discussed with Dr. Debra Sosa MD  Labor and Delivery    Subjective   Denies complaints currently.    Objective   Allergies:   Bee pollen, Ragweed, and Tree and shrub pollen         Last Vitals:  Temp Pulse Resp BP MAP Pulse Ox   36.5 °C (97.7 °F) 76 16 138/75   95 %     Vitals Min/Max Last 24 Hours:  Temp  Min: 36.2 °C (97.2 °F)  Max: 37.4 °C (99.3 °F)  Pulse  Min: 65  Max: 92  Resp  Min: 16  Max: 17  BP  Min: 124/86  Max: 158/80    Intake/Output:   No intake or output data in the 24 hours ending 24 0521    Physical Exam:  General: Examination reveals a well developed, well nourished, female, in no acute distress. She is alert and cooperative.  HEENT: External ears normal. Nose normal, no erythema or discharge. Mouth and throat clear.  Neck: supple, no significant adenopathy.  Lungs: normal effort.  Cardiac: regular rate.  Abdomen: soft, nontender, nondistended  Extremities: no limitation in range of motion.  Neurological: alert, oriented, normal speech, no focal findings or movement disorder noted.  Psychological: awake and alert; oriented to person, place, and time.    Lab Data:  Labs in chart were reviewed.

## 2024-01-03 NOTE — H&P
Obstetrical Admission History and Physical     Alba Mendez is a 33 y.o.  who delivered 2023  by , [employer]  at 37w4d and is now PPD 7. Patient receives prenatal/postpartum care from Dr. Lara.    Chief Complaint: Hypertension    Assessment/Plan      gHTN, r/o sPEC  - BP WNL while on L&D and PP unit, now having mild range BP at home  - HA 6/10--> tylenol, reglan, benadryl, PO fluids, nap--> 3-410  - urine dip s/f SG > 1.030, trace blood  - HELLP labs negative  - labetalol 200 mg BID starting 1/2 PM for persistent high mild range BP in OB triage  - continue to monitor HA- consider Mg and/or head imaging if not improved    Maternal Well-being  - anxiety/depression symptoms stable on no meds  - All questions and concerns addressed     Dispo  - Admit for 24 observation  - Patient agreeable to admission    Staffed with Dr. Lara. Handoff to night team for further management.  TOR Nixon     Active Problems:    Previous  delivery, delivered    Postpartum state    Postpartum headache    History of pre-eclampsia      Pregnancy Problems (from 23 to present)       Problem Noted Resolved    Rubella non-immune status, antepartum 2023 by Tamara Beasley PA-C No    Priority:  Medium      PTSD (post-traumatic stress disorder) 2023 by Rosie Parekh No    Priority:  Medium      Hypertension 2023 by Rosie Parekh No    Priority:  Medium      Major depressive disorder in remission (CMS/HCC) 2023 by Rosie Parekh No    Priority:  Medium      Excessive fetal growth affecting management of pregnancy in third trimester 12/15/2023 by TOR Bravo 2023 by Candi Lara MD    Overview Signed 12/15/2023  5:10 PM by TOR Bravo     -Already planning   -Increased interval growth on US today, prior AGA fetus  -Delivery planned via  38 wks          Previous  section complicating pregnancy  2023 by Candi Lara MD 2023 by Candi Lara MD    Anxiety in pregnancy, antepartum 2023 by Rosie Parekh 2023 by Candi Lara MD    Gestational diabetes 2023 by Rosie Parekh 2023 by Candi Lara MD    Overview Addendum 12/15/2023  5:09 PM by SAMANTHA Bravo-CNP     -Shared Care with Dr. Lara  - Attended [facility]  - MFM Consult completed  -MFM 36 wk visit (12/15)  -Serial growth ultrasounds starting at 28 weeks    Last ultrasound: 12/15- EFW 92%, AC 96%, BPP , normal ELINA    Fetal surveillance:  -Weekly at 32 weeks  -Twice weekly at 36 weeks    Current Regimen: NPH 16/16--> NPH 16/20* (12/15)-- with review of BG log 3/7 fastings were elevated or borderline.     Delivery Plan: 38wks (She is already scheduled for - 38.6wga with Dr. Lara)  Intrapartum:  GDM protocol  Postpartum: No medication    Recommend PP 2hr gtt and q3yr F/U with PCP for A1C and TSH           History of  delivery, currently pregnant in first trimester 2023 by Rosie Parekh 2023 by Candi Lara MD    Panic attacks 2023 by Rosie Parekh 2023 by Candi Lara MD    Postpartum depression 2023 by Rosie Parekh 2023 by Candi Lara MD    Anemia, antepartum 2023 by Rosie Parekh 2023 by Candi Lara MD    H/O gestational diabetes in prior pregnancy, currently pregnant 2023 by Rosie Parekh 2023 by Candi Lara MD          Chadwick Willis is here with hypertension   Hypertension symptoms: Headache    The patient is being admitted for further evaluation and monitoring.     Patient has been taking BP at home since discharge on POD#3 for h/o PEC. Low mild range BP on POD#4, POD#5, and POD#6. High mild BP today, 151/100. Patient reports HA started today at 3pm. She has a history of migraines in the past, but this HA feels different. She called Dr Lara  who advised her to come to L&D for evaluation. She last took tylenol at 1pm and ibuprofen at 4pm. Denies scotoma, SOB, RUQ pain. Bilateral lower extremity edema began a couple days postpartum. Denies asymmetrical swelling, calf pain, redness.    Post-op recovery: CS incision painful, but gradually improving. Denies drainage, redness. Vaginal bleeding is very light.      Obstetrical History   OB History    Para Term  AB Living   4 3 2 1 1 3   SAB IAB Ectopic Multiple Live Births   1     0 3      # Outcome Date GA Lbr Danyel/2nd Weight Sex Delivery Anes PTL Lv   4 Term 23 37w4d  3450 g M CS-LTranv Spinal, EPI  SANDI   3 Term 18 39w5d  3459 g F CS-Unspec EPI  SANDI      Complications: Failure to Progress in Second Stage, Failure to progress in labor   2 SAB  16w0d   F    FD   1   25w0d  567 g F Vag-Spont  Y SANDI      Obstetric Comments   Blood Pressure Kit Device - Pt to take Blood pressure at least once a day 2023   Unspecified Medication - blood pressire cuff 2023   Aspirin 81 MG Oral Tablet Chewable - CHEW 1 TABLET BY MOUTH EVERY DAY 2023   Ferrous Sulfate 325 (65 Fe) MG Oral Tablet - TAKE 1 TABLET BY MOUTH EVERY DAY AS DIRECTED 2023   Prenatal Vitamins 28-0.8 MG Oral Tablet - TAKE 1 TABLET BY MOUTH EVERY DAY 2023       Past Medical History  Past Medical History:   Diagnosis Date    Hypertension 2023    H/o HTN, now resolved. Last elevated BP 2021.    Personal history of gestational diabetes     History of gestational diabetes mellitus (GDM)    Personal history of other complications of pregnancy, childbirth and the puerperium     History of pre-eclampsia    Personal history of other diseases of the respiratory system     History of asthma        Past Surgical History   Past Surgical History:   Procedure Laterality Date    OTHER SURGICAL HISTORY  2019    Breast surgery    OTHER SURGICAL HISTORY  2019     section    OTHER  SURGICAL HISTORY  08/19/2019    Oral surgery       Social History  Social History     Tobacco Use    Smoking status: Never    Smokeless tobacco: Never   Substance Use Topics    Alcohol use: Never     Substance and Sexual Activity   Drug Use Never       Allergies  Bee pollen, Ragweed, and Tree and shrub pollen     Medications  Medications Prior to Admission   Medication Sig Dispense Refill Last Dose    acetaminophen (Tylenol) 325 mg tablet Take 3 tablets (975 mg) by mouth every 6 hours. 60 tablet 2 1/2/2024 at 1300    ergocalciferol (Vitamin D-2) 1.25 MG (08259 UT) capsule Take 1 capsule (1,250 mcg) by mouth 1 (one) time per week.       ibuprofen 600 mg tablet Take 1 tablet (600 mg) by mouth every 6 hours. 60 tablet 2 1/2/2024 at 1600    oxyCODONE (Roxicodone) 5 mg immediate release tablet Take 1 tablet (5 mg) by mouth every 4 hours if needed (Pain score 6-8). 15 tablet 0     PNV no.95/ferrous fum/folic ac (PRENATAL ORAL) 1 tablet once daily.       polyethylene glycol (Glycolax, Miralax) 17 gram packet Take 17 g (1 packet dissolved in 8 ounces of water or clear juice)  by mouth 2 times a day as needed (first line). 10 packet 1     Prenatal 28 mg iron- 800 mcg tablet           Objective    Last Vitals  Temp Pulse Resp BP MAP O2 Sat   37.4 °C (99.3 °F) 72 17 139/83   99 %     Physical Examination  General: Examination reveals a well developed, well nourished, female, in no acute distress. She is alert and cooperative.  Lungs: symmetrical, non-labored breathing. Lungs CTA BL  Cardiac: warm, well-perfused. S1 & S2, no murmur  Abdomen: soft, non-tender.  Fundus: firm, below umbilicus, and nontender.  INCISION: healing well, approximated, no erythema, edema or discharge  Extremities: no redness or tenderness in the calves or thighs. Mild edema of BLE  Neurological: alert, oriented, normal speech, no focal findings or movement disorder noted.     Lab Review  Admission on 01/02/2024   Component Date Value Ref Range Status     Glucose 01/02/2024 110 (H)  74 - 99 mg/dL Final    Sodium 01/02/2024 139  136 - 145 mmol/L Final    Potassium 01/02/2024 4.3  3.5 - 5.3 mmol/L Final    Chloride 01/02/2024 106  98 - 107 mmol/L Final    Bicarbonate 01/02/2024 23  21 - 32 mmol/L Final    Anion Gap 01/02/2024 14  10 - 20 mmol/L Final    Urea Nitrogen 01/02/2024 20  6 - 23 mg/dL Final    Creatinine 01/02/2024 0.66  0.50 - 1.05 mg/dL Final    eGFR 01/02/2024 >90  >60 mL/min/1.73m*2 Final    Calculations of estimated GFR are performed using the 2021 CKD-EPI Study Refit equation without the race variable for the IDMS-Traceable creatinine methods.  https://jasn.asnjournals.org/content/early/2021/09/22/ASN.1484945709    Calcium 01/02/2024 9.2  8.6 - 10.6 mg/dL Final    Albumin 01/02/2024 4.0  3.4 - 5.0 g/dL Final    Alkaline Phosphatase 01/02/2024 83  33 - 110 U/L Final    Total Protein 01/02/2024 6.4  6.4 - 8.2 g/dL Final    AST 01/02/2024 15  9 - 39 U/L Final    Bilirubin, Total 01/02/2024 0.3  0.0 - 1.2 mg/dL Final    ALT 01/02/2024 17  7 - 45 U/L Final    Patients treated with Sulfasalazine may generate falsely decreased results for ALT.    WBC 01/02/2024 4.8  4.4 - 11.3 x10*3/uL Final    nRBC 01/02/2024 0.0  0.0 - 0.0 /100 WBCs Final    RBC 01/02/2024 3.39 (L)  4.00 - 5.20 x10*6/uL Final    Hemoglobin 01/02/2024 9.8 (L)  12.0 - 16.0 g/dL Final    Hematocrit 01/02/2024 30.8 (L)  36.0 - 46.0 % Final    MCV 01/02/2024 91  80 - 100 fL Final    MCH 01/02/2024 28.9  26.0 - 34.0 pg Final    MCHC 01/02/2024 31.8 (L)  32.0 - 36.0 g/dL Final    RDW 01/02/2024 12.6  11.5 - 14.5 % Final    Platelets 01/02/2024 381  150 - 450 x10*3/uL Final    Uric Acid 01/02/2024 5.4  2.3 - 6.7 mg/dL Final    Venipuncture immediately after or during the administration of Metamizole may lead to falsely low results. Testing should be performed immediately  prior to Metamizole dosing.    LDH 01/02/2024 179  84 - 246 U/L Final    POC Color, Urine 01/02/2024 Yellow  Straw, Yellow,  Light-Yellow In process    POC Appearance, Urine 01/02/2024 Clear  Clear In process    POC Glucose, Urine 01/02/2024 NEGATIVE  NEGATIVE mg/dl In process    POC Bilirubin, Urine 01/02/2024 NEGATIVE  NEGATIVE In process    POC Ketones, Urine 01/02/2024 NEGATIVE  NEGATIVE mg/dl In process    POC Specific Gravity, Urine 01/02/2024 >=1.030  1.005 - 1.035 In process    POC Blood, Urine 01/02/2024 TRACE-Intact (A)  NEGATIVE In process    POC PH, Urine 01/02/2024 5.5  No Reference Range Established PH In process    POC Protein, Urine 01/02/2024 NEGATIVE  NEGATIVE, 30 (1+) mg/dl In process    POC Urobilinogen, Urine 01/02/2024 0.2  0.2, 1.0 EU/DL In process    Poc Nitrite, Urine 01/02/2024 NEGATIVE  NEGATIVE In process    POC Leukocytes, Urine 01/02/2024 NEGATIVE  NEGATIVE In process

## 2024-01-04 PROCEDURE — 2500000001 HC RX 250 WO HCPCS SELF ADMINISTERED DRUGS (ALT 637 FOR MEDICARE OP): Performed by: STUDENT IN AN ORGANIZED HEALTH CARE EDUCATION/TRAINING PROGRAM

## 2024-01-04 PROCEDURE — 2500000004 HC RX 250 GENERAL PHARMACY W/ HCPCS (ALT 636 FOR OP/ED): Performed by: STUDENT IN AN ORGANIZED HEALTH CARE EDUCATION/TRAINING PROGRAM

## 2024-01-04 PROCEDURE — 99232 SBSQ HOSP IP/OBS MODERATE 35: CPT | Performed by: STUDENT IN AN ORGANIZED HEALTH CARE EDUCATION/TRAINING PROGRAM

## 2024-01-04 PROCEDURE — 1210000001 HC SEMI-PRIVATE ROOM DAILY

## 2024-01-04 RX ORDER — LABETALOL 200 MG/1
800 TABLET, FILM COATED ORAL 2 TIMES DAILY
Status: DISCONTINUED | OUTPATIENT
Start: 2024-01-04 | End: 2024-01-05 | Stop reason: HOSPADM

## 2024-01-04 RX ORDER — LABETALOL 200 MG/1
800 TABLET, FILM COATED ORAL 2 TIMES DAILY
Status: DISCONTINUED | OUTPATIENT
Start: 2024-01-04 | End: 2024-01-04

## 2024-01-04 RX ADMIN — ACETAMINOPHEN 975 MG: 325 TABLET ORAL at 05:13

## 2024-01-04 RX ADMIN — IBUPROFEN 600 MG: 600 TABLET, FILM COATED ORAL at 05:14

## 2024-01-04 RX ADMIN — LABETALOL HYDROCHLORIDE 800 MG: 200 TABLET, FILM COATED ORAL at 05:13

## 2024-01-04 RX ADMIN — ACETAMINOPHEN 975 MG: 325 TABLET ORAL at 10:58

## 2024-01-04 RX ADMIN — ACETAMINOPHEN 975 MG: 325 TABLET ORAL at 17:00

## 2024-01-04 RX ADMIN — IBUPROFEN 600 MG: 600 TABLET, FILM COATED ORAL at 11:00

## 2024-01-04 RX ADMIN — ONDANSETRON 4 MG: 2 INJECTION INTRAMUSCULAR; INTRAVENOUS at 17:10

## 2024-01-04 RX ADMIN — LABETALOL HYDROCHLORIDE 800 MG: 200 TABLET, FILM COATED ORAL at 16:40

## 2024-01-04 ASSESSMENT — PAIN SCALES - GENERAL
PAINLEVEL_OUTOF10: 3
PAINLEVEL_OUTOF10: 4
PAINLEVEL_OUTOF10: 0 - NO PAIN
PAINLEVEL_OUTOF10: 4
PAINLEVEL_OUTOF10: 4

## 2024-01-04 NOTE — PROGRESS NOTES
Postpartum Progress Note    Assessment/Plan   Alba Mendez is a 33 y.o.  POD#9 s/p RCS on   who delivered at 37w4d readmitted with sPEC    sPEC  - diagnosed by severe range BP requiring IV treatment  - s/p hydral /10 (1/3 AM)  - Continue labetalol 2800 mg BID (uptitrated 1/4 AM)    Postpartum  - Routine postpartum care    Dispo: given recent uptitration in BP medication, continue inpatient observation for BP control    Discussed with Dr. Shahram Lemus MD  [facility] Pager [phone]    Subjective   Denies HA, vision changes, CP/SOB, RUQ pain.    Objective   Allergies:   Bee pollen, Ragweed, and Tree and shrub pollen         Last Vitals:  Temp Pulse Resp BP MAP Pulse Ox   36.9 °C (98.4 °F) 85 16 (!) 148/82 (RN made aware.)   96 %     Vitals Min/Max Last 24 Hours:  Temp  Min: 36.4 °C (97.5 °F)  Max: 37.5 °C (99.5 °F)  Pulse  Min: 73  Max: 101  Resp  Min: 16  Max: 18  BP  Min: 121/62  Max: 155/77    Intake/Output:   No intake or output data in the 24 hours ending 24 1131    Physical Exam:  General: Examination reveals a well developed, well nourished, female, in no acute distress. She is alert and cooperative.  HEENT: External ears normal. Nose normal, no erythema or discharge. Mouth and throat clear.  Neck: supple, no significant adenopathy.  Lungs: normal effort.  Cardiac: regular rate.  Abdomen: soft, nontender, nondistended, incision c/d/i  Extremities: no limitation in range of motion.  Neurological: alert, oriented, normal speech, no focal findings or movement disorder noted.  Psychological: awake and alert; oriented to person, place, and time.    Lab Data:  Labs in chart were reviewed.

## 2024-01-04 NOTE — SIGNIFICANT EVENT
Elevated Bps    BPs uptrending overnight. Will uptitrate to labetalol 800 mg BID with first dose to be given now. Continue BP obs.    Victor Hugo Sosa MD  Labor and Delivery

## 2024-01-04 NOTE — CARE PLAN
Patient is progressing through goals. Pain is improving, headache has subsided, and lochia is light.     Problem: Postpartum  Goal: Incisions, wounds, or drain sites healing without S/S of infection  Outcome: Progressing     Problem: Chronic Conditions and Co-morbidities  Goal: Patient's chronic conditions and co-morbidity symptoms are monitored and maintained or improved  Outcome: Progressing     Problem: Postpartum  Goal: Minimal s/sx of HDP and BP<160/110  Outcome: Progressing

## 2024-01-04 NOTE — CARE PLAN
The patient's goals for the shift include BPs remain <160/110 until end of shift    Problem: Hypertensive Disorder of Pregnancy (HDP)  Goal: Minimal s/sx of HDP and BP<160/110  Outcome: Progressing     Problem: Safety - Adult  Goal: Free from fall injury  Outcome: Progressing     Problem: Postpartum  Goal: Experiences normal postpartum course  Outcome: Progressing  Goal: Appropriate maternal -  bonding  Outcome: Progressing  Goal: Establish and maintain infant feeding pattern for adequate nutrition  Outcome: Progressing  Goal: Incisions, wounds, or drain sites healing without S/S of infection  Outcome: Progressing  Goal: No s/sx infection  Outcome: Progressing  Goal: Minimal s/sx of HDP and BP<160/110  Outcome: Progressing     Problem: Pain - Adult  Goal: Verbalizes/displays adequate comfort level or baseline comfort level  Outcome: Progressing     Problem: Chronic Conditions and Co-morbidities  Goal: Patient's chronic conditions and co-morbidity symptoms are monitored and maintained or improved  Outcome: Progressing

## 2024-01-05 ENCOUNTER — APPOINTMENT (OUTPATIENT)
Dept: OBSTETRICS AND GYNECOLOGY | Facility: CLINIC | Age: 34
End: 2024-01-05
Payer: COMMERCIAL

## 2024-01-05 VITALS
WEIGHT: 167.55 LBS | SYSTOLIC BLOOD PRESSURE: 146 MMHG | HEART RATE: 81 BPM | HEIGHT: 61 IN | BODY MASS INDEX: 31.63 KG/M2 | OXYGEN SATURATION: 96 % | RESPIRATION RATE: 16 BRPM | DIASTOLIC BLOOD PRESSURE: 88 MMHG | TEMPERATURE: 99.1 F

## 2024-01-05 PROCEDURE — 99238 HOSP IP/OBS DSCHRG MGMT 30/<: CPT | Performed by: STUDENT IN AN ORGANIZED HEALTH CARE EDUCATION/TRAINING PROGRAM

## 2024-01-05 PROCEDURE — 2500000001 HC RX 250 WO HCPCS SELF ADMINISTERED DRUGS (ALT 637 FOR MEDICARE OP): Performed by: STUDENT IN AN ORGANIZED HEALTH CARE EDUCATION/TRAINING PROGRAM

## 2024-01-05 PROCEDURE — 2500000005 HC RX 250 GENERAL PHARMACY W/O HCPCS: Performed by: STUDENT IN AN ORGANIZED HEALTH CARE EDUCATION/TRAINING PROGRAM

## 2024-01-05 RX ORDER — LABETALOL 200 MG/1
800 TABLET, FILM COATED ORAL 2 TIMES DAILY
Qty: 240 TABLET | Refills: 0 | Status: SHIPPED | OUTPATIENT
Start: 2024-01-05 | End: 2024-02-06

## 2024-01-05 RX ORDER — ONDANSETRON 4 MG/1
4 TABLET, FILM COATED ORAL EVERY 12 HOURS PRN
Qty: 10 TABLET | Refills: 0 | Status: SHIPPED | OUTPATIENT
Start: 2024-01-05

## 2024-01-05 RX ADMIN — ACETAMINOPHEN 975 MG: 325 TABLET ORAL at 10:53

## 2024-01-05 RX ADMIN — ACETAMINOPHEN 975 MG: 325 TABLET ORAL at 05:58

## 2024-01-05 RX ADMIN — ACETAMINOPHEN 975 MG: 325 TABLET ORAL at 00:27

## 2024-01-05 RX ADMIN — SIMETHICONE 80 MG: 80 TABLET, CHEWABLE ORAL at 08:16

## 2024-01-05 RX ADMIN — ONDANSETRON HYDROCHLORIDE 4 MG: 4 TABLET, FILM COATED ORAL at 06:02

## 2024-01-05 RX ADMIN — LABETALOL HYDROCHLORIDE 800 MG: 200 TABLET, FILM COATED ORAL at 05:58

## 2024-01-05 RX ADMIN — LOPERAMIDE HYDROCHLORIDE 4 MG: 2 CAPSULE ORAL at 09:54

## 2024-01-05 ASSESSMENT — PAIN SCALES - GENERAL
PAINLEVEL_OUTOF10: 3
PAINLEVEL_OUTOF10: 4

## 2024-01-05 NOTE — DISCHARGE SUMMARY
Discharge Summary    Admission Date: 1/2/2024  Discharge Date: 1/5/23    Discharge Diagnosis  Gestational hypertension, unspecified trimester    Hospital Course  Pt was readmitted on POD#7 in the s/o newly diagnosed sPEC, diagnosed by severe range BP that required IV tx. HA on arrival, resolved. HELLP labs negative. She was started on labetalol that was uptitrated to 800 mg BID and monitored with adequate BP control. She was discharged home with BP cuff and plan for 3-5 day BP check outpatient.    Pertinent Physical Exam At Time of Discharge  Physical Exam  Constitutional:       General: She is not in acute distress.     Appearance: Normal appearance.   HENT:      Head: Normocephalic and atraumatic.      Nose: Nose normal.   Eyes:      General: No scleral icterus.  Cardiovascular:      Rate and Rhythm: Normal rate.   Pulmonary:      Effort: Pulmonary effort is normal.   Abdominal:      Palpations: Abdomen is soft.   Musculoskeletal:         General: Normal range of motion.      Cervical back: Normal range of motion.   Skin:     General: Skin is warm and dry.   Neurological:      General: No focal deficit present.      Mental Status: She is alert and oriented to person, place, and time.   Psychiatric:         Mood and Affect: Mood normal.         Behavior: Behavior normal.       Discharge Meds     Your medication list        START taking these medications        Instructions Last Dose Given Next Dose Due   labetalol 200 mg tablet  Commonly known as: Normodyne      Take 4 tablets (800 mg) by mouth 2 times a day.       ondansetron 4 mg tablet  Commonly known as: Zofran      Take 1 tablet (4 mg) by mouth every 12 hours if needed for nausea or vomiting.              CONTINUE taking these medications        Instructions Last Dose Given Next Dose Due   acetaminophen 325 mg tablet  Commonly known as: Tylenol      Take 3 tablets (975 mg) by mouth every 6 hours.       ergocalciferol 1.25 MG (49256 UT) capsule  Commonly known  as: Vitamin D-2            mg tablet  Generic drug: ibuprofen      Take 1 tablet (600 mg) by mouth every 6 hours.       oxyCODONE 5 mg immediate release tablet  Commonly known as: Roxicodone      Take 1 tablet (5 mg) by mouth every 4 hours if needed (Pain score 6-8).       polyethylene glycol 17 gram packet  Commonly known as: Glycolax, Miralax      Take 17 g (1 packet dissolved in 8 ounces of water or clear juice)  by mouth 2 times a day as needed (first line).       Prenatal 27 mg iron-800 mcg folic acid tablet  Generic drug: prenatal vitamin (iron-folic)           PRENATAL ORAL                     Where to Get Your Medications        These medications were sent to [facility]/pharmacy #[id number] - [place] - [address] [address]  [address] [facility] [facility] [place] [address]      Phone: [phone]   labetalol 200 mg tablet  ondansetron 4 mg tablet          Test Results Pending At Discharge  Pending Labs       Order Current Status    POCT UA (nonautomated) manually resulted In process            Outpatient Follow-Up  Future Appointments   Date Time Provider Department Center   1/8/2024  2:00 PM Candi Lara MD [id number]       Discussed and seen with Dr. Sherie Lemus MD    Attending attestation:   I saw and evaluated the patient. I personally obtained the key and critical portions of the history and physical exam or was physically present for key and critical portions performed by the resident/CARISSA. I reviewed the resident/CARISSA's documentation and discussed the patient with the resident/CARISSA. I agree with the resident/CARISSA's medical decision making as documented in the note.     Rhianna Rehman MD  OBGYN Attending

## 2024-01-05 NOTE — CARE PLAN
The patient's goals for the shift include BPs remain <160/110 until end of shift    Problem: Hypertensive Disorder of Pregnancy (HDP)  Goal: Minimal s/sx of HDP and BP<160/110  Outcome: Met     Problem: Safety - Adult  Goal: Free from fall injury  Outcome: Met     Problem: Postpartum  Goal: Experiences normal postpartum course  Outcome: Met  Goal: Appropriate maternal -  bonding  Outcome: Met  Goal: Establish and maintain infant feeding pattern for adequate nutrition  Outcome: Met  Goal: Incisions, wounds, or drain sites healing without S/S of infection  Outcome: Met  Goal: Minimal s/sx of HDP and BP<160/110  Outcome: Met     Problem: Pain - Adult  Goal: Verbalizes/displays adequate comfort level or baseline comfort level  Outcome: Met     Problem: Chronic Conditions and Co-morbidities  Goal: Patient's chronic conditions and co-morbidity symptoms are monitored and maintained or improved  Outcome: Met

## 2024-01-08 ENCOUNTER — PATIENT OUTREACH (OUTPATIENT)
Dept: CARE COORDINATION | Facility: CLINIC | Age: 34
End: 2024-01-08

## 2024-01-08 ENCOUNTER — OFFICE VISIT (OUTPATIENT)
Dept: OBSTETRICS AND GYNECOLOGY | Facility: CLINIC | Age: 34
End: 2024-01-08
Payer: COMMERCIAL

## 2024-01-08 VITALS — DIASTOLIC BLOOD PRESSURE: 89 MMHG | SYSTOLIC BLOOD PRESSURE: 138 MMHG

## 2024-01-08 DIAGNOSIS — Z98.890 POST-OPERATIVE STATE: Primary | ICD-10-CM

## 2024-01-08 PROCEDURE — 1036F TOBACCO NON-USER: CPT | Performed by: OBSTETRICS & GYNECOLOGY

## 2024-01-08 PROCEDURE — 3075F SYST BP GE 130 - 139MM HG: CPT | Performed by: OBSTETRICS & GYNECOLOGY

## 2024-01-08 PROCEDURE — 3079F DIAST BP 80-89 MM HG: CPT | Performed by: OBSTETRICS & GYNECOLOGY

## 2024-01-08 PROCEDURE — 99024 POSTOP FOLLOW-UP VISIT: CPT | Performed by: OBSTETRICS & GYNECOLOGY

## 2024-01-08 NOTE — PROGRESS NOTES
Postpartum Progress Note    Assessment/Plan   Alba Mendez is a 33 y.o., , who delivered at 37w4d gestation and is now postpartum day 13.  The office blood pressure today is 138/89.  She has rare headache, no vision changes, no edema, no right upper quadrant pain.  The plan is to continue labetalol 800 mg twice daily.  She will continue to monitor her blood pressures.  She will follow-up in 4 weeks, her 6 weeks postpartum visit.      Active Problems:  There are no active Hospital Problems.    Pregnancy Problems (from 23 to present)       Problem Noted Resolved    Rubella non-immune status, antepartum 2023 by Tamara Beasley PA-C No    Priority:  Medium      PTSD (post-traumatic stress disorder) 2023 by Rosie Parekh No    Priority:  Medium      Major depressive disorder in remission (CMS/HCC) 2023 by Rosie Parekh No    Priority:  Medium      Preeclampsia in postpartum period 2024 by Melissa Lemus MD No    Postpartum headache 2024 by SAMANTHA Nixon-CNP No    Gestational hypertension, unspecified trimester 2024 by Jeni Rubio APRN-CNP No    Excessive fetal growth affecting management of pregnancy in third trimester 12/15/2023 by SAMANTHA Bravo-CNP 2023 by Candi Lara MD    Priority:  Medium      Overview Signed 12/15/2023  5:10 PM by SAMANTHA Bravo-CNP     -Already planning   -Increased interval growth on US today, prior AGA fetus  -Delivery planned via  38 wks          Previous  section complicating pregnancy 2023 by Candi Lara MD 2023 by Candi Lara MD    Priority:  Medium      Anxiety in pregnancy, antepartum 2023 by Rosie Parekh 2023 by Candi Lara MD    Priority:  Medium      Gestational diabetes 2023 by Rosie Parekh 2023 by Candi Lara MD    Priority:  Medium      Overview Addendum 12/15/2023  5:09 PM by Melisa French  APRN-CNP     -Shared Care with Dr. Lara  - Attended [facility]  - [facility] Consult completed  -MFM 36 wk visit (12/15)  -Serial growth ultrasounds starting at 28 weeks    Last ultrasound: 12/15- EFW 92%, AC 96%, BPP , normal ELINA    Fetal surveillance:  -Weekly at 32 weeks  -Twice weekly at 36 weeks    Current Regimen: NPH 16/16--> NPH 16/20* (12/15)-- with review of BG log 3 fastings were elevated or borderline.     Delivery Plan: 38wks (She is already scheduled for - 38.6wga with Dr. Lara)  Intrapartum:  GDM protocol  Postpartum: No medication    Recommend PP 2hr gtt and q3yr F/U with PCP for A1C and TSH           History of  delivery, currently pregnant in first trimester 2023 by Rosie Parekh 2023 by Candi Lara MD    Priority:  Medium      Panic attacks 2023 by Rosie Parekh 2023 by Candi Lara MD    Priority:  Medium      Postpartum depression 2023 by Rosie Parekh 2023 by Candi Lara MD    Priority:  Medium      Anemia, antepartum 2023 by Rosie Parekh 2023 by Candi Lara MD    Priority:  Medium      H/O gestational diabetes in prior pregnancy, currently pregnant 2023 by Rosie Parekh 2023 by Candi Lara MD    Priority:  Medium      Hypertension 2023 by Rosie Parekh 2024 by TOR Nixon    Priority:  Medium      Overview Signed 2024  8:10 PM by TOR Nixon     H/o HTN, now resolved. Last elevated BP 2021.               Hospital course: no complications   She had a repeat  on 2023 after presenting in labor, breech presentation.  She delivered a boy weighing 7 pounds 10 ounces.  She went home on postpartum day #3, 2023.  She then was readmitted 2024 due to elevated blood pressures and was diagnosed with severe pre-eclampsia.  She was discharged in 2024 on 800 mg of labetalol  twice daily.  She is bottlefeeding.  She is slight spotting.  She has no incisional concerns.  No dysuria or change in bowel habits.  She does monitor her blood pressures at home with a max BP of 155/94.  The office blood pressure today is 138/89.  She has occasional slight headache, no vision changes, no edema, no right upper quadrant pain.    Subjective   Her pain is well controlled with current medications  She is passing flatus  She is ambulating well  She is tolerating a No diet orders on file  She reports no breast or nursing problems  She denies emotional concerns today   Her plan for contraception is none     She has no dysuria, no change in bowel habits.  Her older daughter is present today.    Objective   Allergies:   Bee pollen, Ragweed, and Tree and shrub pollen         Last Vitals:  Temp Pulse Resp BP MAP Pulse Ox         138/89         Vitals Min/Max Last 24 Hours:  @FLOWSTAT(6,8,9,5,[id number]:24::1)@    Intake/Output:   [unfilled]    Physical Exam:  General: Examination reveals a well developed, well nourished, female, in no acute distress. She is alert and cooperative.  She is sitting comfortably on the table.  The incision is clean and intact.  No drainage.  Nontender.  Lower extremities are without edema or calf pain.  Abdomen is soft and nontender.  Lab Data:  Labs in chart were reviewed.

## 2024-02-05 ENCOUNTER — POSTPARTUM VISIT (OUTPATIENT)
Dept: OBSTETRICS AND GYNECOLOGY | Facility: CLINIC | Age: 34
End: 2024-02-05
Payer: COMMERCIAL

## 2024-02-05 DIAGNOSIS — Z30.41 ENCOUNTER FOR SURVEILLANCE OF CONTRACEPTIVE PILLS: Primary | ICD-10-CM

## 2024-02-05 PROCEDURE — 0503F POSTPARTUM CARE VISIT: CPT | Performed by: OBSTETRICS & GYNECOLOGY

## 2024-02-05 RX ORDER — NORETHINDRONE 0.35 MG/1
1 TABLET ORAL DAILY
Qty: 28 TABLET | Refills: 11 | Status: SHIPPED | OUTPATIENT
Start: 2024-02-05 | End: 2025-02-04

## 2024-02-05 ASSESSMENT — EDINBURGH POSTNATAL DEPRESSION SCALE (EPDS)
I HAVE BEEN SO UNHAPPY THAT I HAVE HAD DIFFICULTY SLEEPING: YES, SOMETIMES
I HAVE BEEN ABLE TO LAUGH AND SEE THE FUNNY SIDE OF THINGS: NOT QUITE SO MUCH NOW
I HAVE FELT SAD OR MISERABLE: NOT VERY OFTEN
I HAVE BLAMED MYSELF UNNECESSARILY WHEN THINGS WENT WRONG: NO, NEVER
I HAVE BEEN SO UNHAPPY THAT I HAVE BEEN CRYING: ONLY OCCASIONALLY
I HAVE FELT SCARED OR PANICKY FOR NO GOOD REASON: NO, NOT AT ALL
I HAVE LOOKED FORWARD WITH ENJOYMENT TO THINGS: DEFINITELY LESS THAN I USED TO
THE THOUGHT OF HARMING MYSELF HAS OCCURRED TO ME: NEVER
THINGS HAVE BEEN GETTING ON TOP OF ME: YES, SOMETIMES I HAVEN'T BEEN COPING AS WELL AS USUAL
TOTAL SCORE: 11
I HAVE BEEN ANXIOUS OR WORRIED FOR NO GOOD REASON: YES, SOMETIMES

## 2024-02-06 VITALS — WEIGHT: 163 LBS | BODY MASS INDEX: 30.8 KG/M2 | DIASTOLIC BLOOD PRESSURE: 85 MMHG | SYSTOLIC BLOOD PRESSURE: 147 MMHG

## 2024-02-07 NOTE — PROGRESS NOTES
Postpartum Progress Note    Assessment/Plan   Alba Mendez is a 33 y.o., , who delivered at 37w4d gestation and is now postpartum day 42.  Plan is to begin progesterone only pills for contraception.  On exam her menses was seen, consistent with the resumption of cycles.  We did discuss returning in  for her annual exam and Pap smear.  Active Problems:  There are no active Hospital Problems.  She had a repeat  2023 due to labor, breech presentation.  Her son weighed 7 pounds 10 ounces.  He is here for the visit.  She was readmitted in 2024 with severe preeclampsia.  She was discharged on labetalol 800 mg twice daily but discontinued as she was feeling nauseous and had a headache.  Her blood pressure here is 147/85 which is similar to at home.  She has no incisional concerns.  There is no change in bowel habits, no dysuria, no vaginal discharge, no pelvic pain.  Pregnancy Problems (from 23 to present)       Problem Noted Resolved    Rubella non-immune status, antepartum 2023 by Tamara Beasley PA-C No    Priority:  Medium      PTSD (post-traumatic stress disorder) 2023 by Rosie Parekh No    Priority:  Medium      Major depressive disorder in remission (CMS/HCC) 2023 by Rosie Parekh No    Priority:  Medium      Preeclampsia in postpartum period 2024 by Melissa Lemus MD No    Postpartum headache 2024 by TOR Nixon No    Gestational hypertension, unspecified trimester 2024 by TOR Nixon No    Excessive fetal growth affecting management of pregnancy in third trimester 12/15/2023 by TOR Bravo 2023 by Candi Lara MD    Priority:  Medium      Overview Signed 12/15/2023  5:10 PM by TOR Bravo     -Already planning   -Increased interval growth on US today, prior AGA fetus  -Delivery planned via  38 wks          Previous  section  complicating pregnancy 2023 by Candi Lara MD 2023 by Candi Lara MD    Priority:  Medium      Anxiety in pregnancy, antepartum 2023 by Rosie Parekh 2023 by Candi Lara MD    Priority:  Medium      Gestational diabetes 2023 by Rosie Parekh 2023 by Candi Lara MD    Priority:  Medium      Overview Addendum 12/15/2023  5:09 PM by SAMANTHA Bravo-CNP     -Shared Care with Dr. Lara  - Attended [place]  - MF Consult completed  -MFM 36 wk visit (12/15)  -Serial growth ultrasounds starting at 28 weeks    Last ultrasound: 12/15- EFW 92%, AC 96%, BPP , normal ELINA    Fetal surveillance:  -Weekly at 32 weeks  -Twice weekly at 36 weeks    Current Regimen: NPH 16/16--> NPH 16/20* (12/15)-- with review of BG log 37 fastings were elevated or borderline.     Delivery Plan: 38wks (She is already scheduled for - 38.6wga with Dr. Lara)  Intrapartum:  GDM protocol  Postpartum: No medication    Recommend PP 2hr gtt and q3yr F/U with PCP for A1C and TSH           History of  delivery, currently pregnant in first trimester 2023 by Rosie Parekh 2023 by Candi Lara MD    Priority:  Medium      Panic attacks 2023 by Rosie Parekh 2023 by Candi Lara MD    Priority:  Medium      Postpartum depression 2023 by Rosie Parekh 2023 by Candi Lara MD    Priority:  Medium      Anemia, antepartum 2023 by Rosie Parekh 2023 by Candi Lara MD    Priority:  Medium      H/O gestational diabetes in prior pregnancy, currently pregnant 2023 by Rosie Parekh 2023 by Candi Lara MD    Priority:  Medium      Hypertension 2023 by Rosie Parekh 2024 by TOR Nixon    Priority:  Medium      Overview Signed 2024  8:10 PM by TRO Nixon     H/o HTN, now resolved. Last elevated BP 2021.                Hospital course: postpartum preeclampsia/eclampsia   Her last Pap August 10, 2021 was negative, high risk HPV negative.    Subjective   Her pain is well controlled with current medications  She is passing flatus  She is ambulating well  She is tolerating a No diet orders on file  She reports no breast or nursing problems  She denies emotional concerns and reports mild depression today   Her plan for contraception is progesterone only pills.     She denies headache no edema.  She has a nurse practitioner for counseling and denies needing medication.  There is no HI/SI    Objective   Allergies:   Bee pollen, Ragweed, and Tree and shrub pollen         Last Vitals:  Temp Pulse Resp BP MAP Pulse Ox         147/85         Vitals Min/Max Last 24 Hours:  @FLOWSTAT(6,8,9,5,[id number]:24::1)@    Intake/Output:   [unfilled]    Physical Exam:  General: Examination reveals a well developed, well nourished, female, in no acute distress. She is alert and cooperative.  Incision is clean, dry and intact.  Nontender.  External genitalia are normal.  On speculum exam the cervix and vagina are normal.  On bimanual exam the uterus is nontender, no masses.  Lab Data:  Labs in chart were reviewed.

## 2024-02-09 ENCOUNTER — PATIENT OUTREACH (OUTPATIENT)
Dept: CARE COORDINATION | Facility: CLINIC | Age: 34
End: 2024-02-09
Payer: COMMERCIAL

## 2024-02-09 NOTE — PROGRESS NOTES
[facility] 30 day follow up:  Alba reports she is OK but Bps are not where she'd like them to be.  Saw OB/GYN 2/5 who is managing her medical care.  Discussed [facility] dietitian to which she agreed.  Referral placed

## 2024-02-13 ENCOUNTER — PATIENT OUTREACH (OUTPATIENT)
Dept: CARE COORDINATION | Facility: CLINIC | Age: 34
End: 2024-02-13
Payer: COMMERCIAL

## 2024-02-20 ENCOUNTER — PATIENT OUTREACH (OUTPATIENT)
Dept: CARE COORDINATION | Facility: CLINIC | Age: 34
End: 2024-02-20
Payer: COMMERCIAL

## 2024-02-26 ENCOUNTER — PATIENT OUTREACH (OUTPATIENT)
Dept: CARE COORDINATION | Facility: CLINIC | Age: 34
End: 2024-02-26
Payer: COMMERCIAL

## 2024-05-09 ENCOUNTER — TELEPHONE (OUTPATIENT)
Dept: OBSTETRICS AND GYNECOLOGY | Facility: CLINIC | Age: 34
End: 2024-05-09
Payer: COMMERCIAL

## 2024-05-12 NOTE — TELEPHONE ENCOUNTER
I reviewed her chart.  She delivered in December 2023, and was seen in February 2024 for her postpartum visit.  The plan was a progesterone only pill, due to history of preeclampsia.  If her cycles are heavy, I recommend 600 mg of ibuprofen early in her cycle this will help with cramps and heavy flow.  An IUD could be a good option for the most reduction in her menses.  I will have office staff contact her to clarify her concerns consider if appointment is needed.

## 2024-05-14 NOTE — TELEPHONE ENCOUNTER
Pt advised of message and would like to speak with you personally about some personal questions she has. Please advise thank you very much  Va Izquierdo MA

## 2024-05-22 NOTE — TELEPHONE ENCOUNTER
Talked to the patient.  By her LMP she states that she is now 8 weeks pregnant.  It was an unplanned conception.  She had a  in 2023.  She is considering termination and wants to know what to do.  I did recommend calling  or [facility] for further management.  If she decides to keep the pregnancy she will call back to schedule an OB visit.

## 2024-06-05 ENCOUNTER — SOCIAL WORK (OUTPATIENT)
Dept: BEHAVIORAL HEALTH | Facility: CLINIC | Age: 34
End: 2024-06-05
Payer: COMMERCIAL

## 2024-06-05 PROCEDURE — 90837 PSYTX W PT 60 MINUTES: CPT | Performed by: SOCIAL WORKER

## 2024-06-05 NOTE — PROGRESS NOTES
Televideo Informed Consent for psychological evaluation was reviewed with the patient as follows:     There are potential benefits and risks of the use of telephone or video-conferencing that differ from in-person sessions. Specifically, the telephone or televideo system we are using may not be HIPPA compliant and may present limits to patient confidentiality. Confidentiality still applies for telepsychology services, and nobody will record the session without your permission.    Understanding and verbal agreement was attested to by the patient. Patient identity was confirmed using 3 sources, including telephone number, email address and date of birth. Provision of services via telehealth was necessitated by the restrictions on face-to-face visits accompanying the COVID-19 pandemic.     SECURE NOTE:  This document may not be released or reproduced in any form without the consent of either the Provider, the Provider´s  or the Chair of the Department of Psychiatry. This prohibition includes copying the document into any non-Restricted area of the Ambulatory Electronic Medical Record.      Start time : 11:03 am  End time : 12:00 pm    Diagnoses : post partum depression - It was a very hard pregnancy and a C section     Clients current issues: Client had an  last Friday. They have a 5 month old. Kylee 5 years, Alea 13 -  doesn't believe in   Loc is a workaholic.  Alba is asking for him to hug her and attend to her each day.   Loc shared that they argue about Alea who Alba protects and is permissive at times with her.     Treatment interventions: Helped client and Loc express their feelings.  Helped them communicate better.   He agreed to take a couple days off and get some sleep.   They both agreed to talk daily at 9:00.     Prognosis: good     Treatment plan update: We established new goals to increase their communication and improve their relationship.      Continue  weekly ongoing psychotherapy.

## 2024-07-03 ENCOUNTER — APPOINTMENT (OUTPATIENT)
Dept: BEHAVIORAL HEALTH | Facility: CLINIC | Age: 34
End: 2024-07-03
Payer: COMMERCIAL

## 2024-07-03 DIAGNOSIS — F41.1 ANXIETY, GENERALIZED: ICD-10-CM

## 2024-07-03 PROCEDURE — 90837 PSYTX W PT 60 MINUTES: CPT | Performed by: SOCIAL WORKER

## 2024-07-03 NOTE — PROGRESS NOTES
Televideo Informed Consent for psychological evaluation was reviewed with the patient as follows:     There are potential benefits and risks of the use of telephone or video-conferencing that differ from in-person sessions. Specifically, the telephone or televideo system we are using may not be HIPPA compliant and may present limits to patient confidentiality. Confidentiality still applies for telepsychology services, and nobody will record the session without your permission.    Understanding and verbal agreement was attested to by the patient. Patient identity was confirmed using 3 sources, including telephone number, email address and date of birth. Provision of services via telehealth was necessitated by the restrictions on face-to-face visits accompanying the COVID-19 pandemic.     SECURE NOTE:  This document may not be released or reproduced in any form without the consent of either the Provider, the Provider´s  or the Chair of the Department of Psychiatry. This prohibition includes copying the document into any non-Restricted area of the Ambulatory Electronic Medical Record.      Start time : 11:04 am  End time :  12:00 pm    Diagnoses : JANY, MDD    Clients current issues: Client sees [place] being irritable and frustrated.   [place] has been in [place]. This has been a reprieve in the household.   He is still working 16 hrs day.   Loc is overwhelmed with bills, work, taxes, kids - He is a middle manager     Treatment interventions: Helped couple facilitate a discussion.  They are both frustrated.   His job is awful. She wants to be more of a priority.   Sabrina's heart is so big - he wants to heal the world. Explored this as a problem in his life and the marriage.  She feels like she doesn't matter.   I suggested he needs a job change.    I asked them to have weekly discussions on their needs.   I suggested weekly date nights.     Prognosis: good    Treatment plan update: Client is invested in  therapy.      Continue weekly ongoing psychotherapy.

## 2024-08-26 ENCOUNTER — APPOINTMENT (OUTPATIENT)
Dept: RADIOLOGY | Facility: HOSPITAL | Age: 34
End: 2024-08-26
Payer: COMMERCIAL

## 2024-08-26 ENCOUNTER — HOSPITAL ENCOUNTER (EMERGENCY)
Facility: HOSPITAL | Age: 34
Discharge: HOME | End: 2024-08-26
Payer: COMMERCIAL

## 2024-08-26 ENCOUNTER — CLINICAL SUPPORT (OUTPATIENT)
Dept: EMERGENCY MEDICINE | Facility: HOSPITAL | Age: 34
End: 2024-08-26
Payer: COMMERCIAL

## 2024-08-26 VITALS
HEIGHT: 61 IN | HEART RATE: 64 BPM | RESPIRATION RATE: 16 BRPM | TEMPERATURE: 96.8 F | BODY MASS INDEX: 30.21 KG/M2 | SYSTOLIC BLOOD PRESSURE: 129 MMHG | DIASTOLIC BLOOD PRESSURE: 79 MMHG | WEIGHT: 160 LBS | OXYGEN SATURATION: 99 %

## 2024-08-26 DIAGNOSIS — K58.0 IRRITABLE BOWEL SYNDROME WITH DIARRHEA: Primary | ICD-10-CM

## 2024-08-26 LAB
ALBUMIN SERPL BCP-MCNC: 5.1 G/DL (ref 3.4–5)
ALP SERPL-CCNC: 60 U/L (ref 33–110)
ALT SERPL W P-5'-P-CCNC: 16 U/L (ref 7–45)
ANION GAP SERPL CALC-SCNC: 13 MMOL/L (ref 10–20)
APPEARANCE UR: CLEAR
AST SERPL W P-5'-P-CCNC: 13 U/L (ref 9–39)
ATRIAL RATE: 61 BPM
B-HCG SERPL-ACNC: <3 MIU/ML
BASOPHILS # BLD AUTO: 0.04 X10*3/UL (ref 0–0.1)
BASOPHILS NFR BLD AUTO: 0.6 %
BILIRUB SERPL-MCNC: 0.4 MG/DL (ref 0–1.2)
BILIRUB UR STRIP.AUTO-MCNC: NEGATIVE MG/DL
BUN SERPL-MCNC: 15 MG/DL (ref 6–23)
CALCIUM SERPL-MCNC: 9.8 MG/DL (ref 8.6–10.6)
CHLORIDE SERPL-SCNC: 108 MMOL/L (ref 98–107)
CO2 SERPL-SCNC: 20 MMOL/L (ref 21–32)
COLOR UR: NORMAL
CREAT SERPL-MCNC: 0.7 MG/DL (ref 0.5–1.05)
EGFRCR SERPLBLD CKD-EPI 2021: >90 ML/MIN/1.73M*2
EOSINOPHIL # BLD AUTO: 0.02 X10*3/UL (ref 0–0.7)
EOSINOPHIL NFR BLD AUTO: 0.3 %
ERYTHROCYTE [DISTWIDTH] IN BLOOD BY AUTOMATED COUNT: 13.7 % (ref 11.5–14.5)
GLUCOSE SERPL-MCNC: 113 MG/DL (ref 74–99)
GLUCOSE UR STRIP.AUTO-MCNC: NORMAL MG/DL
HCT VFR BLD AUTO: 39.1 % (ref 36–46)
HGB BLD-MCNC: 13.3 G/DL (ref 12–16)
HOLD SPECIMEN: NORMAL
IMM GRANULOCYTES # BLD AUTO: 0.01 X10*3/UL (ref 0–0.7)
IMM GRANULOCYTES NFR BLD AUTO: 0.2 % (ref 0–0.9)
KETONES UR STRIP.AUTO-MCNC: NEGATIVE MG/DL
LEUKOCYTE ESTERASE UR QL STRIP.AUTO: NEGATIVE
LIPASE SERPL-CCNC: 21 U/L (ref 9–82)
LYMPHOCYTES # BLD AUTO: 1.4 X10*3/UL (ref 1.2–4.8)
LYMPHOCYTES NFR BLD AUTO: 22 %
MAGNESIUM SERPL-MCNC: 2.25 MG/DL (ref 1.6–2.4)
MCH RBC QN AUTO: 29.8 PG (ref 26–34)
MCHC RBC AUTO-ENTMCNC: 34 G/DL (ref 32–36)
MCV RBC AUTO: 88 FL (ref 80–100)
MONOCYTES # BLD AUTO: 0.32 X10*3/UL (ref 0.1–1)
MONOCYTES NFR BLD AUTO: 5 %
NEUTROPHILS # BLD AUTO: 4.56 X10*3/UL (ref 1.2–7.7)
NEUTROPHILS NFR BLD AUTO: 71.9 %
NITRITE UR QL STRIP.AUTO: NEGATIVE
NRBC BLD-RTO: 0 /100 WBCS (ref 0–0)
P AXIS: 60 DEGREES
P OFFSET: 182 MS
P ONSET: 129 MS
PH UR STRIP.AUTO: 5 [PH]
PLATELET # BLD AUTO: 259 X10*3/UL (ref 150–450)
POTASSIUM SERPL-SCNC: 3.8 MMOL/L (ref 3.5–5.3)
PR INTERVAL: 178 MS
PREGNANCY TEST URINE, POC: NEGATIVE
PROT SERPL-MCNC: 8 G/DL (ref 6.4–8.2)
PROT UR STRIP.AUTO-MCNC: NEGATIVE MG/DL
Q ONSET: 218 MS
QRS COUNT: 10 BEATS
QRS DURATION: 84 MS
QT INTERVAL: 376 MS
QTC CALCULATION(BAZETT): 378 MS
QTC FREDERICIA: 377 MS
R AXIS: 27 DEGREES
RBC # BLD AUTO: 4.47 X10*6/UL (ref 4–5.2)
RBC # UR STRIP.AUTO: NEGATIVE /UL
SODIUM SERPL-SCNC: 137 MMOL/L (ref 136–145)
SP GR UR STRIP.AUTO: 1.01
T AXIS: -3 DEGREES
T OFFSET: 406 MS
UROBILINOGEN UR STRIP.AUTO-MCNC: NORMAL MG/DL
VENTRICULAR RATE: 61 BPM
WBC # BLD AUTO: 6.4 X10*3/UL (ref 4.4–11.3)

## 2024-08-26 PROCEDURE — 83690 ASSAY OF LIPASE: CPT

## 2024-08-26 PROCEDURE — 85025 COMPLETE CBC W/AUTO DIFF WBC: CPT

## 2024-08-26 PROCEDURE — 96375 TX/PRO/DX INJ NEW DRUG ADDON: CPT

## 2024-08-26 PROCEDURE — 81025 URINE PREGNANCY TEST: CPT

## 2024-08-26 PROCEDURE — 2500000004 HC RX 250 GENERAL PHARMACY W/ HCPCS (ALT 636 FOR OP/ED): Mod: SE

## 2024-08-26 PROCEDURE — 74177 CT ABD & PELVIS W/CONTRAST: CPT

## 2024-08-26 PROCEDURE — 80053 COMPREHEN METABOLIC PANEL: CPT

## 2024-08-26 PROCEDURE — 96374 THER/PROPH/DIAG INJ IV PUSH: CPT

## 2024-08-26 PROCEDURE — 96361 HYDRATE IV INFUSION ADD-ON: CPT

## 2024-08-26 PROCEDURE — 81003 URINALYSIS AUTO W/O SCOPE: CPT

## 2024-08-26 PROCEDURE — 74177 CT ABD & PELVIS W/CONTRAST: CPT | Performed by: RADIOLOGY

## 2024-08-26 PROCEDURE — 93005 ELECTROCARDIOGRAM TRACING: CPT

## 2024-08-26 PROCEDURE — 84702 CHORIONIC GONADOTROPIN TEST: CPT

## 2024-08-26 PROCEDURE — 2550000001 HC RX 255 CONTRASTS: Mod: SE

## 2024-08-26 PROCEDURE — 36415 COLL VENOUS BLD VENIPUNCTURE: CPT

## 2024-08-26 PROCEDURE — 2500000001 HC RX 250 WO HCPCS SELF ADMINISTERED DRUGS (ALT 637 FOR MEDICARE OP): Mod: SE

## 2024-08-26 PROCEDURE — 83735 ASSAY OF MAGNESIUM: CPT

## 2024-08-26 PROCEDURE — 99285 EMERGENCY DEPT VISIT HI MDM: CPT | Mod: 25

## 2024-08-26 RX ORDER — MORPHINE SULFATE 4 MG/ML
4 INJECTION INTRAVENOUS ONCE
Status: COMPLETED | OUTPATIENT
Start: 2024-08-26 | End: 2024-08-26

## 2024-08-26 RX ORDER — ACETAMINOPHEN 325 MG/1
975 TABLET ORAL ONCE
Status: COMPLETED | OUTPATIENT
Start: 2024-08-26 | End: 2024-08-26

## 2024-08-26 RX ORDER — ONDANSETRON HYDROCHLORIDE 2 MG/ML
4 INJECTION, SOLUTION INTRAVENOUS ONCE
Status: COMPLETED | OUTPATIENT
Start: 2024-08-26 | End: 2024-08-26

## 2024-08-26 RX ORDER — DICYCLOMINE HYDROCHLORIDE 20 MG/1
20 TABLET ORAL 2 TIMES DAILY
Qty: 20 TABLET | Refills: 0 | Status: SHIPPED | OUTPATIENT
Start: 2024-08-26 | End: 2024-09-05

## 2024-08-26 RX ORDER — DICYCLOMINE HYDROCHLORIDE 10 MG/1
10 CAPSULE ORAL ONCE
Status: COMPLETED | OUTPATIENT
Start: 2024-08-26 | End: 2024-08-26

## 2024-08-26 RX ORDER — ONDANSETRON 4 MG/1
4 TABLET, ORALLY DISINTEGRATING ORAL EVERY 8 HOURS PRN
Qty: 20 TABLET | Refills: 0 | Status: SHIPPED | OUTPATIENT
Start: 2024-08-26 | End: 2024-09-02

## 2024-08-26 ASSESSMENT — COLUMBIA-SUICIDE SEVERITY RATING SCALE - C-SSRS
1. IN THE PAST MONTH, HAVE YOU WISHED YOU WERE DEAD OR WISHED YOU COULD GO TO SLEEP AND NOT WAKE UP?: NO
2. HAVE YOU ACTUALLY HAD ANY THOUGHTS OF KILLING YOURSELF?: NO
2. HAVE YOU ACTUALLY HAD ANY THOUGHTS OF KILLING YOURSELF?: NO
6. HAVE YOU EVER DONE ANYTHING, STARTED TO DO ANYTHING, OR PREPARED TO DO ANYTHING TO END YOUR LIFE?: NO
6. HAVE YOU EVER DONE ANYTHING, STARTED TO DO ANYTHING, OR PREPARED TO DO ANYTHING TO END YOUR LIFE?: NO
1. IN THE PAST MONTH, HAVE YOU WISHED YOU WERE DEAD OR WISHED YOU COULD GO TO SLEEP AND NOT WAKE UP?: NO

## 2024-08-26 ASSESSMENT — PAIN - FUNCTIONAL ASSESSMENT: PAIN_FUNCTIONAL_ASSESSMENT: 0-10

## 2024-08-26 ASSESSMENT — LIFESTYLE VARIABLES
EVER FELT BAD OR GUILTY ABOUT YOUR DRINKING: NO
TOTAL SCORE: 0
HAVE PEOPLE ANNOYED YOU BY CRITICIZING YOUR DRINKING: NO
HAVE YOU EVER FELT YOU SHOULD CUT DOWN ON YOUR DRINKING: NO
EVER HAD A DRINK FIRST THING IN THE MORNING TO STEADY YOUR NERVES TO GET RID OF A HANGOVER: NO

## 2024-08-26 ASSESSMENT — PAIN SCALES - GENERAL: PAINLEVEL_OUTOF10: 10 - WORST POSSIBLE PAIN

## 2024-08-26 ASSESSMENT — PAIN SCALES - PAIN ASSESSMENT IN ADVANCED DEMENTIA (PAINAD): TOTALSCORE: MEDICATION (SEE MAR)

## 2024-08-26 NOTE — ED TRIAGE NOTES
Patient presents to the ED for upper abdominal pain, x couple hours. Endorsing N/V. Denies chest pain, back pain, SOB

## 2024-08-26 NOTE — ED PROVIDER NOTES
Emergency Department Provider Note        History of Present Illness     History provided by: Patient  Limitations to History: None  External Records Reviewed with Brief Summary:  Medical chart review    HPI:  Alba Mendez is a 34 y.o. female  with a history significant for irritable bowel syndrome not currently on medication and 2 C-sections in the past no other surgeries who arrives to the emergency department with a chief complaint of new onset of diffuse abdominal pain mostly tender in the epigastrium that began at approximately 430 this a.m.  Patient endorses nausea with 2 episodes of emesis that was nonbloody, additionally she had endorses diarrheal illness since 430 this morning, does not know how much diarrhea she has had however states there was no blood noted.  She additionally states she has had no vaginal discharge no vaginal bleeding no concern for sexually transmitted diseases.  She states this feels like an exacerbation of her irritable bowel syndrome.  Additionally she does admit to daily marijuana use, states she has not used any marijuana today however denies cyclic vomiting.  Patient states she has no allergies to medications, no family history of inflammatory bowel disease such as ulcerative colitis or Crohn's disease no family history of colon cancer.  Patient does not have a primary care provider however has followed up with gastroenterology and had a colonoscopy performed approximately 3 to 4 years ago that was unremarkable per the patient's report.    Physical Exam   Triage vitals:  T 36 °C (96.8 °F)  HR 80  /90  RR 18  O2 100 % None (Room air)    General: Awake, alert, in no acute distress  Eyes: Gaze conjugate.  No scleral icterus or injection  HENT: Normo-cephalic, atraumatic. No stridor  CV: Regular rate, regular rhythm. Radial pulses 2+ bilaterally  Resp: Breathing non-labored, speaking in full sentences.  Clear to auscultation bilaterally  GI: Soft, non-distended,  non-tender  In most quadrants, mild tenderness to epigastric palpation, no suprapubic tenderness no CVA tenderness. No rebound or guarding.  :  deferred  MSK/Extremities: No gross bony deformities. Moving all extremities  Skin: Warm. Appropriate color  Neuro: Alert. Oriented. Face symmetric. Speech is fluent.  Gross strength and sensation intact in b/l UE and LEs  Psych: Appropriate mood and affect    Medical Decision Making & ED Course   Medical Decision Makin y.o. female  with an approximately 4-hour history of diarrheal illness with 2 episodes of nonbloody emes history  irritable bowel syndrome, diarrheal type, she states she feels like this is an exacerbation of her irritable bowel syndrome.  Patient has not taken any medication as of now.  A twelve-lead ECG to evaluate for epigastric pain, CBC,  CMP, lipase, magnesium to evaluate for electrolyte dyscrasia from fluid loss through diarrheal illness and emesis.  CT abdomen pelvis to evaluate for intra-abdominal pathology such as diverticulitis colitis, pancreatitis, cholecystitis.  Patient was given 4 mg of morphine for pain management, Zofran 4 mg for nausea, 1 L  normal saline for fluid resuscitation.  Please see ED course for further medical decision making.  ----      Differential diagnoses considered include but are not limited to:   Irritable bowel syndrome flareup, diverticulitis, colitis, gastritis, viral     Social Determinants of Health which Significantly Impact Care: None identified    EKG Independent Interpretation: EKG interpreted by myself. Please see ED Course for full interpretation.    Independent Result Review and Interpretation: Relevant laboratory and radiographic results were reviewed and independently interpreted by myself.  As necessary, they are commented on in the ED Course.    Chronic conditions affecting the patient's care: As documented above in MDM    The patient was discussed with the following consultants/services:  None    Care Considerations: As documented above in [facility]    ED Course:  ED Course as of 08/26/24 1411   Mon Aug 26, 2024   0831 Patient is a 34-year-old female with a history of IBS who presents emergency department for abdominal pain.  Around 4 AM this morning she was woken up by upper abdominal pain that she describes as crampy and sharp and intermittent.  Had 2 episodes of nonbloody nonbilious vomiting and has had copious amounts of diarrhea.  Endorses smoking marijuana daily.  Felt fine before going to bed.  Denies fevers, chills, chest pain, shortness of breath, dysuria.    General: Well-developed, well-nourished patient lying in bed who appears non-toxic.  Head: Atraumatic, normocephalic.  Eyes: Sclera anicteric.  ENT: Tacky mucous membranes  Heart: Regular rate and rhythm.  Lungs: Clear to auscultation bilaterally.  Normal respiratory pattern without conversational dyspnea or respiratory distress.  Abdomen: Soft, nondistended.  Mild epigastric and left upper quadrant tenderness.  No guarding or rebound.  Neurologic: Awake and alert, normal speech and mental status. Moves all extremities equally well. No focal deficits or lateralizing signs.  Psychiatric: Mood and affect appropriate.  Skin: Warm and dry, no appreciable rash.  Musculoskeletal: No peripheral edema.  [MAGALY]   0859 ECG 12 lead  Sinus rhythm with normal axis and intervals, no signs of acute ischemia [MAGALY]   0859 Twelve-lead ECG is interpreted by me shows normal sinus rhythm at a rate of 61 bpm, normal axis, , QTc 378, no acute injury pattern no high-grade AV block [PV]   1033 Urinalysis shows no signs of urinary tract infection, no blood in urine.  Beta-hCG was negative rule out pregnancy.  Magnesium was within normal limits no acute electrolyte dyscrasia noted euglycemic with a glucose of 113 normal renal hepatic function.  Lipase 21 within normal limits no signs of pancreatitis or pancreatic involvement.  WBC 6.4 no leukocytosis to indicate a  systemic infection, hemoglobin hematocrit stable 13.3/39.1, liver function tests and platelets within normal limits.  This time we are still pending CT abdomen pelvis. [PV]   1349 CT abdomen pelvis w IV contrast  Preliminary result:    IMPRESSION:  1.  There is wall thickening and submucosal edema of the ascending,  transverse, and descending colon consistent with pancolitis, with  findings suggestive of an acutely resolving or developing process..  2. There is minimal free fluid adjacent to the uterus likely  representing physiologic fluid in this patient with bilateral corpus  luteal cysts.  3. 0.9 cm simple cyst in the right renal lower pole.  4. Additional chronic findings as described above.   [PV]   1349 Call was placed to radiology department requesting final read of the CT.  Additionally patient given a dose of Bentyl in the emergency department as she states this usually helps with her pain.  Patient given a dose of Tylenol for pain and inflammation. [PV]   1405 CT abdomen pelvis w IV contrast  Final result:  IMPRESSION:  1. Findings are suggestive of multifocal colitis as described above,  given the presence of well-formed stool in the distal colon, this  could represent early or resolving colitis.  2. There is minimal free fluid adjacent to the uterus likely  representing physiologic fluid in this patient with bilateral corpus  luteal cysts.  3. 0.9 cm simple cyst in the right renal lower pole.  4. Additional chronic findings as described above.   [PV]   1411 We discussed follow-up precautions with the patient, she was instructed follow-up with primary care provider continue take Bentyl and Zofran as needed.  Patient understands plan of care, is arranging for safe ride home.  Patient is amenable to discharge.  Patient also given p.o. challenge with ginger ale, no emesis or abdominal pain afterwards.  Patient discharged home. [PV]      ED Course User Index  [MAGALY] Coreen Dubose MD  [PV] Gregory ALVARADO  DO Wiliam         Diagnoses as of 08/26/24 1411   Irritable bowel syndrome with diarrhea     Disposition   As a result of the work-up, the patient was discharged home.  she was informed of her diagnosis and instructed to come back with any concerns or worsening of condition.  she and was agreeable to the plan as discussed above.  she was given the opportunity to ask questions.  All of the patient's questions were answered.    Procedures   Procedures    Patient seen and discussed with ED attending physician.    Gregory Swain DO  Emergency Medicine     Gregory Swain DO  Resident  08/26/24 1411

## 2024-08-26 NOTE — DISCHARGE INSTRUCTIONS
Please follow-up with your primary care provider as soon as possible for further management of your irritable bowel symptoms.  You are being given a prescription for Bentyl, please take this as instructed as well as Zofran for nausea.  Please return to the emergency department if you are having multiple episodes of vomiting

## 2024-09-01 ENCOUNTER — CLINICAL SUPPORT (OUTPATIENT)
Dept: EMERGENCY MEDICINE | Facility: HOSPITAL | Age: 34
End: 2024-09-01
Payer: COMMERCIAL

## 2024-09-01 ENCOUNTER — HOSPITAL ENCOUNTER (EMERGENCY)
Facility: HOSPITAL | Age: 34
Discharge: HOME | End: 2024-09-01
Attending: EMERGENCY MEDICINE
Payer: COMMERCIAL

## 2024-09-01 ENCOUNTER — APPOINTMENT (OUTPATIENT)
Dept: RADIOLOGY | Facility: HOSPITAL | Age: 34
End: 2024-09-01
Payer: COMMERCIAL

## 2024-09-01 VITALS
SYSTOLIC BLOOD PRESSURE: 130 MMHG | BODY MASS INDEX: 30.21 KG/M2 | DIASTOLIC BLOOD PRESSURE: 81 MMHG | RESPIRATION RATE: 18 BRPM | OXYGEN SATURATION: 98 % | HEIGHT: 61 IN | HEART RATE: 68 BPM | WEIGHT: 160 LBS | TEMPERATURE: 97.3 F

## 2024-09-01 DIAGNOSIS — R11.2 NAUSEA AND VOMITING, UNSPECIFIED VOMITING TYPE: ICD-10-CM

## 2024-09-01 DIAGNOSIS — R10.9 ABDOMINAL PAIN, UNSPECIFIED ABDOMINAL LOCATION: Primary | ICD-10-CM

## 2024-09-01 LAB
ALBUMIN SERPL BCP-MCNC: 5.3 G/DL (ref 3.4–5)
ALP SERPL-CCNC: 63 U/L (ref 33–110)
ALT SERPL W P-5'-P-CCNC: 12 U/L (ref 7–45)
ANION GAP SERPL CALC-SCNC: 15 MMOL/L (ref 10–20)
APPEARANCE UR: CLEAR
AST SERPL W P-5'-P-CCNC: 11 U/L (ref 9–39)
B-HCG SERPL-ACNC: <3 MIU/ML
BASOPHILS # BLD AUTO: 0.03 X10*3/UL (ref 0–0.1)
BASOPHILS NFR BLD AUTO: 0.5 %
BILIRUB SERPL-MCNC: 0.4 MG/DL (ref 0–1.2)
BILIRUB UR STRIP.AUTO-MCNC: NEGATIVE MG/DL
BUN SERPL-MCNC: 14 MG/DL (ref 6–23)
CALCIUM SERPL-MCNC: 10 MG/DL (ref 8.6–10.6)
CHLORIDE SERPL-SCNC: 107 MMOL/L (ref 98–107)
CO2 SERPL-SCNC: 22 MMOL/L (ref 21–32)
COLOR UR: COLORLESS
CREAT SERPL-MCNC: 0.76 MG/DL (ref 0.5–1.05)
EGFRCR SERPLBLD CKD-EPI 2021: >90 ML/MIN/1.73M*2
EOSINOPHIL # BLD AUTO: 0.01 X10*3/UL (ref 0–0.7)
EOSINOPHIL NFR BLD AUTO: 0.2 %
ERYTHROCYTE [DISTWIDTH] IN BLOOD BY AUTOMATED COUNT: 13.2 % (ref 11.5–14.5)
GLUCOSE SERPL-MCNC: 130 MG/DL (ref 74–99)
GLUCOSE UR STRIP.AUTO-MCNC: NORMAL MG/DL
HCT VFR BLD AUTO: 37.6 % (ref 36–46)
HGB BLD-MCNC: 12.9 G/DL (ref 12–16)
HOLD SPECIMEN: NORMAL
IMM GRANULOCYTES # BLD AUTO: 0 X10*3/UL (ref 0–0.7)
IMM GRANULOCYTES NFR BLD AUTO: 0 % (ref 0–0.9)
KETONES UR STRIP.AUTO-MCNC: ABNORMAL MG/DL
LEUKOCYTE ESTERASE UR QL STRIP.AUTO: NEGATIVE
LIPASE SERPL-CCNC: 22 U/L (ref 9–82)
LYMPHOCYTES # BLD AUTO: 1.54 X10*3/UL (ref 1.2–4.8)
LYMPHOCYTES NFR BLD AUTO: 26.2 %
MCH RBC QN AUTO: 29.5 PG (ref 26–34)
MCHC RBC AUTO-ENTMCNC: 34.3 G/DL (ref 32–36)
MCV RBC AUTO: 86 FL (ref 80–100)
MONOCYTES # BLD AUTO: 0.34 X10*3/UL (ref 0.1–1)
MONOCYTES NFR BLD AUTO: 5.8 %
NEUTROPHILS # BLD AUTO: 3.95 X10*3/UL (ref 1.2–7.7)
NEUTROPHILS NFR BLD AUTO: 67.3 %
NITRITE UR QL STRIP.AUTO: NEGATIVE
NRBC BLD-RTO: 0 /100 WBCS (ref 0–0)
PH UR STRIP.AUTO: 6 [PH]
PLATELET # BLD AUTO: 281 X10*3/UL (ref 150–450)
POTASSIUM SERPL-SCNC: 4.1 MMOL/L (ref 3.5–5.3)
PROT SERPL-MCNC: 8.2 G/DL (ref 6.4–8.2)
PROT UR STRIP.AUTO-MCNC: NEGATIVE MG/DL
RBC # BLD AUTO: 4.38 X10*6/UL (ref 4–5.2)
RBC # UR STRIP.AUTO: NEGATIVE /UL
SODIUM SERPL-SCNC: 140 MMOL/L (ref 136–145)
SP GR UR STRIP.AUTO: 1.01
UROBILINOGEN UR STRIP.AUTO-MCNC: NORMAL MG/DL
WBC # BLD AUTO: 5.9 X10*3/UL (ref 4.4–11.3)

## 2024-09-01 PROCEDURE — 80053 COMPREHEN METABOLIC PANEL: CPT | Performed by: EMERGENCY MEDICINE

## 2024-09-01 PROCEDURE — 84702 CHORIONIC GONADOTROPIN TEST: CPT

## 2024-09-01 PROCEDURE — 2550000001 HC RX 255 CONTRASTS: Mod: SE | Performed by: EMERGENCY MEDICINE

## 2024-09-01 PROCEDURE — 74177 CT ABD & PELVIS W/CONTRAST: CPT | Performed by: STUDENT IN AN ORGANIZED HEALTH CARE EDUCATION/TRAINING PROGRAM

## 2024-09-01 PROCEDURE — 96361 HYDRATE IV INFUSION ADD-ON: CPT

## 2024-09-01 PROCEDURE — 96374 THER/PROPH/DIAG INJ IV PUSH: CPT | Mod: 59

## 2024-09-01 PROCEDURE — 99285 EMERGENCY DEPT VISIT HI MDM: CPT | Mod: 25

## 2024-09-01 PROCEDURE — 96375 TX/PRO/DX INJ NEW DRUG ADDON: CPT

## 2024-09-01 PROCEDURE — 2500000004 HC RX 250 GENERAL PHARMACY W/ HCPCS (ALT 636 FOR OP/ED): Mod: SE

## 2024-09-01 PROCEDURE — 85025 COMPLETE CBC W/AUTO DIFF WBC: CPT | Performed by: STUDENT IN AN ORGANIZED HEALTH CARE EDUCATION/TRAINING PROGRAM

## 2024-09-01 PROCEDURE — 93005 ELECTROCARDIOGRAM TRACING: CPT

## 2024-09-01 PROCEDURE — 83690 ASSAY OF LIPASE: CPT

## 2024-09-01 PROCEDURE — 80053 COMPREHEN METABOLIC PANEL: CPT | Performed by: STUDENT IN AN ORGANIZED HEALTH CARE EDUCATION/TRAINING PROGRAM

## 2024-09-01 PROCEDURE — 36415 COLL VENOUS BLD VENIPUNCTURE: CPT

## 2024-09-01 PROCEDURE — 74177 CT ABD & PELVIS W/CONTRAST: CPT

## 2024-09-01 PROCEDURE — 81003 URINALYSIS AUTO W/O SCOPE: CPT

## 2024-09-01 PROCEDURE — 2500000005 HC RX 250 GENERAL PHARMACY W/O HCPCS: Mod: SE

## 2024-09-01 PROCEDURE — 85025 COMPLETE CBC W/AUTO DIFF WBC: CPT | Performed by: EMERGENCY MEDICINE

## 2024-09-01 PROCEDURE — 36415 COLL VENOUS BLD VENIPUNCTURE: CPT | Performed by: STUDENT IN AN ORGANIZED HEALTH CARE EDUCATION/TRAINING PROGRAM

## 2024-09-01 RX ORDER — ONDANSETRON 4 MG/1
4 TABLET, ORALLY DISINTEGRATING ORAL ONCE
Status: COMPLETED | OUTPATIENT
Start: 2024-09-01 | End: 2024-09-01

## 2024-09-01 RX ORDER — METOCLOPRAMIDE 10 MG/1
10 TABLET ORAL EVERY 6 HOURS
Qty: 20 TABLET | Refills: 0 | Status: SHIPPED | OUTPATIENT
Start: 2024-09-01 | End: 2024-09-06

## 2024-09-01 RX ORDER — MORPHINE SULFATE 4 MG/ML
4 INJECTION INTRAVENOUS ONCE
Status: COMPLETED | OUTPATIENT
Start: 2024-09-01 | End: 2024-09-01

## 2024-09-01 RX ORDER — METOCLOPRAMIDE HYDROCHLORIDE 5 MG/ML
10 INJECTION INTRAMUSCULAR; INTRAVENOUS ONCE
Status: COMPLETED | OUTPATIENT
Start: 2024-09-01 | End: 2024-09-01

## 2024-09-01 RX ORDER — ONDANSETRON 4 MG/1
TABLET, ORALLY DISINTEGRATING ORAL
Status: COMPLETED
Start: 2024-09-01 | End: 2024-09-01

## 2024-09-01 RX ORDER — ONDANSETRON HYDROCHLORIDE 2 MG/ML
4 INJECTION, SOLUTION INTRAVENOUS ONCE
Status: COMPLETED | OUTPATIENT
Start: 2024-09-01 | End: 2024-09-01

## 2024-09-01 ASSESSMENT — PAIN DESCRIPTION - LOCATION: LOCATION: ABDOMEN

## 2024-09-01 ASSESSMENT — PAIN - FUNCTIONAL ASSESSMENT
PAIN_FUNCTIONAL_ASSESSMENT: 0-10
PAIN_FUNCTIONAL_ASSESSMENT: 0-10

## 2024-09-01 ASSESSMENT — PAIN SCALES - GENERAL
PAINLEVEL_OUTOF10: 9
PAINLEVEL_OUTOF10: 6

## 2024-09-01 NOTE — DISCHARGE INSTRUCTIONS
Please follow-up with your PCP for further evaluation of abdominal pain and vomiting.  Contact information for the GI clinic has also been included in discharge paperwork to schedule appointment with GI as needed if the abdominal pain and continues.  Prescription for Reglan has been sent to pharmacy to use for nausea and vomiting.  Use medication as prescribed.  Return to the emergency department if your abdominal pain worsens, vomiting worsens, you are vomiting blood, have persistent fevers, become lightheaded, unable to walk due to weakness or any new symptoms begin.

## 2024-09-01 NOTE — ED TRIAGE NOTES
Pt states that she is having abd pain, vomiting, dizziness, and light headedness. Pt states that she was seen on Monday or Tuesday and diagnosed with colitis.

## 2024-09-01 NOTE — PROGRESS NOTES
Patient was handed off to me from the previous team. For full history, physical, and prior ED course, please see previous provider note prior to patient handoff. This is an addendum to the record.    This is a 34-year-old female presenting to the ED with epigastric abdominal pain that began yesterday.  CT abdomen pelvis is pending at time of signout.  CT shows no acute abdominal or pelvic pathology and interval resolution of previously noted mild colonic wall thickening correlating with resolved colitis.  Patient was nauseous and was initially given Zofran.  Patient states the nausea was not relieved with Zofran and was given IV Reglan.  Patient states the nausea was relieved with the Reglan.  Patient passed p.o. challenge and was able to tolerate liquid and food without vomiting.  Patient has remained hemodynamically stable while in the emergency department today.  Patient has not vomited while in the ED.    Hospital Course/MDM:  Please see original ED provider note for hospital course and MDM.    Disposition: Discharge home  Patient was advised to follow-up with her primary care provider.  Strict return precautions were given.  Prescription for Reglan has been sent to the patient's pharmacy for nausea and vomiting.  Plan was discussed the patient and the patient understands and agrees.  Patient was discharged in stable condition.    Didier Cloud PA-C  Emergency Medicine

## 2024-09-01 NOTE — ED PROVIDER NOTES
CC: Abdominal Pain     History provided by: Patient  Limitations to History: None    HPI:    Patient is a 34-year-old female with a PMH of anxiety, PTSD, depression, and irritable bowel syndrome who presents to the emergency department for a chief complaint abdominal pain.  Patient reports she has epigastric abdominal pain that described as sharp/achy in nature.  Associated symptoms include nonbloody nonbilious vomiting.  The patient was recently seen in this emergency department for similar symptomatology and was discharged home with Bentyl and Zofran.  At that time she had a CT scan which was remarkable for colitis.  The patient denies fevers, chills, chest pain, shortness of breath, vaginal bleeding, vaginal discharge, hematuria, or dysuria.    External Records Reviewed: Previous ED records, inpatient records, and outpatient records  ???????????????????????????????????????????????????????????????  Triage Vitals:  T 36.3 °C (97.3 °F)  HR 73  /78  RR 16  O2 100 % None (Room air)    Physical Exam  Constitutional:       General: She is awake. She is not in acute distress.     Appearance: She is not ill-appearing, toxic-appearing or diaphoretic.   Cardiovascular:      Rate and Rhythm: Regular rhythm.      Pulses:           Radial pulses are 2+ on the right side and 2+ on the left side.        Dorsalis pedis pulses are 2+ on the right side and 2+ on the left side.      Heart sounds: Normal heart sounds, S1 normal and S2 normal. Heart sounds not distant. No murmur heard.     No friction rub.   Pulmonary:      Effort: Pulmonary effort is normal. No respiratory distress.      Breath sounds: Normal breath sounds.      Comments: Lungs are clear to auscultation without evidence of wheezing, crackles, rhonchi  Abdominal:      General: Abdomen is flat. There is no distension.      Palpations: Abdomen is soft.      Tenderness: There is abdominal tenderness in the epigastric area. There is no right CVA tenderness, left  CVA tenderness, guarding or rebound.   Musculoskeletal:      Right lower leg: No edema.      Left lower leg: No edema.   Neurological:      Mental Status: She is alert.   Psychiatric:         Behavior: Behavior is cooperative.        ???????????????????????????????????????????????????????????????  ED Course/Treatment/Medical Decision Making    EKG Interpretation:   Normal sinus rhythm. Rate of 61 bpm. Normal axis. Normal intervals. No acute ST elevations, depressions, or T wave inversions.  Unchanged when compared to previous EKG completed on October 2023.    Independent Interpretation of Studies:  I independently interpreted: EKG, CTAP    Differential diagnoses considered include but ar not limited to: Irritable bowel syndrome, colitis, pancreatitis, gastritis, pregnancy, urinary tract infection, bowel obstruction    Social Determinants Limiting Care:  None identified         ED Course:  Diagnoses as of 09/03/24 2245   Abdominal pain, unspecified abdominal location   Nausea and vomiting, unspecified vomiting type       MDM:    Patient is a 34-year-old female with above PMH who presents to the emergency department for chief complaint of abdominal pain.  Upon arrival patient's vital signs are remarkable for hypertension, she is nontoxic-appearing, and appears no acute distress.  Examination is remarkable for epigastric tenderness without evidence of peritonitis.  The patient's pain will be treated with morphine, she will be given 1 L of lactated Ringer's, and Zofran for nausea.  Given the patient's abdominal tenderness a CTAP has been ordered.  Urinalysis is without evidence of urinary tract infection.  Lipase is within normal limits therefore pancreatitis is less likely.  Pregnancy testing is negative.  CBC is without leukocytosis or anemia.  The patient was signed out to oncoming ED provider in stable condition pending CT AP, reevaluation, and further disposition.    Impression:  Signed out to oncoming ED  provider    Disposition:  Signed out to oncoming ED provider    Patient was staffed and discussed with ED attending Dr. Liseth Louie, DO   Emergency Medicine, PGY-2      Procedures ? SmartLinks last updated 9/1/2024 4:06 AM          Cecil Louie, DO  Resident  09/04/24 0503

## 2024-09-17 LAB
ATRIAL RATE: 66 BPM
P AXIS: 78 DEGREES
P OFFSET: 181 MS
P ONSET: 128 MS
PR INTERVAL: 180 MS
Q ONSET: 218 MS
QRS COUNT: 11 BEATS
QRS DURATION: 90 MS
QT INTERVAL: 380 MS
QTC CALCULATION(BAZETT): 398 MS
QTC FREDERICIA: 392 MS
R AXIS: 64 DEGREES
T AXIS: 16 DEGREES
T OFFSET: 408 MS
VENTRICULAR RATE: 66 BPM

## 2024-09-18 ENCOUNTER — OFFICE VISIT (OUTPATIENT)
Dept: URGENT CARE | Age: 34
End: 2024-09-18
Payer: COMMERCIAL

## 2024-09-18 VITALS
WEIGHT: 155 LBS | BODY MASS INDEX: 29.27 KG/M2 | HEART RATE: 72 BPM | HEIGHT: 61 IN | DIASTOLIC BLOOD PRESSURE: 89 MMHG | OXYGEN SATURATION: 98 % | TEMPERATURE: 98.3 F | SYSTOLIC BLOOD PRESSURE: 136 MMHG

## 2024-09-18 DIAGNOSIS — J06.9 ACUTE UPPER RESPIRATORY INFECTION: ICD-10-CM

## 2024-09-18 DIAGNOSIS — R05.1 ACUTE COUGH: ICD-10-CM

## 2024-09-18 DIAGNOSIS — J06.9 ACUTE UPPER RESPIRATORY INFECTION: Primary | ICD-10-CM

## 2024-09-18 LAB — POC SARS-COV-2 AG BINAX: NORMAL

## 2024-09-18 RX ORDER — ALBUTEROL SULFATE 90 UG/1
2 INHALANT RESPIRATORY (INHALATION) EVERY 4 HOURS PRN
Qty: 8.5 G | Refills: 0 | Status: SHIPPED | OUTPATIENT
Start: 2024-09-18 | End: 2025-09-18

## 2024-09-18 RX ORDER — AMOXICILLIN AND CLAVULANATE POTASSIUM 875; 125 MG/1; MG/1
1 TABLET, FILM COATED ORAL 2 TIMES DAILY
Qty: 14 TABLET | Refills: 0 | Status: SHIPPED | OUTPATIENT
Start: 2024-09-18 | End: 2024-09-25

## 2024-09-18 ASSESSMENT — PATIENT HEALTH QUESTIONNAIRE - PHQ9
SUM OF ALL RESPONSES TO PHQ9 QUESTIONS 1 AND 2: 0
2. FEELING DOWN, DEPRESSED OR HOPELESS: NOT AT ALL
1. LITTLE INTEREST OR PLEASURE IN DOING THINGS: NOT AT ALL

## 2024-09-18 NOTE — PATIENT INSTRUCTIONS
Upper Respiratory Infection     An upper respiratory infection are caused by viruses but sometimes they cause secondary bacterial infections. It can cause cough, congestion, runny nose, sore throat, and fever. You are contagious. Fever medicines can reduce fever and pain. Virus cannot be cured by an antibiotic. The body's immune system will fight off the virus. Upper Respiratory Illnesses usually improves in 7 - 10 days, but coughs can last for several weeks. If your symptoms worsen after 10 - 14 days you may have a bacterial infection.      Home Care:   1. You can use acetaminophen (paracetamol, Tylenol) for fever or sore throat.   2. You might use a cool mist humidifier/vaporizer in your room if the air is dry.   3. Sleeping in a more upright position can be helpful.    4. Follow up with your primary care for continued symptoms.  5. Begin taking Augmentin and using albuterol inhaler    See your doctor for a recheck visit tomorrow or as soon as possible if not better.     Call your doctor or go to the emergency department if worse or:  1. Breathing trouble occurs.   2. Color is pale, bluish, or gray.   3. Chest pain occurs  4. No urination occurs in 12 hours.   5. Fever lasts for more than 2 days.

## 2024-09-18 NOTE — PROGRESS NOTES
Subjective   Patient ID: Alba Mendez is a 34 y.o. female. They present today with a chief complaint of Cough and Nasal Congestion (FOR x5 days).    History of Present Illness  HPI  Patient presents to the urgent care with 5-day history of productive cough, 6-day history of malaise.  Patient states she generally does not feel well, chest and abdomen are sore from coughing.  Patient states her daughter was initially sick with cold symptoms as well, but recovered after a few days.  Past Medical History  Allergies as of 2024 - Reviewed 2024   Allergen Reaction Noted    Bee pollen Itching 2023    Ragweed Itching 2023    Tree and shrub pollen Itching 10/11/2023       (Not in a hospital admission)       Past Medical History:   Diagnosis Date    Hypertension 2023    H/o HTN, now resolved. Last elevated BP 2021.    Personal history of gestational diabetes     History of gestational diabetes mellitus (GDM)    Personal history of other complications of pregnancy, childbirth and the puerperium     History of pre-eclampsia    Personal history of other diseases of the respiratory system     History of asthma       Past Surgical History:   Procedure Laterality Date    OTHER SURGICAL HISTORY  2019    Breast surgery    OTHER SURGICAL HISTORY  2019     section    OTHER SURGICAL HISTORY  2019    Oral surgery        reports that she has never smoked. She has never used smokeless tobacco. She reports that she does not drink alcohol and does not use drugs.    Review of Systems  Review of Systems     Patient denies sore throat, fever, nausea, diarrhea, vomiting, rash, dizziness, shortness of breath, increased urinary frequency, chills, peripheral edema, abdominal pain, chest pain.          Objective    Vitals:    24 0944   BP: 136/89   BP Location: Left arm   Patient Position: Sitting   BP Cuff Size: Adult   Pulse: 72   Temp: 36.8 °C (98.3 °F)   TempSrc: Oral  "  SpO2: 98%   Weight: 70.3 kg (155 lb)   Height: 1.549 m (5' 1\")     Patient's last menstrual period was 09/01/2024 (approximate).    Physical Exam  Appearance: Alert, oriented, cooperative, in no acute distress. Well nourished & well hydrated.    Skin: Intact, no lesions, rash, petechiae or purpura.     Eyes: Conjunctiva pink with no redness or exudates. Eyelids without lesions. No scleral icterus.     ENT: Hearing grossly intact. External inspection of ears without lesions or erythema. no nasal flaring. no tripoding, no drooling, no difficulty swallowing oral secretions.    Pulmonary: Clear bilaterally with good chest wall excursion. No rales, rhonchi or wheezing. No accessory muscle use or stridor. No difficulty completing a full sentence without taking a breath, no labored breathing w ambulation, deep productive cough    Cardiac: Normal S1, S2, no rub, gallop. No JVD.    Musculoskeletal: no edema, or deformity. No cyanosis, clubbing.    Neurological: no focal findings identified.    Psychiatric: Appropriate mood and affect.    Procedures    Point of Care Test & Imaging Results from this visit  Results for orders placed or performed in visit on 09/18/24   POCT Covid-19 Rapid Antigen   Result Value Ref Range    POC SHAAN-COV-2 AG  Presumptive negative test for SARS-CoV-2 (no antigen detected)     Presumptive negative test for SARS-CoV-2 (no antigen detected)      XR chest 2 views    Result Date: 9/18/2024  Interpreted By:  Daniela Thomas, STUDY: XR CHEST 2 VIEWS;  9/18/2024 10:36 am   INDICATION: Signs/Symptoms:cough.   ,R05.1 Acute cough,J06.9 Acute upper respiratory infection, unspecified   COMPARISON: 10/11/2023   ACCESSION NUMBER(S): [id number]   ORDERING CLINICIAN: GAVINO ELIZABETH   FINDINGS:         CARDIOMEDIASTINAL SILHOUETTE: Cardiomediastinal silhouette is normal in size and configuration.   LUNGS: Lungs are clear.   ABDOMEN: No remarkable upper abdominal findings.   BONES: No acute osseous changes.   "     1.  No evidence of acute cardiopulmonary process.       MACRO: None   Signed by: Daniela Thomas 9/18/2024 10:51 AM Dictation workstation:   [id number]     Diagnostic study results (if any) were reviewed by Michelle Barnett PA-C.    Assessment/Plan   Allergies, medications, history, and pertinent labs/EKGs/Imaging reviewed by Michelle Barnett PA-C.     Medical Decision Making  Considerations for patient´s symptoms include viral/bacterial infection, and seasonal allergies. Physical exam- deep productive cough, no signs of respiratory distress. Pt denies any other URI symptoms. Preliminary review of CXR shows no acute infiltrates, lungs well inflated, trachea midline, no pleural effusion.   Given symptom onset/length, progression, and lack of systemic viral symptoms at this time, bacterial URI considered the most likely cause. Patient will begin treatment with Augmentin, albuterol. If symptoms are not improving or if they are worsening the patient will call their primary care physician and go to the ER. Patient verbalizes understanding and agrees with plan of care. All questions were answered.    Dictation software was used in the creation of this note which does not evaluate or correct for typographical, spelling, syntax or grammatical errors.      Orders and Diagnoses  Diagnoses and all orders for this visit:  Acute upper respiratory infection  -     XR chest 2 views; Future  -     amoxicillin-pot clavulanate (Augmentin) 875-125 mg tablet; Take 1 tablet by mouth 2 times a day for 7 days.  -     albuterol (ProAir HFA) 90 mcg/actuation inhaler; Inhale 2 puffs every 4 hours if needed for other (cough).  Acute cough  -     POCT Covid-19 Rapid Antigen  -     XR chest 2 views; Future      Medical Admin Record      Patient disposition: Home    Electronically signed by Michelle Barnett PA-C  1:44 PM

## 2024-09-24 ENCOUNTER — APPOINTMENT (OUTPATIENT)
Dept: OBSTETRICS AND GYNECOLOGY | Facility: CLINIC | Age: 34
End: 2024-09-24
Payer: COMMERCIAL

## 2024-09-24 ENCOUNTER — APPOINTMENT (OUTPATIENT)
Dept: PRIMARY CARE | Facility: CLINIC | Age: 34
End: 2024-09-24
Payer: COMMERCIAL

## 2024-09-24 ENCOUNTER — OFFICE VISIT (OUTPATIENT)
Dept: GASTROENTEROLOGY | Facility: CLINIC | Age: 34
End: 2024-09-24
Payer: COMMERCIAL

## 2024-09-24 VITALS
BODY MASS INDEX: 27.19 KG/M2 | HEIGHT: 61 IN | WEIGHT: 144 LBS | SYSTOLIC BLOOD PRESSURE: 117 MMHG | DIASTOLIC BLOOD PRESSURE: 80 MMHG

## 2024-09-24 VITALS
WEIGHT: 145 LBS | RESPIRATION RATE: 18 BRPM | BODY MASS INDEX: 27.38 KG/M2 | HEIGHT: 61 IN | TEMPERATURE: 98.4 F | DIASTOLIC BLOOD PRESSURE: 77 MMHG | SYSTOLIC BLOOD PRESSURE: 122 MMHG | HEART RATE: 75 BPM

## 2024-09-24 DIAGNOSIS — R11.2 NAUSEA AND VOMITING, UNSPECIFIED VOMITING TYPE: ICD-10-CM

## 2024-09-24 DIAGNOSIS — Z30.44 ENCOUNTER FOR SURVEILLANCE OF VAGINAL RING HORMONAL CONTRACEPTIVE DEVICE: ICD-10-CM

## 2024-09-24 DIAGNOSIS — R14.0 BLOATING: ICD-10-CM

## 2024-09-24 DIAGNOSIS — R19.7 DIARRHEA, UNSPECIFIED TYPE: ICD-10-CM

## 2024-09-24 DIAGNOSIS — Z01.419 ENCOUNTER FOR GYNECOLOGICAL EXAMINATION WITHOUT ABNORMAL FINDING: Primary | ICD-10-CM

## 2024-09-24 DIAGNOSIS — R10.10 PAIN OF UPPER ABDOMEN: Primary | ICD-10-CM

## 2024-09-24 PROCEDURE — 3078F DIAST BP <80 MM HG: CPT | Performed by: STUDENT IN AN ORGANIZED HEALTH CARE EDUCATION/TRAINING PROGRAM

## 2024-09-24 PROCEDURE — 1036F TOBACCO NON-USER: CPT | Performed by: STUDENT IN AN ORGANIZED HEALTH CARE EDUCATION/TRAINING PROGRAM

## 2024-09-24 PROCEDURE — 99395 PREV VISIT EST AGE 18-39: CPT | Performed by: OBSTETRICS & GYNECOLOGY

## 2024-09-24 PROCEDURE — 3079F DIAST BP 80-89 MM HG: CPT | Performed by: OBSTETRICS & GYNECOLOGY

## 2024-09-24 PROCEDURE — 87491 CHLMYD TRACH DNA AMP PROBE: CPT

## 2024-09-24 PROCEDURE — 3008F BODY MASS INDEX DOCD: CPT | Performed by: OBSTETRICS & GYNECOLOGY

## 2024-09-24 PROCEDURE — 88175 CYTOPATH C/V AUTO FLUID REDO: CPT

## 2024-09-24 PROCEDURE — 87624 HPV HI-RISK TYP POOLED RSLT: CPT

## 2024-09-24 PROCEDURE — 3074F SYST BP LT 130 MM HG: CPT | Performed by: OBSTETRICS & GYNECOLOGY

## 2024-09-24 PROCEDURE — 3008F BODY MASS INDEX DOCD: CPT | Performed by: STUDENT IN AN ORGANIZED HEALTH CARE EDUCATION/TRAINING PROGRAM

## 2024-09-24 PROCEDURE — 3074F SYST BP LT 130 MM HG: CPT | Performed by: STUDENT IN AN ORGANIZED HEALTH CARE EDUCATION/TRAINING PROGRAM

## 2024-09-24 PROCEDURE — 87591 N.GONORRHOEAE DNA AMP PROB: CPT

## 2024-09-24 PROCEDURE — 87661 TRICHOMONAS VAGINALIS AMPLIF: CPT

## 2024-09-24 PROCEDURE — 99204 OFFICE O/P NEW MOD 45 MIN: CPT | Performed by: STUDENT IN AN ORGANIZED HEALTH CARE EDUCATION/TRAINING PROGRAM

## 2024-09-24 PROCEDURE — 1036F TOBACCO NON-USER: CPT | Performed by: OBSTETRICS & GYNECOLOGY

## 2024-09-24 RX ORDER — ETONOGESTREL AND ETHINYL ESTRADIOL VAGINAL RING .015; .12 MG/D; MG/D
1 RING VAGINAL
Qty: 3 EACH | Refills: 4 | Status: SHIPPED | OUTPATIENT
Start: 2024-09-24 | End: 2025-09-24

## 2024-09-24 NOTE — PROGRESS NOTES
CC: Irritable bowel syndrome.    History of Present Illness:   Alba Mendez is a 34 y.o. female with a PMH of HTN, gestational diabetes, asthma, depression/anxiety, PTSD who presents to clinic for epigastric abdominal pain, cramping, N/V, bloating, and diarrhea. Patient previously followed with GI in  through .  She had multiple no-shows in the past. She complains of abdominal pain/cramping, bloating, and diarrhea. It is located in the upper abdomen. She has 6-10 diarrhea episodes per IBS falre (3-4x per month). The other days she feels okay or with some abdominal discomfort.  Stress levels are high. Does not consume dairy. 2-3 cans of pop per week. Drinks apple juice 2-3 week. No alcohol. Has not tried low FODMAP diet. No prior EGD. Two recent ED visits. Amitriptyline worked well in the past. b    Colonoscopy : Hemorrhoids, skin tags.  Normal biopsies of the TI, R/L colon, rectum.  CT A/P 2024: WNL.  CT 2024: Resolved.   Normal recent LFTs, kidney function, CBC.    Review of Systems  ROS Negative unless otherwise stated above.    Past Medical/Surgical History  Past Medical History:   Diagnosis Date    Hypertension 2023    H/o HTN, now resolved. Last elevated BP 2021.    Personal history of gestational diabetes     History of gestational diabetes mellitus (GDM)    Personal history of other complications of pregnancy, childbirth and the puerperium     History of pre-eclampsia    Personal history of other diseases of the respiratory system     History of asthma      Past Surgical History:   Procedure Laterality Date    OTHER SURGICAL HISTORY  2019    Breast surgery    OTHER SURGICAL HISTORY  2019     section    OTHER SURGICAL HISTORY  2019    Oral surgery        Social History   reports that she has never smoked. She has never used smokeless tobacco. She reports current drug use. Drug: Marijuana. She reports that she does not drink alcohol.     Family  History  family history includes Anexiety and Depression in her father; Anxiety and Depression in her mother; Arthritis in her father and mother; COPD in her mother; Cancer in her mother; Cardiac Disorder in her father and mother; Cerebral aneurysm in her father; Cerebrovascular (CVA) in her father; Diabetes in her mother and another family member; Drug abuse in her father and mother; Heart failure in her mother; Multiple Organ System Failure in her mother.     Allergies  Allergies   Allergen Reactions    Bee Pollen Itching     Watery eyes, itchy throat    Ragweed Itching     Watery eyes and itchy throat    Tree And Shrub Pollen Itching     Watery eyes and itchy throat       Medications  Current Outpatient Medications   Medication Instructions    albuterol (ProAir HFA) 90 mcg/actuation inhaler 2 puffs, inhalation, Every 4 hours PRN    amoxicillin-pot clavulanate (Augmentin) 875-125 mg tablet 1 tablet, oral, 2 times daily    etonogestreL-ethinyl estradioL (Nuvaring) 0.12-0.015 mg/24 hr vaginal ring 1 each, vaginal, Every 28 days, Insert vaginal ring for 3 weeks, then remove for 1 week.        Objective   Visit Vitals  /77   Pulse 75   Temp 36.9 °C (98.4 °F)   Resp 18        General: A&Ox3, NAD.  HEENT: AT/NC.   CV: RRR. No murmur.  Resp: CTA bilaterally. No wheezing, rhonchi or rales.   GI: Soft. TTP in the epigastric area.   Extrem: No edema. Pulses intact.  Skin: No Jaundice.   Neuro: No focal deficits.   Psych: Normal mood and affect.     Lab Results   Component Value Date    WBC 5.9 09/01/2024    HGB 12.9 09/01/2024    HCT 37.6 09/01/2024    MCV 86 09/01/2024     09/01/2024       Chemistry    Lab Results   Component Value Date/Time     09/01/2024 0323    K 4.1 09/01/2024 0323     09/01/2024 0323    CO2 22 09/01/2024 0323    BUN 14 09/01/2024 0323    CREATININE 0.76 09/01/2024 0323    Lab Results   Component Value Date/Time    CALCIUM 10.0 09/01/2024 0323    ALKPHOS 63 09/01/2024 0323     AST 11 09/01/2024 0323    ALT 12 09/01/2024 0323    BILITOT 0.4 09/01/2024 0323             ASSESSMENT/PLAN  Alba Mendez is a 34 y.o. female with a PMH of HTN, gestational diabetes, asthma, depression/anxiety, PTSD who presents to clinic for epigastric abdominal pain, cramping, N/V, bloating, and diarrhea.  Suspect functional/continued IBS.  However, could also consider the epigastric pain being related to upper abdominal pathology such as GERD, PUD, esophagitis, etc.  We will obtain celiac studies and start with dietary changes, stress reduction, low FODMAP diet.  Patient must avoid all pop, juice.  Judicious use of Imodium. Avoid NSAIDs. Consider restarting amitriptyline 50 mg nightly.  Consider PPI.  Consider endoscopic evaluation pending clinical course.      Riki Velazquez, DO

## 2024-09-24 NOTE — PROGRESS NOTES
Subjective   Alba Mendez is a 34 y.o. female here for a routine exam.  She is not on contraception.  She has regular cycles.  Her last cycle was 2024.  She would like to resume contraception, we discussed NuvaRing as her blood pressure today is normal 117/80.    Her son is present for the visit.  She is  section in 2023.    We did discuss that she had an EAB about May 2024 with a D&C, no complications.  Review of the chart notes a CT scan 2024 that noted colitis, no GYN concerns.    She has no dysuria, no pelvic pain, no discharge.    Personal health questionnaire reviewed: yes.     Gynecologic History  Patient's last menstrual period was 2024 (approximate).  Contraception: NuvaRing vaginal inserts  Last Pap: 8/10/21. Results were: normal.    Obstetric History  OB History    Para Term  AB Living   4 3 2 1 1 3   SAB IAB Ectopic Multiple Live Births   1 0 0 0 3      # Outcome Date GA Lbr Danyel/2nd Weight Sex Type Anes PTL Lv   4 Term 23 37w4d  3.45 kg M CS-LTranv Spinal, EPI  SANDI   3 Term 18 39w5d  3.459 kg F CS-Unspec EPI  SANDI      Complications: Failure to Progress in Second Stage, Failure to progress in labor ([facility])   2 SAB  16w0d   F    FD   1   25w0d  0.567 kg F Vag-Spont  Y SANDI      Obstetric Comments   Blood Pressure Kit Device - Pt to take Blood pressure at least once a day 2023   Unspecified Medication - blood pressire cuff 2023   Aspirin 81 MG Oral Tablet Chewable - CHEW 1 TABLET BY MOUTH EVERY DAY 2023   Ferrous Sulfate 325 (65 Fe) MG Oral Tablet - TAKE 1 TABLET BY MOUTH EVERY DAY AS DIRECTED 2023   Prenatal Vitamins 28-0.8 MG Oral Tablet - TAKE 1 TABLET BY MOUTH EVERY DAY 2023       Objective   Constitutional: Alert and in no acute distress. Well developed, well nourished.   Head and Face: Head and face: Normal.    Eyes: Normal external exam - nonicteric sclera, extraocular  movements intact (EOMI) and no ptosis.   Neck: No neck asymmetry. Supple. Thyroid not enlarged and there were no palpable thyroid nodules.    Pulmonary: No respiratory distress.   Chest: Breasts: Normal appearance, no nipple discharge and no skin changes. Palpation of breasts and axillae: No palpable mass and no axillary lymphadenopathy.   Abdomen: Soft nontender; no abdominal mass palpated. No organomegaly. No hernias.   Genitourinary: External genitalia: Normal. No inguinal lymphadenopathy. Bartholin's Urethral and Skenes Glands: Normal. Urethra: Normal.  Bladder: Normal on palpation. Vagina: Normal. Cervix: Normal.  Uterus: Normal.  Right Adnexa/parametria: Normal.  Left Adnexa/parametria: Normal.  Inspection of Perianal Area: Normal.   Musculoskeletal: No joint swelling seen, normal movements of all extremities.   Skin: Normal skin color and pigmentation, normal skin turgor, and no rash.   Neurologic: Non-focal. Grossly intact.   Psychiatric: Alert and oriented x 3. Affect normal to patient baseline. Mood: Appropriate.  Physical Exam     Assessment/Plan   Healthy female exam.  This is a 34-year-old female with a normal exam.  A Pap smear was sent, including cultures for chlamydia, gonorrhea and trichomonas.    We discussed birth control options, her blood pressure is currently reassuring.  We discussed resuming NuvaRing which she has used in the past.  She does have a blood pressure cuff at home and will check her blood pressure in 3 months.    She will call me with any concerns, I will see her routinely in 1 year.  Contraception: NuvaRing vaginal inserts.

## 2024-09-25 LAB
C TRACH RRNA SPEC QL NAA+PROBE: NEGATIVE
N GONORRHOEA DNA SPEC QL PROBE+SIG AMP: NEGATIVE
T VAGINALIS RRNA SPEC QL NAA+PROBE: NEGATIVE

## 2024-10-02 ENCOUNTER — APPOINTMENT (OUTPATIENT)
Dept: PRIMARY CARE | Facility: CLINIC | Age: 34
End: 2024-10-02
Payer: COMMERCIAL

## 2024-10-08 LAB
CYTOLOGY CMNT CVX/VAG CYTO-IMP: NORMAL
HPV HR 12 DNA GENITAL QL NAA+PROBE: NEGATIVE
HPV HR GENOTYPES PNL CVX NAA+PROBE: NEGATIVE
HPV16 DNA SPEC QL NAA+PROBE: NEGATIVE
HPV18 DNA SPEC QL NAA+PROBE: NEGATIVE
LAB AP HPV GENOTYPE QUESTION: YES
LAB AP HPV HR: NORMAL
LAB AP PAP ADDITIONAL TESTS: NORMAL
LABORATORY COMMENT REPORT: NORMAL
PATH REPORT.TOTAL CANCER: NORMAL

## 2024-10-12 ENCOUNTER — PATIENT MESSAGE (OUTPATIENT)
Dept: GASTROENTEROLOGY | Facility: CLINIC | Age: 34
End: 2024-10-12

## 2024-10-12 ENCOUNTER — LAB (OUTPATIENT)
Dept: LAB | Facility: LAB | Age: 34
End: 2024-10-12
Payer: COMMERCIAL

## 2024-10-12 DIAGNOSIS — R14.0 BLOATING: ICD-10-CM

## 2024-10-12 DIAGNOSIS — R10.10 PAIN OF UPPER ABDOMEN: ICD-10-CM

## 2024-10-12 DIAGNOSIS — R11.2 NAUSEA AND VOMITING, UNSPECIFIED VOMITING TYPE: ICD-10-CM

## 2024-10-12 DIAGNOSIS — R19.7 DIARRHEA, UNSPECIFIED TYPE: ICD-10-CM

## 2024-10-12 PROCEDURE — 82784 ASSAY IGA/IGD/IGG/IGM EACH: CPT

## 2024-10-12 PROCEDURE — 83516 IMMUNOASSAY NONANTIBODY: CPT

## 2024-10-12 PROCEDURE — 36415 COLL VENOUS BLD VENIPUNCTURE: CPT

## 2024-10-13 LAB
IGA SERPL-MCNC: 114 MG/DL (ref 70–400)
TTG IGA SER IA-ACNC: <1 U/ML

## 2024-10-14 DIAGNOSIS — K58.9 IRRITABLE BOWEL SYNDROME, UNSPECIFIED TYPE: Primary | ICD-10-CM

## 2024-10-14 RX ORDER — DICYCLOMINE HYDROCHLORIDE 10 MG/1
10 CAPSULE ORAL 4 TIMES DAILY PRN
Qty: 120 CAPSULE | Refills: 2 | Status: SHIPPED | OUTPATIENT
Start: 2024-10-14 | End: 2025-10-14

## 2024-10-23 ENCOUNTER — APPOINTMENT (OUTPATIENT)
Dept: PRIMARY CARE | Facility: CLINIC | Age: 34
End: 2024-10-23
Payer: COMMERCIAL

## 2024-10-23 VITALS
HEART RATE: 82 BPM | WEIGHT: 140.5 LBS | HEIGHT: 61 IN | SYSTOLIC BLOOD PRESSURE: 120 MMHG | BODY MASS INDEX: 26.53 KG/M2 | OXYGEN SATURATION: 99 % | DIASTOLIC BLOOD PRESSURE: 82 MMHG

## 2024-10-23 DIAGNOSIS — L98.9 SKIN LESIONS: ICD-10-CM

## 2024-10-23 DIAGNOSIS — K58.0 IRRITABLE BOWEL SYNDROME WITH DIARRHEA: Primary | ICD-10-CM

## 2024-10-23 DIAGNOSIS — Z00.00 HEALTH MAINTENANCE EXAMINATION: ICD-10-CM

## 2024-10-23 DIAGNOSIS — Z76.89 ENCOUNTER TO ESTABLISH CARE: ICD-10-CM

## 2024-10-23 PROCEDURE — 99203 OFFICE O/P NEW LOW 30 MIN: CPT | Performed by: INTERNAL MEDICINE

## 2024-10-23 PROCEDURE — 3079F DIAST BP 80-89 MM HG: CPT | Performed by: INTERNAL MEDICINE

## 2024-10-23 PROCEDURE — 3074F SYST BP LT 130 MM HG: CPT | Performed by: INTERNAL MEDICINE

## 2024-10-23 PROCEDURE — 1036F TOBACCO NON-USER: CPT | Performed by: INTERNAL MEDICINE

## 2024-10-23 PROCEDURE — 3008F BODY MASS INDEX DOCD: CPT | Performed by: INTERNAL MEDICINE

## 2024-10-23 ASSESSMENT — PATIENT HEALTH QUESTIONNAIRE - PHQ9
1. LITTLE INTEREST OR PLEASURE IN DOING THINGS: NOT AT ALL
SUM OF ALL RESPONSES TO PHQ9 QUESTIONS 1 AND 2: 0
2. FEELING DOWN, DEPRESSED OR HOPELESS: NOT AT ALL

## 2024-10-23 ASSESSMENT — ENCOUNTER SYMPTOMS
PALPITATIONS: 0
FEVER: 0
DYSURIA: 0
VOMITING: 0
HEMATURIA: 0
FATIGUE: 0
DIZZINESS: 0
BLOOD IN STOOL: 0
COLOR CHANGE: 1
OCCASIONAL FEELINGS OF UNSTEADINESS: 0
LOSS OF SENSATION IN FEET: 0
ABDOMINAL PAIN: 0
CHILLS: 0
DIFFICULTY URINATING: 0
SHORTNESS OF BREATH: 0
NAUSEA: 0
DEPRESSION: 0

## 2024-10-23 NOTE — PROGRESS NOTES
"Subjective   Patient ID: Alba Mendez is a 34 y.o. female who presents for Establish Care.  Alba Mendez is a 34 y.o. female with a PMH of HTN, gestational diabetes, asthma, depression/anxiety, and PTSD presents to the clinic to establish care. Patient came in alone, feels well overall, and denies any acute distress. She work for [employer]. Lives at home with  and 3 children. Feels safe at home. No recent illness or hospitalization. Sleep and appetite good. She follows with GI for her IBS (both diarrhea and constipation. Asthma well controlled with no recent exacerbation. She denies tobacco or alcohol use. She uses marijuana for her IBS which seems to help. Family Hx include father (stroke, DM, HTN) and mother (heart failure). She denies fever, chills, sweats, CP, SOB, N/V, blood in stools, abdominal pain, urinary symptoms, or leg swelling. She did mention skin lesions non raised, non itchy, \"liver spots\" and requesting dermatology referral. Follows with OBGYN for reproductive health.         Review of Systems   Constitutional:  Negative for chills, fatigue and fever.   Respiratory:  Negative for shortness of breath.    Cardiovascular:  Negative for chest pain, palpitations and leg swelling.   Gastrointestinal:  Negative for abdominal pain, blood in stool, nausea and vomiting.   Genitourinary:  Negative for difficulty urinating, dysuria and hematuria.   Skin:  Positive for color change (spots).   Neurological:  Negative for dizziness.       Objective   Physical Exam  Constitutional:       General: She is not in acute distress.  HENT:      Head: Normocephalic and atraumatic.   Cardiovascular:      Rate and Rhythm: Normal rate.   Pulmonary:      Effort: Pulmonary effort is normal. No respiratory distress.      Breath sounds: No wheezing.   Abdominal:      General: Bowel sounds are normal.      Palpations: Abdomen is soft.      Tenderness: There is no abdominal tenderness.   Musculoskeletal:         General: " "No swelling. Normal range of motion.   Skin:     General: Skin is warm.      Findings: Lesion present.   Neurological:      Mental Status: She is alert and oriented to person, place, and time.      Motor: No weakness.   Psychiatric:         Mood and Affect: Mood normal.         Behavior: Behavior normal.         Thought Content: Thought content normal.         Judgment: Judgment normal.         Assessment/Plan   Problem List Items Addressed This Visit       Irritable bowel syndrome - Primary    Relevant Orders    Hemoglobin A1C    Lipid Panel    TSH with reflex to Free T4 if abnormal     Other Visit Diagnoses       Encounter to establish care        Relevant Orders    Albumin-Creatinine Ratio, Urine Random    Hemoglobin A1C    Lipid Panel    TSH with reflex to Free T4 if abnormal    Vitamin D 25-Hydroxy,Total (for eval of Vitamin D levels)    Skin lesions        Relevant Orders    Referral to Dermatology             Alba Mendez is a 34 y.o. female with a PMH of HTN, gestational diabetes, asthma, depression/anxiety, and PTSD presents to the clinic to establish care. Patient came in alone, feels well overall, and denies any acute distress. She work for [employer]. Lives at home with  and 3 children. Feels safe at home. No recent illness or hospitalization. Sleep and appetite good. She follows with GI for her IBS (both diarrhea and constipation. Asthma well controlled with no recent exacerbation. She denies tobacco or alcohol use. She uses marijuana for her IBS which seems to help. Family Hx include father (stroke, DM, HTN) and mother (heart failure). She denies fever, chills, sweats, CP, SOB, N/V, blood in stools, abdominal pain, urinary symptoms, or leg swelling. She did mention skin lesions non raised, non itchy, \"liver spots\" and requesting dermatology referral. Follows with OBGYN for reproductive health.       #hx of HTN  #hx of gestational diabetes   #hx of preeclmapsia  -CBC and CMP completed in September " and unremarkable  -Ordered urine albumin     #IBS (both diarrhea and constipation)  #abdominal pain and cramping   -Follows with GI   -On Dicyclomine   -per GI: Suspect functional/continued IBS.  However, could also consider the epigastric pain being related to upper abdominal pathology such as GERD, PUD, esophagitis, etc.  We will obtain celiac studies and start with dietary changes, stress reduction, low FODMAP diet.  Patient must avoid all pop, juice.  Judicious use of Imodium. Avoid NSAIDs. Consider restarting amitriptyline 50 mg nightly.  Consider PPI.  Consider endoscopic evaluation pending clinical course    #Skin spots   -patient requesting dermatology referral         -PAP/Reproductive health: follows with OBGYN-Recent visit   -Immunizations: deferred flu shot today   -Ordered A1C, lipid panel, TSH, And vitamin D      Will check result; if no concern, patient can RTC for annual visit (can schedule) or as needed

## 2024-10-23 NOTE — PROGRESS NOTES
I reviewed the resident/fellow's documentation and discussed the patient with the resident/fellow. I agree with the resident/fellow's medical decision making as documented in the note.  As the attending physician,  I reviewed the relevant imaging studies and available reports. I also discussed the differential diagnosis and all of the proposed management plans with the resident.    Josefa Parikh MD

## 2024-11-02 ENCOUNTER — OFFICE VISIT (OUTPATIENT)
Dept: URGENT CARE | Age: 34
End: 2024-11-02
Payer: COMMERCIAL

## 2024-11-02 VITALS
OXYGEN SATURATION: 98 % | BODY MASS INDEX: 25.89 KG/M2 | DIASTOLIC BLOOD PRESSURE: 76 MMHG | HEART RATE: 93 BPM | TEMPERATURE: 98.4 F | WEIGHT: 137 LBS | RESPIRATION RATE: 16 BRPM | SYSTOLIC BLOOD PRESSURE: 121 MMHG

## 2024-11-02 DIAGNOSIS — N30.01 ACUTE CYSTITIS WITH HEMATURIA: ICD-10-CM

## 2024-11-02 LAB
POC APPEARANCE, URINE: CLEAR
POC BILIRUBIN, URINE: ABNORMAL
POC BLOOD, URINE: ABNORMAL
POC COLOR, URINE: ABNORMAL
POC GLUCOSE, URINE: NEGATIVE MG/DL
POC KETONES, URINE: ABNORMAL MG/DL
POC LEUKOCYTES, URINE: NEGATIVE
POC NITRITE,URINE: NEGATIVE
POC PH, URINE: 5.5 PH
POC PROTEIN, URINE: NEGATIVE MG/DL
POC SPECIFIC GRAVITY, URINE: >=1.03
POC UROBILINOGEN, URINE: 0.2 EU/DL
PREGNANCY TEST URINE, POC: NEGATIVE

## 2024-11-02 PROCEDURE — 87186 SC STD MICRODIL/AGAR DIL: CPT

## 2024-11-02 PROCEDURE — 87086 URINE CULTURE/COLONY COUNT: CPT

## 2024-11-02 RX ORDER — NITROFURANTOIN 25; 75 MG/1; MG/1
100 CAPSULE ORAL 2 TIMES DAILY
Qty: 10 CAPSULE | Refills: 0 | Status: SHIPPED | OUTPATIENT
Start: 2024-11-02 | End: 2024-11-07

## 2024-11-02 ASSESSMENT — ENCOUNTER SYMPTOMS
CONFUSION: 0
FATIGUE: 0
BACK PAIN: 0
LIGHT-HEADEDNESS: 0
DYSURIA: 0
HEADACHES: 0
CHILLS: 0
FEVER: 0
RESPIRATORY NEGATIVE: 1
DIZZINESS: 0
FREQUENCY: 1

## 2024-11-02 ASSESSMENT — PAIN SCALES - GENERAL: PAINLEVEL_OUTOF10: 0-NO PAIN

## 2024-11-05 LAB — BACTERIA UR CULT: ABNORMAL

## 2024-11-07 DIAGNOSIS — K58.9 IRRITABLE BOWEL SYNDROME, UNSPECIFIED TYPE: ICD-10-CM

## 2024-11-07 RX ORDER — DICYCLOMINE HYDROCHLORIDE 10 MG/1
10 CAPSULE ORAL 4 TIMES DAILY PRN
Qty: 360 CAPSULE | Refills: 1 | Status: SHIPPED | OUTPATIENT
Start: 2024-11-07 | End: 2025-11-07

## 2024-11-22 ENCOUNTER — PATIENT MESSAGE (OUTPATIENT)
Dept: OBSTETRICS AND GYNECOLOGY | Facility: CLINIC | Age: 34
End: 2024-11-22
Payer: COMMERCIAL

## 2024-11-22 DIAGNOSIS — R39.9 UTI SYMPTOMS: Primary | ICD-10-CM

## 2024-11-22 RX ORDER — CIPROFLOXACIN 500 MG/1
500 TABLET ORAL 2 TIMES DAILY
Qty: 10 TABLET | Refills: 0 | Status: SHIPPED | OUTPATIENT
Start: 2024-11-22 | End: 2024-11-27

## 2024-12-05 ENCOUNTER — APPOINTMENT (OUTPATIENT)
Dept: BEHAVIORAL HEALTH | Facility: CLINIC | Age: 34
End: 2024-12-05
Payer: COMMERCIAL

## 2024-12-05 DIAGNOSIS — F41.1 ANXIETY, GENERALIZED: ICD-10-CM

## 2024-12-05 DIAGNOSIS — F34.1 PERSISTENT DEPRESSIVE DISORDER: ICD-10-CM

## 2024-12-05 PROCEDURE — 90837 PSYTX W PT 60 MINUTES: CPT | Performed by: SOCIAL WORKER

## 2024-12-05 NOTE — PROGRESS NOTES
Televideo Informed Consent for psychological evaluation was reviewed with the patient as follows:     There are potential benefits and risks of the use of telephone or video-conferencing that differ from in-person sessions. Specifically, the telephone or televideo system we are using may not be HIPPA compliant and may present limits to patient confidentiality. Confidentiality still applies for telepsychology services, and nobody will record the session without your permission.    Understanding and verbal agreement was attested to by the patient. Patient identity was confirmed using 3 sources, including telephone number, email address and date of birth. Provision of services via telehealth was necessitated by the restrictions on face-to-face visits accompanying the COVID-19 pandemic.     SECURE NOTE:  This document may not be released or reproduced in any form without the consent of either the Provider, the Provider´s  or the Chair of the Department of Psychiatry. This prohibition includes copying the document into any non-Restricted area of the Ambulatory Electronic Medical Record.      Start time : 8:01 am  End time : 9:00 am    Diagnoses : JANY and MDD    Clients current issues: Client and her  are arguing about how to manage her daughter Alea.    Treatment interventions: Assisted this couple in acting as a team to parent her. Suggested a family meeting.  Suggested loving firm boundaries.   Explained Loc needs to be a friend and Kristyn needs to be the disciplinarian.     Prognosis:  good    Treatment plan update: Client and her  need ongoing support in parenting her daughter.      Continue weekly ongoing psychotherapy.

## 2025-01-15 ENCOUNTER — APPOINTMENT (OUTPATIENT)
Dept: BEHAVIORAL HEALTH | Facility: CLINIC | Age: 35
End: 2025-01-15
Payer: COMMERCIAL

## 2025-03-06 ENCOUNTER — APPOINTMENT (OUTPATIENT)
Dept: BEHAVIORAL HEALTH | Facility: CLINIC | Age: 35
End: 2025-03-06
Payer: COMMERCIAL

## 2025-03-06 DIAGNOSIS — F33.0 MILD EPISODE OF RECURRENT MAJOR DEPRESSIVE DISORDER (CMS-HCC): ICD-10-CM

## 2025-03-06 DIAGNOSIS — F43.10 PTSD (POST-TRAUMATIC STRESS DISORDER): ICD-10-CM

## 2025-03-06 DIAGNOSIS — F42.8 OBSESSIVE THINKING: ICD-10-CM

## 2025-03-06 DIAGNOSIS — F41.1 ANXIETY, GENERALIZED: Primary | ICD-10-CM

## 2025-03-06 DIAGNOSIS — F51.04 PSYCHOPHYSIOLOGICAL INSOMNIA: ICD-10-CM

## 2025-03-06 PROCEDURE — 99417 PROLNG OP E/M EACH 15 MIN: CPT | Performed by: NURSE PRACTITIONER

## 2025-03-06 PROCEDURE — 1036F TOBACCO NON-USER: CPT | Performed by: NURSE PRACTITIONER

## 2025-03-06 PROCEDURE — 99215 OFFICE O/P EST HI 40 MIN: CPT | Performed by: NURSE PRACTITIONER

## 2025-03-06 RX ORDER — HYDROXYZINE HYDROCHLORIDE 25 MG/1
25 TABLET, FILM COATED ORAL SEE ADMIN INSTRUCTIONS
Qty: 360 TABLET | Refills: 3 | Status: SHIPPED | OUTPATIENT
Start: 2025-03-06 | End: 2026-03-06

## 2025-03-06 RX ORDER — ACETAMINOPHEN 325 MG/1
325 TABLET ORAL EVERY 6 HOURS PRN
COMMUNITY

## 2025-03-06 RX ORDER — AMITRIPTYLINE HYDROCHLORIDE 50 MG/1
50 TABLET, FILM COATED ORAL NIGHTLY
Qty: 90 TABLET | Refills: 3 | Status: SHIPPED | OUTPATIENT
Start: 2025-03-06 | End: 2026-03-06

## 2025-03-06 ASSESSMENT — PATIENT HEALTH QUESTIONNAIRE - PHQ9
4. FEELING TIRED OR HAVING LITTLE ENERGY: SEVERAL DAYS
10. IF YOU CHECKED OFF ANY PROBLEMS, HOW DIFFICULT HAVE THESE PROBLEMS MADE IT FOR YOU TO DO YOUR WORK, TAKE CARE OF THINGS AT HOME, OR GET ALONG WITH OTHER PEOPLE: NOT DIFFICULT AT ALL
8. MOVING OR SPEAKING SO SLOWLY THAT OTHER PEOPLE COULD HAVE NOTICED. OR THE OPPOSITE, BEING SO FIGETY OR RESTLESS THAT YOU HAVE BEEN MOVING AROUND A LOT MORE THAN USUAL: NOT AT ALL
6. FEELING BAD ABOUT YOURSELF - OR THAT YOU ARE A FAILURE OR HAVE LET YOURSELF OR YOUR FAMILY DOWN: NOT AT ALL
7. TROUBLE CONCENTRATING ON THINGS, SUCH AS READING THE NEWSPAPER OR WATCHING TELEVISION: SEVERAL DAYS
3. TROUBLE FALLING OR STAYING ASLEEP OR SLEEPING TOO MUCH: SEVERAL DAYS
5. POOR APPETITE OR OVEREATING: SEVERAL DAYS
2. FEELING DOWN, DEPRESSED OR HOPELESS: NOT AT ALL
9. THOUGHTS THAT YOU WOULD BE BETTER OFF DEAD, OR OF HURTING YOURSELF: NOT AT ALL
1. LITTLE INTEREST OR PLEASURE IN DOING THINGS: NOT AT ALL

## 2025-03-06 ASSESSMENT — ANXIETY QUESTIONNAIRES
2. NOT BEING ABLE TO STOP OR CONTROL WORRYING: SEVERAL DAYS
7. FEELING AFRAID AS IF SOMETHING AWFUL MIGHT HAPPEN: MORE THAN HALF THE DAYS
4. TROUBLE RELAXING: SEVERAL DAYS
3. WORRYING TOO MUCH ABOUT DIFFERENT THINGS: SEVERAL DAYS
6. BECOMING EASILY ANNOYED OR IRRITABLE: SEVERAL DAYS
IF YOU CHECKED OFF ANY PROBLEMS ON THIS QUESTIONNAIRE, HOW DIFFICULT HAVE THESE PROBLEMS MADE IT FOR YOU TO DO YOUR WORK, TAKE CARE OF THINGS AT HOME, OR GET ALONG WITH OTHER PEOPLE: NOT DIFFICULT AT ALL
5. BEING SO RESTLESS THAT IT IS HARD TO SIT STILL: NOT AT ALL
GAD7 TOTAL SCORE: 9
1. FEELING NERVOUS, ANXIOUS, OR ON EDGE: NEARLY EVERY DAY

## 2025-03-06 NOTE — PROGRESS NOTES
"Outpatient Psychiatry    Subjective   Alba Mendez, a 34 y.o. female, presenting to Psychiatry for evaluation.  Patient is referred by Josefa Parikh MD \"I am ok. I've noticed that I have been more anxious lately. Adjusting to life now with 3 kids, as having 3 is way different than 2. But I am embracing it.\"      Virtual Consent    An interactive audio and video telecommunication system which permits real time communications between the patient (at home) and provider (at home office) was utilized to provide this telehealth service.   Verbal consent was requested and obtained from Alba Mendez on this date, 03/06/25 for a telehealth visit and the patient's location was confirmed at the time of the visit.     HPI:    Present Illness - anxiety    Onset/timeframe - started 23 years ago, but recently spiked again 6 months ago (brother's health),   Type - anxiety  Duration - daily  Characteristics/Recent psychiatric symptoms (pertinent positives and negatives) - reports being back after 21 month absence, having her 3rd child, her son (Milagro) is a lot to manage, now having him, is a lot to manage 'but I am accepting it.' Anxiety is much higher than usual as well, as her paternal half-brother who has stopped talking with the rest of the family and only talks to her, as he has burdened her with everything he is dealing with - he is homeless (wife has estranged herself from him), no job, lost extreme amounts of weight - emaciated, drug abuse (crack cocaine use) and sadly demonstrating other poor behaviors/judgements. Feels very sad for his life/situation but is frustrated with his reluctance and not reaching out to their other half siblings, and cousins 'as he is embarrassed by his appearance.' Reports upset with her oldest daughter's father as 'he is a piece of shit. I would spit on him if I could. He is full of false promises. After she spent the summer with him last year and he promised he would get her " school clothes and books, he didn't follow through.' Reports he did not call her for the holidays nor her birthday this past weekend, on her birthday and only sent her $50 with a brief note, not even a birthday card. Reports he recently tried to manipulate their daughter after she had to reach out to his mother, her daughter's grandmother about getting him to call her granddaughter, and he ended up attempting to gas lighting her daughter into feeling bad for him with why he has been absent and the patient took over the phone conversation and cursed him out for lying to their daughter and exposed him for the lies that he has shown her all these years. Admits that she didn't want her daughter to see this, but feels her daughter is now old enough to see the truth and this entire scenario has also frustrated her. Her daughter now can only contact him via his gf's niece as he has no direct way to contact and this entire situation is very upsetting her so 'much that I can't stand him. I never wanted to talk bad about him around her, when she was a child, but now not anymore.' Denies depression overall.  Reports she and her partner bought a home together, and all 5 of them now live in it (renovated the entire house) - their forever home. Work stress is manageable, especially as she has a very supportive boss. Sleep is interrupted - sometimes d/t having a 14 month old, but also 'I just wake up for no particular reason. I don't know and my mind is on and I just lay there.' Sleep averages 4-6 hrs. Appetite is on the poorer side, but lately is impacted by the increased anxiety which then triggers her IBS. Reports racing, worrying thoughts about her brother and his situation which is a huge part of her anxiety, but denies obsessive or ruminating thoughts. Trauma hx: Ex- emotional neglect, and dealing with half brother who is dealing with his own mental health/substance issues and putting her through traumatic strain of  her own as he is only relying on her for help and it is too much for her to handle.  Aggravating and/or relieving factors/triggers  Treatment and treatment changes (new meds, dosage increases or decreases, med compliance, therapy frequency, etc.) (Past and Recent) see below    Background history:    Family - She reports her parents were never  and lived with her mother, brother (currently 44 yo.) and grandmother while growing up. She does report she was close to her family and close to her father (even though never ), and his death (52, aneuryism and stroke) when she was 13 caused her first issues with depression. She was the primary caregiver for both her mother ( 10 years ago - COPD, CHF) and grandmother ( 9 years ago - dementia) during her high schools years, d/t health issues and her brother had moved out already. She has 1/2 brother (48 yo.) and reports being close to her brothers.     Relationships - Oldest daughter (14) and daughter (6.5) are doing well. Now has a 14 month old son. Engaged currently to Guanakito (50). Close to family members. Gets along with coworkers and her boss at work is very supportive.  from 1352-3049 and was in a physically, emotionally and verbally and has no contact with her ex.    Issues: Denies SI/HI/AVH currently.      Psychiatric Review Of Systems:  Depressive Symptoms: appetite decreased, concentration, energy, and sleep decreased   Manic Symptoms: negative  Anxiety Symptoms: General Anxiety Disorder (JANY)JANY Behaviors: difficult to control worry, excessive anxiety/worry, difficulty concentrating, irritability, restlessness, and dread, Obsessive Compulsive Disorder (OCD)OCD Behaviors: repetitive thoughts, and Post Traumatic Stress Disorder (PTSD)PTSD: traumatic event, avoidance of stimuli associated with event, persistent symptoms of increased arousal, and irritability  Psychotic Symptoms: negative  Other Symptoms:    Current  Medications:    Current Outpatient Medications:     acetaminophen (TylenoL) 325 mg tablet, Take 1 tablet (325 mg) by mouth every 6 hours if needed for mild pain (1 - 3) or headaches., Disp: , Rfl:     albuterol (ProAir HFA) 90 mcg/actuation inhaler, Inhale 2 puffs every 4 hours if needed for other (cough)., Disp: 8.5 g, Rfl: 0    dicyclomine (Bentyl) 10 mg capsule, TAKE 1 CAPSULE (10 MG) BY MOUTH 4 TIMES A DAY AS NEEDED (ABDOMINAL CRAMPING)., Disp: 360 capsule, Rfl: 1    amitriptyline (Elavil) 50 mg tablet, Take 1 tablet (50 mg) by mouth once daily at bedtime., Disp: 90 tablet, Rfl: 3    etonogestreL-ethinyl estradioL (Nuvaring) 0.12-0.015 mg/24 hr vaginal ring, Insert 1 each into the vagina every 28 (twenty-eight) days. Insert vaginal ring for 3 weeks, then remove for 1 week. (Patient not taking: Reported on 3/6/2025), Disp: 3 each, Rfl: 4    hydrOXYzine HCL (Atarax) 25 mg tablet, Take 1 tablet (25 mg) by mouth see administration instructions. Take 2 tablets in the AM, 1 tablet mid-day and 1 tablet at bedtime., Disp: 360 tablet, Rfl: 3    Medical History:  Past Medical History:   Diagnosis Date    Hypertension 08/30/2023    H/o HTN, now resolved. Last elevated BP March 2021.    Personal history of gestational diabetes     History of gestational diabetes mellitus (GDM)    Personal history of other complications of pregnancy, childbirth and the puerperium     History of pre-eclampsia    Personal history of other diseases of the respiratory system     History of asthma       Past Psychiatric History:   Diagnoses:   Previous Psychiatrist: Kishor Del Castillo, Psych NP  Therapy: SON[facility] Lai - seen 1x a month  Current psychiatric medications: none at this time.  Past psychiatric medications: Elavil, Atarax, Buspar, Propranolol  Past psychiatric treatments: see past  records  Hospitalizations: denies  Suicide attempts: denies  Family psychiatric history: paternal half brother (substance use, depression, anxiety),  mother and father (substance abuse)    Social History:   Currently lives: lives in home with kane and 3 children  Education: - denies issues with going to school (grade school through high school). She reported the anger issues for her started in the fall of her 8th grade (the time when her father passed) and it was a rough year for her in school. Grades overall were good outside of 8th grade. 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  History of Learning Problem:  Work/Finances:   for 10 years  Marital history/children:  Current stressors: brother's well-being, and raising 3 children  Social support: fidenis, brother, aunt, and another brother, uncle  Legal History: denies   History: denies  History of violence: denies  Access to Weapons: gun (locked away)  Guardian/POA/Payee:  N/A    Substance Use History:  Tobacco use: denies  Use of alcohol: denied  Use of caffeine: denies use  Use of other substances: THC for IBS management  Legal consequences of substance use: n/a  Substance use disorder treatment: n/a    Record Review: brief     Medical Review Of Systems:  Behavioral/Psych: positive for anxiety and depression    [facility]:  Kishor Del Castillo, SAMANTHA-CNP on 3/6/2025  2:56 PM  I have personally reviewed the [facility] report for Alba Mendez. I have considered the risks of abuse, dependence, addiction and diversion    Is the patient prescribed a combination of a benzodiazepine and opioid?  No    Last Urine Drug Screen / ordered today: No  No results found for this or any previous visit (from the past 8760 hours).  N/A    Clinical rationale for not completing a Urine Drug Screen: N/A      Controlled Substance Agreement:  Date of the Last Agreement: N/A    Objective   Mental Status Exam  Appearance: Well-groomed  Attitude: Calm,  cooperative, somewhat distracted by her baby boy but engaged in conversation.  Behavior: Appropriate eye contact.   Motor Activity: No psychomotor agitation or retardation. No abnormal movements, tremors or tics. No evidence of extrapyramidal symptoms or tardive dyskinesia.  Speech: Regular rate, rhythm, volume. Spontaneous, no pressured speech.  Mood: 'anxious'  Affect: Dysphoric, constricted but reactive, anxious yet mood congruent.  Thought Process: Linear, logical, and goal-directed, obsessive, racing. No loose associations or gross thought disorganization.  Thought Content: Denied current suicidal ideation or thoughts of harm to self, denied homicidal ideation or thoughts of harm to others. No delusional thinking elicited. No perseverations or obsessions identified. Raising 3 kids is a lot for her to handle but she is making the most of the situation but biggest stressor is her brother who's ongoing substance use is upsetting her and requiring her to put up a higher boundary than before.  Perception: Did not endorse auditory or visual hallucinations, did not appear to be responding to hallucinatory stimuli.   Cognition: Alert, oriented x3. Preserved attention span and concentration, recent and remote memory. Adequate fund of knowledge. No deficits in language.   Insight: Fair, in regards to understanding mental health condition  Judgement: Fair      JANY-7/PHQ-9 scores reviewed: 9, 4 reflecting mild anxiety and well-controlled depression even without medications.    Other Objective Information:  Labs not ordered nor required.      Risk Assessment:  Risk of harm to self: Low Risk -- Risk factors include: No significant risk factors identified on screening Protective factors include:Denies current suicidal ideation and Denies history of suicide attempts     Risk of harm to others: Low Risk - Risk factors include: No significant risk factors identified on screening. Protective factors include: Lack of known history  of harm to others  and Lack of known history of violent ideation     PSYCHOTHERAPY:  Plan: goals, type of therapy/modality, mental status when leaving, dx, time, seeing therapist already     Diagnoses and all orders for this visit:  Anxiety, generalized (Primary)  -     hydrOXYzine HCL (Atarax) 25 mg tablet; Take 1 tablet (25 mg) by mouth see administration instructions. Take 2 tablets in the AM, 1 tablet mid-day and 1 tablet at bedtime.  Mild episode of recurrent major depressive disorder (CMS-HCC)  -     amitriptyline (Elavil) 50 mg tablet; Take 1 tablet (50 mg) by mouth once daily at bedtime.  Psychophysiological insomnia       Plan/Recommendations:  Medications: Starts taking Hydroxyzine (Atarax) 25mg (50mg in the AM, 25mg mid-day and 25mg at bedtime). Starts taking Amitriptyline (Elavil) 50mg at bedtime.  Orders: Patient is back after a 19 month absence. Cooperative if not anxious. Reviewed and agreed to restarting Elavil and Atarax. Continues therapy with current therapist already in place. Restarting treatment plan.  Follow up: 05/07/2025  Call  Psychiatry at [phone] with issues.  For [facility] residents, [facility] is a 24/7 hotline you can call for assistance [[phone]]. Please call [phone] or go to your closest Emergency Room if you feel unsafe. This includes thoughts of hurting yourself or anyone else, or having other troubles such as hearing voices, seeing visions, or having new and scary thoughts about the people around you.    Review with patient: Treatment plan reviewed with the patient.  Medication risks/benefit reviewed with the patient  Time Spent:    Prep time: 1 min.  Direct patient time: 60 min.  Documentation time: 9 min.  Total time: 70 min.    Kishor Del Castillo, APRN-CNP

## 2025-03-11 NOTE — PROGRESS NOTES
Nutrition Assessment     Reason for Visit:  Alba Mendez is a 34 y.o. female seen 11/9/23 for telephone Nutrition consult; gestational diabetes.    Archived Note    Food And Nutrient Intake:  Per pt:   FBG: upper 90s. Highest 116  B: 87 with alfaro, eggs and small smoothie  L: 157 w/ chicken tenders, small amt of fries, BBQ sauce, small gingerale  D: 138  Not eating much before bed.  Drinking plenty of water.     Energy Needs  *Tracking prenatal weight gain is the recommended method of determining adequacy of caloric intake.*    1st trimester EER = Nonpregnant EER + 0 kcal   2nd trimester EER = Nonpregnant EER + 340 kcal ( 400 kcal if underweight pre-pregnancy)  3rd trimester EER = Nonpregnant EER + 452 kcal  (600 kcal if underweight)    Nutrition Diagnosis      Nutrition Diagnosis  Patient has Nutrition Diagnosis: Yes  Additional Assessment Information (1): Altered nutrition-related laboratory values (diagnostic, fingerstick or CGM data) r/t diabetes in pregnancy, as evidenced by fasting and/or postprandial blood glucose levels outside of target ranges.    Nutrition Interventions/Recommendations   General Recommendations     Daily carbohydrate distribution:     Breakfast: 1-2 carbohydrate servings (15-30 grams of carbohydrate) -- avoid concentrated sweets/high GI foods: fruit, fruit juices, cold cereal and milk, syrup, jams and jellies, etc.   AM Snack: 1-2 servings   Lunch: 3-4 servings (45-60g)   Midday Snack: 1-2 servings   Dinner: 3-4 servings   HS Snack: 1-2 servings (15-30g)     -Be sure to add protein food to carb choices at each meal and snack: cheese, meat, eggs, nuts, peanut butter, etc     -During the day, eat about every 2 - 4 hours. Avoid very long periods without eating.     -Avoid more than 8-10 hours overnight without eating. If more than about 2hrs between dinner and bedtime, have a carb plus protein snack. Ex: Tricuits and cheese or a half-sandwich on whole wheat bread.     -Higher fiber  "carbohydrates/whole grains are recommended over refined carb choices. Ex: 100% whole wheat breads, brown rice, Triscuits, popcorn.     -Avoid juices and sugar-sweetened beverages. Diet and \"zero\" drinks are fine. Try eliminating milk at mealtime if noticing after-meal BG elevations. Unsweetened almond or soymilk beverages are a lower-carb dairy alternative.     -Recommended Dietary Allowance for carbohydrates during pregnancy is a minimum of 175g/day (11-12 servings) to provide 33g/day for fetal brain development. Many women do well eating a bit less than 175g CHO/day--this is fine, as long as there is no purposeful, severe restriction of carb foods at meals and snacks (ex: Keto or Atkins-style diets for BG control) and no symptoms indicating inadequate carb intake-- loss of weight, feeling dizzy/lightheaded, irritable, confused, excessively hungry, etc.     Patient Education Material emailed:   Diet for Gestational Diabetes handout      Nutrition Monitoring and Evaluation   Biochemical Data, Medical Tests and Procedures  Monitoring and Evaluation Plan: Glucose/endocrine profile  Glucose/Endocrine Profile: Glucose, fasting, Other (Comment)  Criteria: Fasting BG 95 or less; 1hr post-meal  or less. [facility]. Follow-up as needed to assess goal attainment. Modifications based on further assessment and self-blood glucose monitoring results. Pt expresses understanding and agreement.      Time spent directly with patient, family or caregiver: 17 minutes  Documentation Time: 10 minutes              "

## 2025-04-06 ENCOUNTER — DOCUMENTATION (OUTPATIENT)
Facility: CLINIC | Age: 35
End: 2025-04-06
Payer: COMMERCIAL

## 2025-04-06 DIAGNOSIS — N92.0 MENORRHAGIA WITH REGULAR CYCLE: Primary | ICD-10-CM

## 2025-04-06 PROBLEM — O13.9 GESTATIONAL HYPERTENSION, UNSPECIFIED TRIMESTER (HHS-HCC): Status: RESOLVED | Noted: 2024-01-02 | Resolved: 2025-04-06

## 2025-04-06 PROBLEM — O09.899 RUBELLA NON-IMMUNE STATUS, ANTEPARTUM (HHS-HCC): Status: RESOLVED | Noted: 2023-12-26 | Resolved: 2025-04-06

## 2025-04-06 PROBLEM — Z28.39 RUBELLA NON-IMMUNE STATUS, ANTEPARTUM (HHS-HCC): Status: RESOLVED | Noted: 2023-12-26 | Resolved: 2025-04-06

## 2025-04-06 PROBLEM — Z87.59 HISTORY OF PRE-ECLAMPSIA: Status: RESOLVED | Noted: 2024-01-02 | Resolved: 2025-04-06

## 2025-04-06 PROBLEM — R51.9 POSTPARTUM HEADACHE (HHS-HCC): Status: RESOLVED | Noted: 2024-01-02 | Resolved: 2025-04-06

## 2025-04-06 PROBLEM — O34.219 PREVIOUS CESAREAN DELIVERY, DELIVERED (HHS-HCC): Status: RESOLVED | Noted: 2024-01-02 | Resolved: 2025-04-06

## 2025-04-06 RX ORDER — TRANEXAMIC ACID 650 MG/1
1300 TABLET ORAL 3 TIMES DAILY
Qty: 30 TABLET | Refills: 3 | Status: SHIPPED | OUTPATIENT
Start: 2025-04-06

## 2025-04-06 RX ORDER — IBUPROFEN 600 MG/1
600 TABLET ORAL EVERY 6 HOURS PRN
Qty: 60 TABLET | Refills: 3 | Status: SHIPPED | OUTPATIENT
Start: 2025-04-06 | End: 2025-04-16

## 2025-04-06 NOTE — PROGRESS NOTES
The patient paged me on 4/6/2025 regarding a very heavy menses.  She is not on contraception.  She does not want to conceive.  She states her cycles can be heavy but this cycle is particularly heavier.  She does states she changes her pad every 4 hours which is not her typical flow.  She denies pregnancy.  Has mild cramping.    No fevers or chills or other symptoms.    We discussed managing the heaviest flow with ibuprofen 600 mg every 6 hours and adding Tylenol and heat for cramping.  I did mention tranexamic acid for the heaviest days.  A pelvic ultrasound was ordered.    I recommend taking a multivitamin with iron to minimize anemia.  Since this is a one month episode of heavy flow, she will contact me if there is a change in her cycle pattern.  She is reluctant to start contraception due to side effects, but did recommend considering a progesterone-containing IUD such as Mirena or Liletta.

## 2025-05-07 ENCOUNTER — APPOINTMENT (OUTPATIENT)
Dept: BEHAVIORAL HEALTH | Facility: CLINIC | Age: 35
End: 2025-05-07
Payer: COMMERCIAL

## 2025-06-25 NOTE — SIGNIFICANT EVENT
Follow-up Phone Call Note:   Interview:  Care Type: Women's Health   Phone Number Call  [phone]   Call Outcome: Connected with patient   Patient Reports Feeling (symptoms) Are: better   Which Meds Were New Meds:  Yes   Which Meds to Continue:  No   Which Meds to Stop: no medications were discontinued   Who participated in medication reconciliation with the hospital staff?: you   In your professional opinion do you think there was a medication discrepancy or potential for medication discrepancy in this situation?: no   Medication Issues: no medication issues   Discharge Instructions Clear:   Yes   Patient Has a Primary Care Provider:     Yes   Post-hospitalization Follow-up Occurred According To Schedule:    No   Reason: appointment scheduled in future   Delivered Baby(ies): Yes   Chest Pain:  No   Shortness of breath or difficulty breathing:  No   Seizures:  No   Any thoughts of hurting yourself or your baby:   No   Bleeding that is soaking through one pad/hour or blood clots the size of an egg or bigger:  No   Incision that is not healing:  No   Red or swollen leg that is painful or warm to the touch:  No   Temperature of 100.4F or higher:  No   Headache that does not get better, even after taking medicine, or a bad headache with vision changes:  No      PP Readmit for sPEC                  Mom's Discharge Date: 1/5/24      Patient Has Plan Specific Name And Number Of Who To Call For Concerns:  Yes   Stroke Patient:  No  Comments:    Date/Time Of Call: 1/8/24 at 1314   Call Back Done By: care coordinator   Callback Complete:  Yes        PAST SURGICAL HISTORY:  Ear disease Left inner ear surgery, 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

## 2025-07-23 ENCOUNTER — TELEMEDICINE (OUTPATIENT)
Dept: BEHAVIORAL HEALTH | Facility: CLINIC | Age: 35
End: 2025-07-23
Payer: COMMERCIAL

## 2025-07-23 DIAGNOSIS — F43.20 GRIEF REACTION: ICD-10-CM

## 2025-07-23 DIAGNOSIS — F51.04 PSYCHOPHYSIOLOGICAL INSOMNIA: ICD-10-CM

## 2025-07-23 DIAGNOSIS — F33.0 MILD EPISODE OF RECURRENT MAJOR DEPRESSIVE DISORDER: ICD-10-CM

## 2025-07-23 DIAGNOSIS — F41.1 GAD (GENERALIZED ANXIETY DISORDER): Primary | ICD-10-CM

## 2025-07-23 DIAGNOSIS — F42.8 OBSESSIVE THINKING: ICD-10-CM

## 2025-07-23 PROCEDURE — 1036F TOBACCO NON-USER: CPT | Performed by: NURSE PRACTITIONER

## 2025-07-23 PROCEDURE — 99214 OFFICE O/P EST MOD 30 MIN: CPT | Performed by: NURSE PRACTITIONER

## 2025-07-23 RX ORDER — BUSPIRONE HYDROCHLORIDE 7.5 MG/1
7.5 TABLET ORAL 2 TIMES DAILY
Qty: 180 TABLET | Refills: 3 | Status: SHIPPED | OUTPATIENT
Start: 2025-07-23 | End: 2026-07-23

## 2025-07-23 RX ORDER — AMITRIPTYLINE HYDROCHLORIDE 50 MG/1
100 TABLET, FILM COATED ORAL NIGHTLY
Qty: 180 TABLET | Refills: 3 | Status: SHIPPED | OUTPATIENT
Start: 2025-07-23 | End: 2026-07-23

## 2025-07-23 ASSESSMENT — PATIENT HEALTH QUESTIONNAIRE - PHQ9
4. FEELING TIRED OR HAVING LITTLE ENERGY: SEVERAL DAYS
7. TROUBLE CONCENTRATING ON THINGS, SUCH AS READING THE NEWSPAPER OR WATCHING TELEVISION: MORE THAN HALF THE DAYS
2. FEELING DOWN, DEPRESSED OR HOPELESS: SEVERAL DAYS
9. THOUGHTS THAT YOU WOULD BE BETTER OFF DEAD, OR OF HURTING YOURSELF: NOT AT ALL
10. IF YOU CHECKED OFF ANY PROBLEMS, HOW DIFFICULT HAVE THESE PROBLEMS MADE IT FOR YOU TO DO YOUR WORK, TAKE CARE OF THINGS AT HOME, OR GET ALONG WITH OTHER PEOPLE: SOMEWHAT DIFFICULT
5. POOR APPETITE OR OVEREATING: SEVERAL DAYS
8. MOVING OR SPEAKING SO SLOWLY THAT OTHER PEOPLE COULD HAVE NOTICED. OR THE OPPOSITE, BEING SO FIGETY OR RESTLESS THAT YOU HAVE BEEN MOVING AROUND A LOT MORE THAN USUAL: NOT AT ALL
1. LITTLE INTEREST OR PLEASURE IN DOING THINGS: MORE THAN HALF THE DAYS
3. TROUBLE FALLING OR STAYING ASLEEP OR SLEEPING TOO MUCH: NEARLY EVERY DAY
6. FEELING BAD ABOUT YOURSELF - OR THAT YOU ARE A FAILURE OR HAVE LET YOURSELF OR YOUR FAMILY DOWN: NOT AT ALL

## 2025-07-23 ASSESSMENT — ANXIETY QUESTIONNAIRES
IF YOU CHECKED OFF ANY PROBLEMS ON THIS QUESTIONNAIRE, HOW DIFFICULT HAVE THESE PROBLEMS MADE IT FOR YOU TO DO YOUR WORK, TAKE CARE OF THINGS AT HOME, OR GET ALONG WITH OTHER PEOPLE: SOMEWHAT DIFFICULT
2. NOT BEING ABLE TO STOP OR CONTROL WORRYING: MORE THAN HALF THE DAYS
3. WORRYING TOO MUCH ABOUT DIFFERENT THINGS: MORE THAN HALF THE DAYS
5. BEING SO RESTLESS THAT IT IS HARD TO SIT STILL: NEARLY EVERY DAY
GAD7 TOTAL SCORE: 19
4. TROUBLE RELAXING: NEARLY EVERY DAY
7. FEELING AFRAID AS IF SOMETHING AWFUL MIGHT HAPPEN: NEARLY EVERY DAY
6. BECOMING EASILY ANNOYED OR IRRITABLE: NEARLY EVERY DAY
1. FEELING NERVOUS, ANXIOUS, OR ON EDGE: NEARLY EVERY DAY

## 2025-07-23 NOTE — PROGRESS NOTES
"Outpatient Psychiatry    Subjective   Alba Mendez, a 35 y.o. female, presenting to Psychiatry for evaluation.  Patient is referred by Josefa Parikh MD \"I am a mess. I had a couple deaths recently - I lost my god brother to cancer, a family member - he, and his wife were murdered on the  - were shot and killed and left behind an 8 year old. We are just planning their memorial now. So I've been all over the place. Anxiety through the roof. I am not sleeping well either. My brain is constantly going. And my god brother's mom just had back surgery and she is a huge part of of my life, and she had complications from the surgery and she became septic. It's a lot.\"      Virtual Consent    An interactive audio and video telecommunication system which permits real time communications between the patient (at home) and provider (at home office) was utilized to provide this telehealth service.   Verbal consent was requested and obtained from Alba Mendez on this date, 25 for a telehealth visit and the patient's location was confirmed at the time of the visit.     HPI:    Present Illness - anxiety     Characteristics/Recent psychiatric symptoms (pertinent positives and negatives) - reports experiencing intense grief, followed by PTSD and major anxiety and sadness currently, especially with knowing her cousin's 7 yo son has no parents as he and his wife were killed by gunshot and he is now an orphan and she feels such sadness for him. At least the child has both sets of grandmothers to take care of him but knowing this still leaves her feeling sad. Reports having lost her god-brother whom she loved dearly and  3 months ago and then his mother who has lupus and had to have back surgery recently, but then had complications and ended up with sepsis, and at the age of 70 is not good, but is now stable, has left her feeling on edge. Reports feeling anxious, worried, sad, but admits noticing her " mind is filled with racing thoughts about 'anything and everything', denying obsessive thoughts, but is ruminating about her losses that she has experienced, all in such a short period of time. Reports her kids are doing well, tho her daughter Sudha (turning 7 in November), may have an autoimmune disease (easily sick, allergies are increasing). Reports her son Milagro (turning 2) is doing well. Reports  is doing well. Reports her 13 yo is a teenager and starting 9th grade and doing well and often lazy. Reports her 1/2 brother is in a treatment program but something came up from his past, and discharged him early because of this issue but they are making sure he will find placement where he can continue treatment elsewhere's safely and he is 60 days in. 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 Reports issues with sleep being 'very broken and scattered' d/t racing thoughts interrupting her sleep and getting from 4 to 6 hrs (which is a 'great' nights rest). Wakes up tired every morning. Energy levels are ok. Physical fatigue is 'not bad' but mental fatigue is definitely there, 'it is a struggle.' Appetite has been 'decent. I've been able to eat.' IBS is 'not stable. I could say I am expecting it d/t everything I am dealing with now, is triggering it, stress-wise.' Denies obsessive, or intrusive thoughts.    Onset/timeframe - 3 months  Type - anxiety  Duration - daily  Aggravating and/or relieving factors/triggers - cancer death of godbrother, murder of cousin and his wife, and godbrother's mother recent medical scare while in hospital  Treatment and treatment changes (new meds, dosage increases or decreases, med compliance, therapy frequency, etc.) (Past and Recent) see below      Psychiatric Review Of Systems:  Depressive Symptoms: anhedonia, appetite decreased,  concentration, energy, hopeless, and sleep decreased   Manic Symptoms: negative  Anxiety Symptoms: General Anxiety Disorder (JANY)JANY Behaviors: difficult to control worry, excessive anxiety/worry, difficulty concentrating, easily fatigued, irritability, restlessness, sleep disturbance, and dread, Obsessive Compulsive Disorder (OCD)OCD Behaviors: repetitive thoughts and racing thoughts, and Post Traumatic Stress Disorder (PTSD)PTSD: traumatic event, avoidance of stimuli associated with event, persistent symptoms of increased arousal, hypervigilance, and irritability  Psychotic Symptoms: negative  Other Symptoms:    Current Medications:  Current Medications[1]    Medical History:  Medical History[2]    Substance Use History:  Tobacco use: denies  Use of alcohol: occasional, social use  Use of caffeine: denies use  Use of other substances: THC for IBS pain  Legal consequences of substance use: n/a  Substance use disorder treatment: n/a    Record Review: brief     Medical Review Of Systems:  Behavioral/Psych: positive for anxiety, decreased appetite, depression, irritability, and sleep disturbance    OARRS:  Kishor Del Castillo, APRN-CNP on 7/23/2025 11:15 PM  I have personally reviewed the OARRS report for Alba Mendez. I have considered the risks of abuse, dependence, addiction and diversion    Is the patient prescribed a combination of a benzodiazepine and opioid?  No    Last Urine Drug Screen / ordered today: No  No results found for this or any previous visit (from the past 8760 hours).  N/A    Clinical rationale for not completing a Urine Drug Screen: N/A      Controlled Substance Agreement:  Date of the Last Agreement: N/A    Objective   Mental Status Exam  Appearance: Well-groomed  Attitude: Calm, cooperative, somewhat distracted but engaged in conversation.  Behavior: Appropriate eye contact.   Motor Activity: No psychomotor agitation or retardation. No abnormal movements, tremors or tics. No evidence of  extrapyramidal symptoms or tardive dyskinesia.  Speech: Regular rate, rhythm, volume. Spontaneous, no pressured speech.  Mood: 'very anxious'  Affect: Dysphoric, constricted but reactive, anxious yet mood congruent.  Thought Process: Linear, logical, and goal-directed, obsessive, racing, ruminating. No loose associations or gross thought disorganization.  Thought Content: Denied current suicidal ideation or thoughts of harm to self, denied homicidal ideation or thoughts of harm to others. No delusional thinking elicited. No perseverations or obsessions identified. 3 deaths (2 violently just 3 weeks ago) and feels herself emotionally overwhelmed, grieving and on edge.  Perception: Did not endorse auditory or visual hallucinations, did not appear to be responding to hallucinatory stimuli.   Cognition: Alert, oriented x3. Preserved attention span and concentration, recent and remote memory. Adequate fund of knowledge. No deficits in language.   Insight: Fair, in regards to understanding mental health condition  Judgement: Fair        JANY-7/PHQ-9 scores reviewed: 19, 10 compared to  9, 4 reflecting a jump in anxiety and small increase in depression.    Vitals:  There were no vitals filed for this visit.    Other Objective Information:  Labs not ordered      Risk Assessment:  Risk of harm to self: Low Risk -- Risk factors include: No significant risk factors identified on screening Protective factors include:Denies current suicidal ideation, Denies history of suicide attempts , Future-oriented talk , Willingness to seek help and support , Current/history of good response to treatment/meds , and Interpersonal relationships and supports, e.g., family, friends, peers, community     Risk of harm to others: Low Risk - Risk factors include: No significant risk factors identified on screening. Protective factors include: Lack of known history of harm to others  and Lack of known history of violent ideation      PSYCHOTHERAPY:  Plan: goals, type of therapy/modality, mental status when leaving, dx, time, needs to restart seeing Michelle LAWSON with [facility] for therapy.     Alba was seen today for mdd (major depressive disorder), anxiety, follow-up, med management, sleeping problem, ptsd (post-traumatic stress disorder) and obsessive thinking.  Diagnoses and all orders for this visit:  JANY (generalized anxiety disorder) (Primary)  -     busPIRone (Buspar) 7.5 mg tablet; Take 1 tablet (7.5 mg) by mouth 2 times a day.  Mild episode of recurrent major depressive disorder  -     amitriptyline (Elavil) 50 mg tablet; Take 2 tablets (100 mg) by mouth once daily at bedtime.  Psychophysiological insomnia  -     amitriptyline (Elavil) 50 mg tablet; Take 2 tablets (100 mg) by mouth once daily at bedtime.  Obsessive thinking  Grief reaction       Plan/Recommendations:  Medications: Continues taking Hydroxyzine (Atarax) 25mg (50mg in the AM, 25mg mid-day and 25mg at bedtime). Increases taking Amitriptyline (Elavil) 50mg to 100mg at bedtime. Starts taking Buspar 7.5mg twice daily.  Orders: Patient anxiety and depression, stemming from grief over losing 3 important in her life over the past 3 months, but more recently a cousin and his wife to murder, and almost losing her god-mother to a medical event while hospitalized. Reviewed and agreed to adding on Buspar for anxiety, and increase her Elavil for depression and sleep, while encouraging her to restart therapy. Reviewed and agreed that no other changes to her medications or treatment plan were needed at this time.   Follow up: 09/03  Call  Psychiatry at [phone] with issues.  For [facility] residents, [facility] [employer] is a 24/7 hotline you can call for assistance [[phone]]. Please call [phone] or go to your closest Emergency Room if you feel unsafe. This includes thoughts of hurting yourself or anyone else, or having other troubles such as hearing voices, seeing visions, or  having new and scary thoughts about the people around you.    Review with patient: Treatment plan reviewed with the patient.  Medication risks/benefit reviewed with the patient  Time Spent:    Prep time: 1 min.  Direct patient time: 30 min.  Documentation time: 6 min.  Total time: 37 min.    Kishor Del Castillo, APRN-CNP         [1]   Current Outpatient Medications:     acetaminophen (TylenoL) 325 mg tablet, Take 1 tablet (325 mg) by mouth every 6 hours if needed for mild pain (1 - 3) or headaches., Disp: , Rfl:     albuterol (ProAir HFA) 90 mcg/actuation inhaler, Inhale 2 puffs every 4 hours if needed for other (cough)., Disp: 8.5 g, Rfl: 0    amitriptyline (Elavil) 50 mg tablet, Take 2 tablets (100 mg) by mouth once daily at bedtime., Disp: 180 tablet, Rfl: 3    busPIRone (Buspar) 7.5 mg tablet, Take 1 tablet (7.5 mg) by mouth 2 times a day., Disp: 180 tablet, Rfl: 3    dicyclomine (Bentyl) 10 mg capsule, TAKE 1 CAPSULE (10 MG) BY MOUTH 4 TIMES A DAY AS NEEDED (ABDOMINAL CRAMPING)., Disp: 360 capsule, Rfl: 1    hydrOXYzine HCL (Atarax) 25 mg tablet, Take 1 tablet (25 mg) by mouth see administration instructions. Take 2 tablets in the AM, 1 tablet mid-day and 1 tablet at bedtime., Disp: 360 tablet, Rfl: 3    tranexamic acid (Lysteda) 650 mg tablet, Take 2 tablets (1,300 mg) by mouth 3 times a day., Disp: 30 tablet, Rfl: 3  [2]   Past Medical History:  Diagnosis Date    Gestational hypertension, unspecified trimester ([facility]) 2024    History of pre-eclampsia 2024    Hypertension 2023    H/o HTN, now resolved. Last elevated BP 2021.    Personal history of gestational diabetes     History of gestational diabetes mellitus (GDM)    Personal history of other complications of pregnancy, childbirth and the puerperium     History of pre-eclampsia    Personal history of other diseases of the respiratory system     History of asthma    Postpartum headache ([facility]) 2024    Previous  delivery,  delivered ([facility]) 01/02/2024    PTSD (post-traumatic stress disorder)

## 2025-07-24 ENCOUNTER — ANESTHESIA (OUTPATIENT)
Dept: OPERATING ROOM | Facility: HOSPITAL | Age: 35
End: 2025-07-24
Payer: COMMERCIAL

## 2025-07-24 ENCOUNTER — HOSPITAL ENCOUNTER (OUTPATIENT)
Facility: HOSPITAL | Age: 35
Setting detail: OUTPATIENT SURGERY
End: 2025-07-24
Attending: STUDENT IN AN ORGANIZED HEALTH CARE EDUCATION/TRAINING PROGRAM | Admitting: STUDENT IN AN ORGANIZED HEALTH CARE EDUCATION/TRAINING PROGRAM
Payer: COMMERCIAL

## 2025-07-24 ENCOUNTER — ANESTHESIA EVENT (OUTPATIENT)
Dept: OPERATING ROOM | Facility: HOSPITAL | Age: 35
End: 2025-07-24
Payer: COMMERCIAL

## 2025-07-24 ENCOUNTER — HOSPITAL ENCOUNTER (OUTPATIENT)
Facility: HOSPITAL | Age: 35
Discharge: HOME | End: 2025-07-24
Attending: EMERGENCY MEDICINE
Payer: COMMERCIAL

## 2025-07-24 ENCOUNTER — APPOINTMENT (OUTPATIENT)
Dept: RADIOLOGY | Facility: HOSPITAL | Age: 35
End: 2025-07-24
Payer: COMMERCIAL

## 2025-07-24 VITALS
BODY MASS INDEX: 24.55 KG/M2 | OXYGEN SATURATION: 100 % | DIASTOLIC BLOOD PRESSURE: 72 MMHG | TEMPERATURE: 97.3 F | RESPIRATION RATE: 18 BRPM | HEIGHT: 61 IN | SYSTOLIC BLOOD PRESSURE: 117 MMHG | HEART RATE: 79 BPM | WEIGHT: 130 LBS

## 2025-07-24 DIAGNOSIS — G89.18 POSTOPERATIVE PAIN: ICD-10-CM

## 2025-07-24 DIAGNOSIS — O02.1 RETAINED PRODUCTS OF CONCEPTION, EARLY PREGNANCY (HHS-HCC): Primary | ICD-10-CM

## 2025-07-24 LAB
ABO GROUP (TYPE) IN BLOOD: NORMAL
ALBUMIN SERPL BCP-MCNC: 4.8 G/DL (ref 3.4–5)
ALP SERPL-CCNC: 52 U/L (ref 33–110)
ALT SERPL W P-5'-P-CCNC: 8 U/L (ref 7–45)
ANION GAP SERPL CALC-SCNC: 13 MMOL/L (ref 10–20)
ANTIBODY SCREEN: NORMAL
APPEARANCE UR: ABNORMAL
APTT PPP: 32 SECONDS (ref 26–36)
AST SERPL W P-5'-P-CCNC: 9 U/L (ref 9–39)
B-HCG SERPL-ACNC: 7 MIU/ML
BASOPHILS # BLD MANUAL: 0 X10*3/UL (ref 0–0.1)
BASOPHILS NFR BLD MANUAL: 0 %
BILIRUB SERPL-MCNC: 0.5 MG/DL (ref 0–1.2)
BILIRUB UR STRIP.AUTO-MCNC: NEGATIVE MG/DL
BLASTS # BLD MANUAL: 0 X10*3/UL
BLASTS NFR BLD MANUAL: 0 %
BUN SERPL-MCNC: 9 MG/DL (ref 6–23)
CALCIUM SERPL-MCNC: 9.4 MG/DL (ref 8.6–10.6)
CHLORIDE SERPL-SCNC: 107 MMOL/L (ref 98–107)
CO2 SERPL-SCNC: 23 MMOL/L (ref 21–32)
COLOR UR: ABNORMAL
CREAT SERPL-MCNC: 0.59 MG/DL (ref 0.5–1.05)
EGFRCR SERPLBLD CKD-EPI 2021: >90 ML/MIN/1.73M*2
EOSINOPHIL # BLD MANUAL: 0 X10*3/UL (ref 0–0.7)
EOSINOPHIL NFR BLD MANUAL: 0 %
ERYTHROCYTE [DISTWIDTH] IN BLOOD BY AUTOMATED COUNT: 13.1 % (ref 11.5–14.5)
GLUCOSE SERPL-MCNC: 123 MG/DL (ref 74–99)
GLUCOSE UR STRIP.AUTO-MCNC: ABNORMAL MG/DL
HCT VFR BLD AUTO: 34.1 % (ref 36–46)
HGB BLD-MCNC: 12.1 G/DL (ref 12–16)
HOLD SPECIMEN: NORMAL
IMM GRANULOCYTES # BLD AUTO: 0.01 X10*3/UL (ref 0–0.7)
IMM GRANULOCYTES NFR BLD AUTO: 0.2 % (ref 0–0.9)
INR PPP: 1 (ref 0.9–1.1)
KETONES UR STRIP.AUTO-MCNC: NEGATIVE MG/DL
LEUKOCYTE ESTERASE UR QL STRIP.AUTO: NEGATIVE
LYMPHOCYTES # BLD MANUAL: 1.89 X10*3/UL (ref 1.2–4.8)
LYMPHOCYTES NFR BLD MANUAL: 44 %
MCH RBC QN AUTO: 31.2 PG (ref 26–34)
MCHC RBC AUTO-ENTMCNC: 35.5 G/DL (ref 32–36)
MCV RBC AUTO: 88 FL (ref 80–100)
METAMYELOCYTES # BLD MANUAL: 0 X10*3/UL
METAMYELOCYTES NFR BLD MANUAL: 0 %
MONOCYTES # BLD MANUAL: 0.3 X10*3/UL (ref 0.1–1)
MONOCYTES NFR BLD MANUAL: 6.9 %
MYELOCYTES # BLD MANUAL: 0 X10*3/UL
MYELOCYTES NFR BLD MANUAL: 0 %
NEUTROPHILS # BLD MANUAL: 2.11 X10*3/UL (ref 1.2–7.7)
NEUTS BAND # BLD MANUAL: 0 X10*3/UL (ref 0–0.7)
NEUTS BAND NFR BLD MANUAL: 0 %
NEUTS SEG # BLD MANUAL: 2.11 X10*3/UL (ref 1.2–7)
NEUTS SEG NFR BLD MANUAL: 49.1 %
NITRITE UR QL STRIP.AUTO: NEGATIVE
NRBC BLD MANUAL-RTO: 0 % (ref 0–0)
NRBC BLD-RTO: 0 /100 WBCS (ref 0–0)
PH UR STRIP.AUTO: 7 [PH]
PLASMA CELLS # BLD MANUAL: 0 X10*3/UL
PLASMA CELLS NFR BLD MANUAL: 0 %
PLATELET # BLD AUTO: 252 X10*3/UL (ref 150–450)
POLYCHROMASIA BLD QL SMEAR: NORMAL
POTASSIUM SERPL-SCNC: 3.7 MMOL/L (ref 3.5–5.3)
PREGNANCY TEST URINE, POC: NEGATIVE
PROMYELOCYTES # BLD MANUAL: 0 X10*3/UL
PROMYELOCYTES NFR BLD MANUAL: 0 %
PROT SERPL-MCNC: 7.5 G/DL (ref 6.4–8.2)
PROT UR STRIP.AUTO-MCNC: ABNORMAL MG/DL
PROTHROMBIN TIME: 10.5 SECONDS (ref 9.8–12.4)
RBC # BLD AUTO: 3.88 X10*6/UL (ref 4–5.2)
RBC # UR STRIP.AUTO: ABNORMAL MG/DL
RBC #/AREA URNS AUTO: >20 /HPF
RBC MORPH BLD: NORMAL
RH FACTOR (ANTIGEN D): NORMAL
SODIUM SERPL-SCNC: 139 MMOL/L (ref 136–145)
SP GR UR STRIP.AUTO: 1.02
TARGETS BLD QL SMEAR: NORMAL
TOTAL CELLS COUNTED BLD: 116
UROBILINOGEN UR STRIP.AUTO-MCNC: NORMAL MG/DL
VARIANT LYMPHS # BLD MANUAL: 0 X10*3/UL (ref 0–0.5)
VARIANT LYMPHS NFR BLD: 0 %
WBC # BLD AUTO: 4.3 X10*3/UL (ref 4.4–11.3)
WBC #/AREA URNS AUTO: ABNORMAL /HPF
WBC OTHER # BLD MANUAL: 0 X10*3/UL
WBC OTHER NFR BLD MANUAL: 0 %

## 2025-07-24 PROCEDURE — 7100000010 HC PHASE TWO TIME - EACH INCREMENTAL 1 MINUTE: Performed by: STUDENT IN AN ORGANIZED HEALTH CARE EDUCATION/TRAINING PROGRAM

## 2025-07-24 PROCEDURE — 2500000005 HC RX 250 GENERAL PHARMACY W/O HCPCS: Performed by: STUDENT IN AN ORGANIZED HEALTH CARE EDUCATION/TRAINING PROGRAM

## 2025-07-24 PROCEDURE — 36415 COLL VENOUS BLD VENIPUNCTURE: CPT | Performed by: PHYSICIAN ASSISTANT

## 2025-07-24 PROCEDURE — 76856 US EXAM PELVIC COMPLETE: CPT | Performed by: STUDENT IN AN ORGANIZED HEALTH CARE EDUCATION/TRAINING PROGRAM

## 2025-07-24 PROCEDURE — 2500000001 HC RX 250 WO HCPCS SELF ADMINISTERED DRUGS (ALT 637 FOR MEDICARE OP)

## 2025-07-24 PROCEDURE — 76856 US EXAM PELVIC COMPLETE: CPT

## 2025-07-24 PROCEDURE — 2500000004 HC RX 250 GENERAL PHARMACY W/ HCPCS (ALT 636 FOR OP/ED)

## 2025-07-24 PROCEDURE — 85730 THROMBOPLASTIN TIME PARTIAL: CPT | Performed by: PHYSICIAN ASSISTANT

## 2025-07-24 PROCEDURE — 85027 COMPLETE CBC AUTOMATED: CPT | Performed by: PHYSICIAN ASSISTANT

## 2025-07-24 PROCEDURE — 3600000008 HC OR TIME - EACH INCREMENTAL 1 MINUTE - PROCEDURE LEVEL THREE: Performed by: STUDENT IN AN ORGANIZED HEALTH CARE EDUCATION/TRAINING PROGRAM

## 2025-07-24 PROCEDURE — 3700000001 HC GENERAL ANESTHESIA TIME - INITIAL BASE CHARGE: Performed by: STUDENT IN AN ORGANIZED HEALTH CARE EDUCATION/TRAINING PROGRAM

## 2025-07-24 PROCEDURE — 3600000003 HC OR TIME - INITIAL BASE CHARGE - PROCEDURE LEVEL THREE: Performed by: STUDENT IN AN ORGANIZED HEALTH CARE EDUCATION/TRAINING PROGRAM

## 2025-07-24 PROCEDURE — 76830 TRANSVAGINAL US NON-OB: CPT | Performed by: STUDENT IN AN ORGANIZED HEALTH CARE EDUCATION/TRAINING PROGRAM

## 2025-07-24 PROCEDURE — 99285 EMERGENCY DEPT VISIT HI MDM: CPT | Mod: 25 | Performed by: EMERGENCY MEDICINE

## 2025-07-24 PROCEDURE — 81025 URINE PREGNANCY TEST: CPT | Performed by: PHYSICIAN ASSISTANT

## 2025-07-24 PROCEDURE — 80053 COMPREHEN METABOLIC PANEL: CPT | Performed by: PHYSICIAN ASSISTANT

## 2025-07-24 PROCEDURE — 84702 CHORIONIC GONADOTROPIN TEST: CPT | Performed by: PHYSICIAN ASSISTANT

## 2025-07-24 PROCEDURE — 2500000004 HC RX 250 GENERAL PHARMACY W/ HCPCS (ALT 636 FOR OP/ED): Mod: JZ

## 2025-07-24 PROCEDURE — 88305 TISSUE EXAM BY PATHOLOGIST: CPT | Mod: TC,SUR | Performed by: STUDENT IN AN ORGANIZED HEALTH CARE EDUCATION/TRAINING PROGRAM

## 2025-07-24 PROCEDURE — 7100000002 HC RECOVERY ROOM TIME - EACH INCREMENTAL 1 MINUTE: Performed by: STUDENT IN AN ORGANIZED HEALTH CARE EDUCATION/TRAINING PROGRAM

## 2025-07-24 PROCEDURE — 7100000009 HC PHASE TWO TIME - INITIAL BASE CHARGE: Performed by: STUDENT IN AN ORGANIZED HEALTH CARE EDUCATION/TRAINING PROGRAM

## 2025-07-24 PROCEDURE — 86850 RBC ANTIBODY SCREEN: CPT | Performed by: PHYSICIAN ASSISTANT

## 2025-07-24 PROCEDURE — 58120 DILATION AND CURETTAGE: CPT | Performed by: STUDENT IN AN ORGANIZED HEALTH CARE EDUCATION/TRAINING PROGRAM

## 2025-07-24 PROCEDURE — 85007 BL SMEAR W/DIFF WBC COUNT: CPT | Performed by: PHYSICIAN ASSISTANT

## 2025-07-24 PROCEDURE — 7100000001 HC RECOVERY ROOM TIME - INITIAL BASE CHARGE: Performed by: STUDENT IN AN ORGANIZED HEALTH CARE EDUCATION/TRAINING PROGRAM

## 2025-07-24 PROCEDURE — 81001 URINALYSIS AUTO W/SCOPE: CPT | Performed by: PHYSICIAN ASSISTANT

## 2025-07-24 PROCEDURE — 99285 EMERGENCY DEPT VISIT HI MDM: CPT | Performed by: PHYSICIAN ASSISTANT

## 2025-07-24 PROCEDURE — 3700000002 HC GENERAL ANESTHESIA TIME - EACH INCREMENTAL 1 MINUTE: Performed by: STUDENT IN AN ORGANIZED HEALTH CARE EDUCATION/TRAINING PROGRAM

## 2025-07-24 PROCEDURE — 2500000001 HC RX 250 WO HCPCS SELF ADMINISTERED DRUGS (ALT 637 FOR MEDICARE OP): Performed by: PHYSICIAN ASSISTANT

## 2025-07-24 RX ORDER — HYDROMORPHONE HYDROCHLORIDE 0.2 MG/ML
0.2 INJECTION INTRAMUSCULAR; INTRAVENOUS; SUBCUTANEOUS EVERY 5 MIN PRN
Status: DISCONTINUED | OUTPATIENT
Start: 2025-07-24 | End: 2025-07-25 | Stop reason: HOSPADM

## 2025-07-24 RX ORDER — DROPERIDOL 2.5 MG/ML
0.62 INJECTION, SOLUTION INTRAMUSCULAR; INTRAVENOUS ONCE AS NEEDED
Status: DISCONTINUED | OUTPATIENT
Start: 2025-07-24 | End: 2025-07-25 | Stop reason: HOSPADM

## 2025-07-24 RX ORDER — OXYCODONE HYDROCHLORIDE 5 MG/1
5 TABLET ORAL EVERY 4 HOURS PRN
Status: DISCONTINUED | OUTPATIENT
Start: 2025-07-24 | End: 2025-07-25 | Stop reason: HOSPADM

## 2025-07-24 RX ORDER — MIDAZOLAM HYDROCHLORIDE 2 MG/2ML
INJECTION, SOLUTION INTRAMUSCULAR; INTRAVENOUS AS NEEDED
Status: DISCONTINUED | OUTPATIENT
Start: 2025-07-24 | End: 2025-07-24

## 2025-07-24 RX ORDER — DOXYCYCLINE 100 MG/10ML
INJECTION, POWDER, LYOPHILIZED, FOR SOLUTION INTRAVENOUS AS NEEDED
Status: DISCONTINUED | OUTPATIENT
Start: 2025-07-24 | End: 2025-07-24

## 2025-07-24 RX ORDER — PROPOFOL 10 MG/ML
INJECTION, EMULSION INTRAVENOUS AS NEEDED
Status: DISCONTINUED | OUTPATIENT
Start: 2025-07-24 | End: 2025-07-24

## 2025-07-24 RX ORDER — OXYCODONE HYDROCHLORIDE 5 MG/1
5 TABLET ORAL ONCE
Refills: 0 | Status: COMPLETED | OUTPATIENT
Start: 2025-07-24 | End: 2025-07-24

## 2025-07-24 RX ORDER — ONDANSETRON HYDROCHLORIDE 2 MG/ML
INJECTION, SOLUTION INTRAVENOUS AS NEEDED
Status: DISCONTINUED | OUTPATIENT
Start: 2025-07-24 | End: 2025-07-24

## 2025-07-24 RX ORDER — OXYCODONE HYDROCHLORIDE 5 MG/1
10 TABLET ORAL EVERY 4 HOURS PRN
Status: DISCONTINUED | OUTPATIENT
Start: 2025-07-24 | End: 2025-07-25 | Stop reason: HOSPADM

## 2025-07-24 RX ORDER — ONDANSETRON HYDROCHLORIDE 2 MG/ML
4 INJECTION, SOLUTION INTRAVENOUS ONCE AS NEEDED
Status: DISCONTINUED | OUTPATIENT
Start: 2025-07-24 | End: 2025-07-25 | Stop reason: HOSPADM

## 2025-07-24 RX ORDER — ACETAMINOPHEN 325 MG/1
650 TABLET ORAL EVERY 6 HOURS
Qty: 56 TABLET | Refills: 0 | Status: SHIPPED | OUTPATIENT
Start: 2025-07-24 | End: 2025-07-31

## 2025-07-24 RX ORDER — IBUPROFEN 600 MG/1
600 TABLET, FILM COATED ORAL EVERY 6 HOURS PRN
Qty: 56 TABLET | Refills: 0 | Status: SHIPPED | OUTPATIENT
Start: 2025-07-24

## 2025-07-24 RX ORDER — SODIUM CHLORIDE 0.9 G/100ML
INJECTION, SOLUTION IRRIGATION AS NEEDED
Status: DISCONTINUED | OUTPATIENT
Start: 2025-07-24 | End: 2025-07-24 | Stop reason: HOSPADM

## 2025-07-24 RX ORDER — LIDOCAINE HYDROCHLORIDE 10 MG/ML
0.1 INJECTION, SOLUTION EPIDURAL; INFILTRATION; INTRACAUDAL; PERINEURAL ONCE
Status: DISCONTINUED | OUTPATIENT
Start: 2025-07-24 | End: 2025-07-25 | Stop reason: HOSPADM

## 2025-07-24 RX ORDER — FENTANYL CITRATE 50 UG/ML
INJECTION, SOLUTION INTRAMUSCULAR; INTRAVENOUS AS NEEDED
Status: DISCONTINUED | OUTPATIENT
Start: 2025-07-24 | End: 2025-07-24

## 2025-07-24 RX ORDER — LIDOCAINE HCL/PF 100 MG/5ML
SYRINGE (ML) INTRAVENOUS AS NEEDED
Status: DISCONTINUED | OUTPATIENT
Start: 2025-07-24 | End: 2025-07-24

## 2025-07-24 RX ORDER — SODIUM CHLORIDE, SODIUM LACTATE, POTASSIUM CHLORIDE, CALCIUM CHLORIDE 600; 310; 30; 20 MG/100ML; MG/100ML; MG/100ML; MG/100ML
INJECTION, SOLUTION INTRAVENOUS CONTINUOUS PRN
Status: DISCONTINUED | OUTPATIENT
Start: 2025-07-24 | End: 2025-07-24

## 2025-07-24 RX ORDER — ACETAMINOPHEN 325 MG/1
650 TABLET ORAL EVERY 4 HOURS PRN
Status: DISCONTINUED | OUTPATIENT
Start: 2025-07-24 | End: 2025-07-25 | Stop reason: HOSPADM

## 2025-07-24 RX ORDER — OXYCODONE AND ACETAMINOPHEN 5; 325 MG/1; MG/1
1 TABLET ORAL ONCE
Refills: 0 | Status: COMPLETED | OUTPATIENT
Start: 2025-07-24 | End: 2025-07-24

## 2025-07-24 RX ORDER — ALBUTEROL SULFATE 90 UG/1
INHALANT RESPIRATORY (INHALATION) AS NEEDED
Status: DISCONTINUED | OUTPATIENT
Start: 2025-07-24 | End: 2025-07-24

## 2025-07-24 RX ORDER — ACETAMINOPHEN 325 MG/1
650 TABLET ORAL ONCE
Status: COMPLETED | OUTPATIENT
Start: 2025-07-24 | End: 2025-07-24

## 2025-07-24 RX ADMIN — OXYCODONE HYDROCHLORIDE AND ACETAMINOPHEN 1 TABLET: 5; 325 TABLET ORAL at 08:52

## 2025-07-24 RX ADMIN — DOXYCYCLINE 200 MG: 100 INJECTION, POWDER, LYOPHILIZED, FOR SOLUTION INTRAVENOUS at 18:39

## 2025-07-24 RX ADMIN — ACETAMINOPHEN 650 MG: 325 TABLET ORAL at 11:47

## 2025-07-24 RX ADMIN — OXYCODONE HYDROCHLORIDE 10 MG: 5 TABLET ORAL at 21:24

## 2025-07-24 RX ADMIN — ONDANSETRON 4 MG: 2 INJECTION, SOLUTION INTRAMUSCULAR; INTRAVENOUS at 19:02

## 2025-07-24 RX ADMIN — DEXAMETHASONE SODIUM PHOSPHATE 8 MG: 4 INJECTION INTRA-ARTICULAR; INTRALESIONAL; INTRAMUSCULAR; INTRAVENOUS; SOFT TISSUE at 18:54

## 2025-07-24 RX ADMIN — MIDAZOLAM HYDROCHLORIDE 2 MG: 2 INJECTION, SOLUTION INTRAMUSCULAR; INTRAVENOUS at 18:37

## 2025-07-24 RX ADMIN — PROPOFOL 200 MG: 10 INJECTION, EMULSION INTRAVENOUS at 18:49

## 2025-07-24 RX ADMIN — Medication: at 19:22

## 2025-07-24 RX ADMIN — ALBUTEROL SULFATE 4 PUFF: 90 AEROSOL, METERED RESPIRATORY (INHALATION) at 18:52

## 2025-07-24 RX ADMIN — ALBUTEROL SULFATE 4 PUFF: 90 AEROSOL, METERED RESPIRATORY (INHALATION) at 18:50

## 2025-07-24 RX ADMIN — OXYCODONE 5 MG: 5 TABLET ORAL at 11:47

## 2025-07-24 RX ADMIN — PROPOFOL 150 MG: 10 INJECTION, EMULSION INTRAVENOUS at 18:40

## 2025-07-24 RX ADMIN — PROPOFOL 50 MG: 10 INJECTION, EMULSION INTRAVENOUS at 18:45

## 2025-07-24 RX ADMIN — LIDOCAINE HYDROCHLORIDE 100 MG: 20 INJECTION INTRAVENOUS at 18:39

## 2025-07-24 RX ADMIN — HYDROMORPHONE HYDROCHLORIDE 0.5 MG: 1 INJECTION, SOLUTION INTRAMUSCULAR; INTRAVENOUS; SUBCUTANEOUS at 19:54

## 2025-07-24 RX ADMIN — SODIUM CHLORIDE, POTASSIUM CHLORIDE, SODIUM LACTATE AND CALCIUM CHLORIDE: 600; 310; 30; 20 INJECTION, SOLUTION INTRAVENOUS at 18:37

## 2025-07-24 RX ADMIN — FENTANYL CITRATE 100 MCG: 50 INJECTION, SOLUTION INTRAMUSCULAR; INTRAVENOUS at 18:39

## 2025-07-24 ASSESSMENT — PAIN SCALES - GENERAL
PAINLEVEL_OUTOF10: 6
PAINLEVEL_OUTOF10: 3
PAINLEVEL_OUTOF10: 10 - WORST POSSIBLE PAIN
PAINLEVEL_OUTOF10: 10 - WORST POSSIBLE PAIN
PAINLEVEL_OUTOF10: 0 - NO PAIN
PAINLEVEL_OUTOF10: 6
PAINLEVEL_OUTOF10: 8
PAINLEVEL_OUTOF10: 8
PAINLEVEL_OUTOF10: 6
PAINLEVEL_OUTOF10: 0 - NO PAIN
PAINLEVEL_OUTOF10: 8
PAINLEVEL_OUTOF10: 8
PAINLEVEL_OUTOF10: 6

## 2025-07-24 ASSESSMENT — LIFESTYLE VARIABLES
HAVE PEOPLE ANNOYED YOU BY CRITICIZING YOUR DRINKING: NO
HAVE YOU EVER FELT YOU SHOULD CUT DOWN ON YOUR DRINKING: NO
EVER HAD A DRINK FIRST THING IN THE MORNING TO STEADY YOUR NERVES TO GET RID OF A HANGOVER: NO
TOTAL SCORE: 0
EVER FELT BAD OR GUILTY ABOUT YOUR DRINKING: NO

## 2025-07-24 ASSESSMENT — PAIN DESCRIPTION - LOCATION
LOCATION: ABDOMEN

## 2025-07-24 ASSESSMENT — ENCOUNTER SYMPTOMS
SHORTNESS OF BREATH: 0
FEVER: 0
DIZZINESS: 0
DYSURIA: 0
VOMITING: 0
ABDOMINAL PAIN: 1

## 2025-07-24 ASSESSMENT — PAIN - FUNCTIONAL ASSESSMENT
PAIN_FUNCTIONAL_ASSESSMENT: 0-10
PAIN_FUNCTIONAL_ASSESSMENT: UNABLE TO SELF-REPORT
PAIN_FUNCTIONAL_ASSESSMENT: 0-10
PAIN_FUNCTIONAL_ASSESSMENT: UNABLE TO SELF-REPORT
PAIN_FUNCTIONAL_ASSESSMENT: 0-10
PAIN_FUNCTIONAL_ASSESSMENT: UNABLE TO SELF-REPORT
PAIN_FUNCTIONAL_ASSESSMENT: 0-10

## 2025-07-24 ASSESSMENT — COLUMBIA-SUICIDE SEVERITY RATING SCALE - C-SSRS
2. HAVE YOU ACTUALLY HAD ANY THOUGHTS OF KILLING YOURSELF?: NO
1. IN THE PAST MONTH, HAVE YOU WISHED YOU WERE DEAD OR WISHED YOU COULD GO TO SLEEP AND NOT WAKE UP?: NO
6. HAVE YOU EVER DONE ANYTHING, STARTED TO DO ANYTHING, OR PREPARED TO DO ANYTHING TO END YOUR LIFE?: NO

## 2025-07-24 ASSESSMENT — PAIN DESCRIPTION - DESCRIPTORS
DESCRIPTORS: CRAMPING
DESCRIPTORS: OTHER (COMMENT)
DESCRIPTORS: CRAMPING

## 2025-07-24 ASSESSMENT — PAIN DESCRIPTION - PAIN TYPE: TYPE: ACUTE PAIN

## 2025-07-24 ASSESSMENT — PAIN DESCRIPTION - ORIENTATION: ORIENTATION: LEFT;LOWER
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
## 2025-07-24 NOTE — DISCHARGE INSTRUCTIONS
Cramps are the most common side effect after D&C. They usually rapidly subside after the procedure, but may last for a day or two. Light bleeding can persist for several days.     Call your provider's office if you experience cramps lasting longer than 48 hours, increasing pain, prolonged or heavy bleeding (such as saturating a sanitary pad within one hour more than once or passing golf ball sized clots), or a foul-smelling vaginal discharge.

## 2025-07-24 NOTE — ED TRIAGE NOTES
Pt presents to ED today after waking up and gush of blood leaked into the toilet. Patient states she had a tampon in and the blood came out even with a tampon in.She states this is not her cycle, and that she had an  5 weeks ago. Also reports abd cramping

## 2025-07-24 NOTE — ANESTHESIA POSTPROCEDURE EVALUATION
Patient: Alba Mendez    Procedure Summary       Date: 07/24/25 Room / Location: [facility] OR 02 / Virtual [facility] OR    Anesthesia Start: 1837 Anesthesia Stop: 1927    Procedure: DILATION AND CURETTAGE, UTERUS, USING SUCTION Diagnosis:       Retained products of conception, early pregnancy ([facility])      (Retained products of conception, early pregnancy ([facility]) [O02.1])    Surgeons: Monie Domingo MD Responsible Provider: Levon Mcconnell MD    Anesthesia Type: general ASA Status: 1            Anesthesia Type: general    Vitals Value Taken Time   /70 07/24/25 19:29   Temp 36.2 07/24/25 19:29   Pulse 91 07/24/25 19:29   Resp 12 07/24/25 19:29   SpO2 100% 07/24/25 19:29       Anesthesia Post Evaluation    Patient location during evaluation: PACU  Patient participation: waiting for patient participation  Level of consciousness: sleepy but conscious  Pain management: adequate  Airway patency: patent  Two or more strategies used to mitigate risk of obstructive sleep apnea  Cardiovascular status: acceptable and blood pressure returned to baseline  Respiratory status: acceptable  Hydration status: acceptable  Postoperative Nausea and Vomiting: none        No notable events documented.

## 2025-07-24 NOTE — ANESTHESIA PROCEDURE NOTES
Airway  Date/Time: 7/24/2025 6:42 PM  Reason: elective    Airway not difficult    Staffing  Performed: resident   Authorized by: Levon Mcconnell MD    Performed by: Kathy Rojas MD  Patient location during procedure: OR    Patient Condition  Indications for airway management: anesthesia  Patient position: sniffing  Sedation level: deep     Final Airway Details   Preoxygenated: yes  Final airway type: supraglottic airway  Successful airway: Supraglottic airway: iGel3.  Size: 3   Ventilation between attempts: BVM  Number of attempts at approach: 2    Additional Comments  iGel initially placed easily without incident. After moving patient on OR table, drop in tidal volumes and increased airway pressures w/ suspicion for laryngospasm vs bronchospasm. Pt given additional anesthetic and bag mask ventilated and LMA replaced after resolution of spasm.

## 2025-07-24 NOTE — ANESTHESIA PREPROCEDURE EVALUATION
Patient: Alba Mendez    Procedure Information       Date/Time: 07/24/25 1740    Procedure: DILATION AND CURETTAGE, UTERUS, USING SUCTION    Location: [facility] OR 02 / [facility] OR    Surgeons: Monie Domingo MD            Relevant Problems   Neuro   (+) Depression   (+) JANY (generalized anxiety disorder)   (+) Major depressive disorder in remission   (+) PTSD (post-traumatic stress disorder)      GI   (+) Irritable bowel syndrome       Clinical information reviewed:   Tobacco  Allergies  Meds   Med Hx  Surg Hx  OB Status  Fam Hx  Soc   Hx        NPO Detail:  NPO/Void Status  Carbohydrate Drink Given Prior to Surgery? : N  Date of Last Liquid: 07/23/25  Time of Last Liquid: 2317  Date of Last Solid: 07/23/25  Time of Last Solid: 2300  Last Intake Type: Clear fluids  Time of Last Void: 1718         Physical Exam    Airway  Mallampati: II  TM distance: >3 FB  Neck ROM: full     Cardiovascular   Rhythm: regular  Rate: normal     Dental    Pulmonary Breath sounds clear to auscultation     Abdominal            Anesthesia Plan    History of general anesthesia?: yes  History of complications of general anesthesia?: no    ASA 1     general     intravenous induction   Postoperative pain plan includes opioids.  Trial extubation is planned.  Anesthetic plan and risks discussed with patient.  Use of blood products discussed with patient who.

## 2025-07-24 NOTE — ED PROVIDER NOTES
Emergency Department Provider Note        History of Present Illness     History provided by: Patient  Limitations to History: None  External Records Reviewed with Brief Summary: pmhx    HPI:  Alba Mendez is a 35 y.o. female who is G6, P3 who had a recent  at 7/8 weeks who presents today for evaluation of vaginal bleeding.  Patient states her  was about 4 to 5 weeks ago, states she had a D&C, states she did not receive any medications because she has IBS.  She states that she had some bleeding after the procedure, the bleeding had completely subsided and then 8 days ago she started having a menstrual cycle that was normal for her.  Patient states that yesterday she was just spotting slightly, was towards the end of her cycle and when she woke up today she states she felt a warm gush, states she went to the bathroom and the entire bathroom is covered in blood, she states that she filled up approximately 4 measuring cups of blood (estimated) and is continuing to bleed, states she became concerned and called EMS.  Patient states at the same time she started whelping right lower quadrant abdominal pain.  Patient denies any vaginal discharge or concern for STDs, denies urinary frequency burning urgency, dizziness, lightheadedness.  Patient had her  at .  She states she did have an ultrasound confirming IUP at that time.    Physical Exam   Triage vitals:  T 36.2 °C (97.2 °F)  HR (!) 103  BP (!) 141/100  RR 16  O2 100 % None (Room air)    Physical Exam  Vitals and nursing note reviewed.   Constitutional:       General: She is not in acute distress.     Appearance: Normal appearance. She is not toxic-appearing.   HENT:      Head: Normocephalic and atraumatic.      Nose: Nose normal.     Eyes:      Extraocular Movements: Extraocular movements intact.       Cardiovascular:      Rate and Rhythm: Normal rate and regular rhythm.   Pulmonary:      Effort: Pulmonary effort is normal.    Abdominal:      Palpations: Abdomen is soft.      Comments: Diffuse tenderness with guarding across the lower abdomen especially in the right adnexa.   Genitourinary:     Comments: Chaperoned by Gia Whitehead, patient had a large amount of bright red blood in the vaginal vault including large clots, suction was used to suction out the blood, approximately 25 cc in the suction canister.  I observed patient cervix for a few minutes, there was no active hemorrhage, no heavy bleeding after initial amount of blood was cleared out.  On bimanual exam patient had no cervical motion tenderness, no masses or fullness but did have right adnexal tenderness.    Musculoskeletal:         General: Normal range of motion.      Cervical back: Normal range of motion and neck supple.     Skin:     General: Skin is warm and dry.     Neurological:      General: No focal deficit present.      Mental Status: She is alert.     Psychiatric:         Mood and Affect: Mood normal.         Thought Content: Thought content normal.       US PELVIS TRANSABDOMINAL WITH TRANSVAGINAL   Final Result   Heterogeneously thickened endometrium with internal vascularity   measuring approximately 3.4 cm along the fundus, concerning for   retained products of conception in the current clinical setting with   likely blood products.        I have reviewed the images/study and I agree with the findings as   stated by Ricardo Persaud MD (PGY-3).        MACRO:   Critical Finding:  See findings. Notification was initiated on   7/24/2025 at 12:11 pm by  Ricardo Persaud to .  (**-OCF-**)   Instructions:  See Findings.        Signed by: Sejal Reeves 7/24/2025 12:26 PM   Dictation workstation:   [id number]        Labs Reviewed   CBC WITH AUTO DIFFERENTIAL - Abnormal       Result Value    WBC 4.3 (*)     nRBC 0.0      RBC 3.88 (*)     Hemoglobin 12.1      Hematocrit 34.1 (*)     MCV 88      MCH 31.2      MCHC 35.5      RDW 13.1      Platelets 252      Immature  Granulocytes %, Automated 0.2      Immature Granulocytes Absolute, Automated 0.01      Narrative:     The previously reported component Neutrophils % is no longer being reported.  The previously reported component Lymphocytes % is no longer being reported.  The previously reported component Monocytes % is no longer being reported.  The previously   reported component Eosinophils % is no longer being reported.  The previously reported component Basophils % is no longer being reported.  The previously reported component Absolute Neutrophils is no longer being reported.  The previously reported   component Absolute Lymphocytes is no longer being reported.  The previously reported component Absolute Monocytes is no longer being reported.  The previously reported component Absolute Eosinophils is no longer being reported.  The previously reported   component Absolute Basophils is no longer being reported.   COMPREHENSIVE METABOLIC PANEL - Abnormal    Glucose 123 (*)     Sodium 139      Potassium 3.7      Chloride 107      Bicarbonate 23      Anion Gap 13      Urea Nitrogen 9      Creatinine 0.59      eGFR >90      Calcium 9.4      Albumin 4.8      Alkaline Phosphatase 52      Total Protein 7.5      AST 9      Bilirubin, Total 0.5      ALT 8     HUMAN CHORIONIC GONADOTROPIN, SERUM QUANTITATIVE - Abnormal    HCG, Beta-Quantitative 7 (*)     Narrative:     Total HCG measurement is performed using the Siemens [employer] immunoassay which detects intact HCG and free beta HCG subunit.  This test is not indicated for use as a tumor marker.  HCG testing is performed using a different test methodology at [facility] than other [facility]. Direct result comparison  should only be made within the same method.          URINALYSIS WITH REFLEX CULTURE AND MICROSCOPIC - Abnormal    Color, Urine Red (*)     Appearance, Urine Ex.Turbid (*)     Specific Gravity, Urine 1.018      pH, Urine 7.0      Protein, Urine 300 (3+)  (*)     Glucose, Urine 50 (TRACE) (*)     Blood, Urine OVER (3+) (*)     Ketones, Urine NEGATIVE      Bilirubin, Urine NEGATIVE      Urobilinogen, Urine Normal      Nitrite, Urine NEGATIVE      Leukocyte Esterase, Urine NEGATIVE      Narrative:     OVER is reported when the result is greater than the clinically reportable range.   URINALYSIS MICROSCOPIC WITH REFLEX CULTURE - Abnormal    WBC, Urine NONE      RBC, Urine >20 (*)    COAGULATION SCREEN - Normal    Protime 10.5      INR 1.0      aPTT 32      Narrative:     The APTT is no longer used for monitoring Unfractionated Heparin Therapy. For monitoring Heparin Therapy, use the Heparin Assay.   POCT PREGNANCY, URINE - Normal    Preg Test, Ur Negative     TYPE AND SCREEN    ABO TYPE A      Rh TYPE POS      ANTIBODY SCREEN NEG     URINALYSIS WITH REFLEX CULTURE AND MICROSCOPIC    Narrative:     The following orders were created for panel order Urinalysis with Reflex Culture and Microscopic.  Procedure                               Abnormality         Status                     ---------                               -----------         ------                     Urinalysis with Reflex C...[[id number]]  Abnormal            Final result               Extra Urine Gray Tube[[id number]]                            Final result                 Please view results for these tests on the individual orders.   EXTRA URINE GRAY TUBE    Extra Tube       MANUAL DIFFERENTIAL    Neutrophils %, Manual 49.1      Bands %, Manual 0.0      Lymphocytes %, Manual 44.0      Monocytes %, Manual 6.9      Eosinophils %, Manual 0.0      Basophils %, Manual 0.0      Atypical Lymphocytes %, Manual 0.0      Metamyelocytes %, Manual 0.0      Myelocytes %, Manual 0.0      Plasma Cells %, Manual 0.0      Promyelocytes %, Manual 0.0      Blasts %, Manual 0.0      Others %, Manual 0.0      Seg Neutrophils Absolute, Manual 2.11      Bands Absolute, Manual 0.00      Lymphocytes Absolute, Manual 1.89       Monocytes Absolute, Manual 0.30      Eosinophils Absolute, Manual 0.00      Basophils Absolute, Manual 0.00      Atypical Lymphs Absolute, Manual 0.00      Metamyelocytes Absolute, Manual 0.00      Myelocytes Absolute, Manual 0.00      Plasma Cells Absolute, Manual 0.00      Promyelocytes Absolute, Manual 0.00      Blasts Absolute, Manual 0.00      Others Absolute, Manual 0.00      Total Cells Counted 116      Neutrophils Absolute, Manual 2.11      Manual nRBC per 100 Cells 0.0      RBC Morphology See Below      Polychromasia Mild      Target Cells Few       ED Course as of 25 175   Thu 2025   09 HEMOGLOBIN: 12.1  Normal hemoglobin [MC]   1534 ATTENDING ATTESTATION  35-year-old female status postelective D&E found to have retained products vaginal bleeding and pain seen and evaluated by OB, disposition to the OR for evacuation.  Patient stable  [facility] [RH]      ED Course User Index  [MC] Vikki Leavitt PA-C  [RH] Marquis Garrido, DO         Diagnoses as of 25   Retained products of conception, early pregnancy ([facility])          Medical Decision Making & ED Course   Medical Decision Makin y.o. female presents today for evaluation of heavy vaginal bleeding, see above HPI and physical exam, given that she is having localized adnexal pain, will order an ultrasound in addition to labs, she was ordered Percocet for 7 out of 10 pain.      Patient's hCG was very slightly elevated at 7, her urine showed blood, however this is contaminated with vaginal bleeding, ultrasound showed possible retained products of conception, hemoglobin was normal, I did consult OB/GYN as soon as I was messaged by radiology, they came and saw the patient, consented patient for a D&C, case was discussed with the ED attending, Dr. Garrido and patient will be taken to the OR with OB/GYN team, will be discharged from PACU.  ----      Differential diagnoses considered include but are not limited to: Differential diagnosis  includes retained products of conception, fibroids, hormonal bleeding, etc.     Social Determinants of Health which Significantly Impact Care: None identified     EKG Independent Interpretation: EKG not obtained    Independent Result Review and Interpretation: Relevant laboratory and radiographic results were reviewed and independently interpreted by myself.  As necessary, they are commented on in the ED Course.    Chronic conditions affecting the patient's care: As documented above in MDM    The patient was discussed with the following consultants/services: OBGYN    Care Considerations: As documented above in MDM    ED Course:  ED Course as of 07/24/25 1752   Thu Jul 24, 2025   0917 HEMOGLOBIN: 12.1  Normal hemoglobin []   1534 ATTENDING ATTESTATION  35-year-old female status postelective D&E found to have retained products vaginal bleeding and pain seen and evaluated by OB, disposition to the OR for evacuation.  Patient stable  RM [RH]      ED Course User Index  [MC] Vikki Leavitt PA-C  [RH] Marquis Garrido DO         Diagnoses as of 07/24/25 1752   Retained products of conception, early pregnancy ([facility])     Disposition   Send to OR    Procedures   Procedures    This was a shared visit with an ED attending.  The patient was seen and discussed with the ED attending    Vikki Leavitt PA-C  Emergency Medicine       Vikki Leavitt PA-C  07/24/25 1752

## 2025-07-25 NOTE — OP NOTE
Date: 2025  OR Location: [facility] OR    Name: Alba Mendez : 1990, Age: 35 y.o., MRN: [id number], Sex: female    Diagnosis  Pre-op Diagnosis      * Retained products of conception, early pregnancy ([facility]) [O02.1] Post-op Diagnosis     * Retained products of conception, early pregnancy ([facility]) [O02.1]     Procedures  DILATION AND CURETTAGE, UTERUS, USING SUCTION  79580 - MT DILATION & CURETTAGE DX&/THER NONOBSTETRIC      Surgeons      * Wen Jones MD - Primary    Resident/Fellow/Other Assistant:  Surgeons and Role:     * Casie Gilmore MD - Resident - Assisting    Staff:   Circulator: Coco  Scrub Person: Cierra Beltrán Circulator: Isabella    Anesthesia Staff: Anesthesiologist: Levon Mcconnell MD  Anesthesia Resident: Kathy Rojas MD; Delphine Sy DO    Procedure Summary  Anesthesia: General  ASA: I  Estimated Blood Loss: 50mL  Intra-op Medications:   Administrations occurring from  to  on 25:   Medication Name Total Dose   surgical lubricant gel 1 Application   sodium chloride 0.9 % irrigation solution 1,000 mL   albuterol inhaler 8 puff   dexAMETHasone (Decadron) 4 mg/mL IV Syringe 2 mL 8 mg   doxycycline (Vibramycin) 100 mg 200 mg   fentaNYL (Sublimaze) injection 50 mcg/mL 100 mcg   LR infusion Cannot be calculated   lidocaine (cardiac) injection 2% prefilled syringe 100 mg   midazolam PF (Versed) injection 1 mg/mL 2 mg   ondansetron (Zofran) 2 mg/mL injection 4 mg   propofol (Diprivan) injection 10 mg/mL 400 mg              Anesthesia Record               Intraprocedure I/O Totals          Intake    LR infusion 250.00 mL    Total Intake 250 mL          Specimen:   ID Type Source Tests Collected by Time   1 : Products of specimen Tissue PRODUCTS OF CONCEPTION SURGICAL PATHOLOGY EXAM Monie Domingo MD 2025 1923            Procedure Details:  The patient was seen in the preoperative area. The site of surgery was properly noted/marked if necessary per policy.  The patient has been actively warmed in preoperative area. Preoperative antibiotics have been ordered and given within 1 hours of incision. Venous thrombosis prophylaxis have been ordered including bilateral sequential compression devices    Findings: Slightly retroverted uterus with small amount of blood products and small fibroid seen on ultrasound. Thin endometrial stripe seen at end of procedure.    Procedure:  Pt was taken to the OR where general anesthesia was administered. She was then placed in the dorsal lithotomy position. She was then prepped and draped in the usual sterile fashion. A speculum was used to visualize the cervix. A single tooth tenaculum was used to grasp the anterior lip of the cervix. Bellamy dilators were used to dilate up to a 24 Stateless. 8mm suction was used to obtain endometrial curettings. Multiple passes of suction were required. After a thin stripe was seen on bedside ultrasound, tenaculum was removed and site was hemostatic with minimal pressure. Speculum was removed from the vagina. All counts were correct, the patient tolerated the procedure well. Dr. Cordon was present for the entire procedure. The patient was taken to PACU in stable condition.      Complications:  None; patient tolerated the procedure well.     Disposition: PACU - hemodynamically stable.  Condition: stable

## 2025-07-28 LAB
LABORATORY COMMENT REPORT: NORMAL
PATH REPORT.FINAL DX SPEC: NORMAL
PATH REPORT.GROSS SPEC: NORMAL
PATH REPORT.RELEVANT HX SPEC: NORMAL
PATH REPORT.TOTAL CANCER: NORMAL

## 2025-08-08 ENCOUNTER — APPOINTMENT (OUTPATIENT)
Dept: OBSTETRICS AND GYNECOLOGY | Facility: CLINIC | Age: 35
End: 2025-08-08
Payer: COMMERCIAL

## 2025-09-03 ENCOUNTER — APPOINTMENT (OUTPATIENT)
Dept: BEHAVIORAL HEALTH | Facility: CLINIC | Age: 35
End: 2025-09-03
Payer: COMMERCIAL

## 2025-09-26 ENCOUNTER — APPOINTMENT (OUTPATIENT)
Dept: OBSTETRICS AND GYNECOLOGY | Facility: CLINIC | Age: 35
End: 2025-09-26
Payer: COMMERCIAL

## 2025-10-02 ENCOUNTER — APPOINTMENT (OUTPATIENT)
Facility: CLINIC | Age: 35
End: 2025-10-02
Payer: COMMERCIAL

## (undated) DEVICE — CATHETER, URETHRAL, ROBNEL, 16 FR, 16 IN, LF, RED

## (undated) DEVICE — PREP TRAY, SKIN, DRY, W/GLOVES

## (undated) DEVICE — Device

## (undated) DEVICE — REST, HEAD, BAGEL, 9 IN

## (undated) DEVICE — GOWN, SURGICAL, SMARTGOWN, XLARGE, STERILE

## (undated) DEVICE — COVER, TABLE, 44 X 75 IN, DISPOSABLE, LF, STERILE

## (undated) DEVICE — TOWEL, SURGICAL, NEURO, O/R, 16 X 26, BLUE, STERILE

## (undated) DEVICE — GOWN, ASTOUND, L

## (undated) DEVICE — SPONGE, GAUZE, XRAY DECT, 16 PLY, 4 X 4, W/MASTER DMT,STERILE

## (undated) DEVICE — COVER, LIGHT HANDLE, SURGICAL, FLEXIBLE, DISPOSABLE, STERILE

## (undated) DEVICE — TOWEL PACK, STERILE, 4/PACK, BLUE

## (undated) DEVICE — MARKER, SKIN, RULER AND LABEL PACK, CUSTOM

## (undated) DEVICE — MANIFOLD, 4 PORT NEPTUNE STANDARD

## (undated) DEVICE — COUNTER, NEEDLE, FOAM BLOCK, W/MAGNET, W/BLADE GUARD, 10 COUNT, RED, LF

## (undated) DEVICE — TUBING, SUCTION, VACUUM, INTRAUTERINE, CURETTAGE, HANDLE, MALE ADAPTER, 6 FT X 0.375 IN

## (undated) DEVICE — DRESSING, NON-ADHERENT, TELFA, OUCHLESS, 3 X 8 IN, STERILE

## (undated) DEVICE — DRAPE, PAD, PREP, W/ 9 IN CUFF, 24 X 41, LF, NS

## (undated) DEVICE — COVER, CART, 45 X 27 X 48 IN, CLEAR

## (undated) DEVICE — DRAPE PACK, CESAREAN SECTION, CUSTOM, UHC